# Patient Record
Sex: MALE | Race: WHITE | Employment: OTHER
[De-identification: names, ages, dates, MRNs, and addresses within clinical notes are randomized per-mention and may not be internally consistent; named-entity substitution may affect disease eponyms.]

---

## 2017-01-06 ENCOUNTER — OFFICE VISIT (OUTPATIENT)
Dept: FAMILY MEDICINE CLINIC | Facility: CLINIC | Age: 32
End: 2017-01-06

## 2017-01-06 VITALS
SYSTOLIC BLOOD PRESSURE: 112 MMHG | BODY MASS INDEX: 26.82 KG/M2 | HEIGHT: 74 IN | HEART RATE: 74 BPM | WEIGHT: 209 LBS | DIASTOLIC BLOOD PRESSURE: 78 MMHG | RESPIRATION RATE: 20 BRPM | TEMPERATURE: 97 F

## 2017-01-06 DIAGNOSIS — M79.10 MYALGIA: ICD-10-CM

## 2017-01-06 DIAGNOSIS — R68.89 FLU-LIKE SYMPTOMS: Primary | ICD-10-CM

## 2017-01-06 PROCEDURE — 99214 OFFICE O/P EST MOD 30 MIN: CPT | Performed by: NURSE PRACTITIONER

## 2017-01-07 ASSESSMENT — ENCOUNTER SYMPTOMS
BACK PAIN: 1
COUGH: 0
NAUSEA: 0
ABDOMINAL PAIN: 0
SHORTNESS OF BREATH: 0

## 2017-01-23 RX ORDER — TOPIRAMATE 25 MG/1
25 TABLET ORAL 2 TIMES DAILY
Qty: 60 TABLET | Refills: 3 | Status: SHIPPED | OUTPATIENT
Start: 2017-01-23 | End: 2017-06-08 | Stop reason: SDUPTHER

## 2017-04-26 ENCOUNTER — TELEPHONE (OUTPATIENT)
Dept: NEUROLOGY | Age: 32
End: 2017-04-26

## 2017-04-26 DIAGNOSIS — G93.9 HEADACHE DUE TO INTRACRANIAL DISEASE: ICD-10-CM

## 2017-04-26 DIAGNOSIS — R51.9 HEADACHE DUE TO INTRACRANIAL DISEASE: ICD-10-CM

## 2017-04-26 DIAGNOSIS — G91.9 HYDROCEPHALUS WITH OPERATING SHUNT (HCC): Primary | ICD-10-CM

## 2017-04-27 RX ORDER — HYDROCODONE BITARTRATE AND ACETAMINOPHEN 7.5; 325 MG/1; MG/1
1 TABLET ORAL EVERY 6 HOURS PRN
Qty: 30 TABLET | Refills: 0 | Status: SHIPPED | OUTPATIENT
Start: 2017-04-27 | End: 2017-05-04

## 2017-05-12 ENCOUNTER — HOSPITAL ENCOUNTER (OUTPATIENT)
Dept: MRI IMAGING | Age: 32
Discharge: HOME | End: 2017-05-12
Payer: MEDICARE

## 2017-05-12 DIAGNOSIS — G93.9 HEADACHE DUE TO INTRACRANIAL DISEASE: ICD-10-CM

## 2017-05-12 DIAGNOSIS — G91.9 HYDROCEPHALUS WITH OPERATING SHUNT (HCC): ICD-10-CM

## 2017-05-12 DIAGNOSIS — R51.9 HEADACHE DUE TO INTRACRANIAL DISEASE: ICD-10-CM

## 2017-05-12 PROCEDURE — 70551 MRI BRAIN STEM W/O DYE: CPT

## 2017-05-17 ENCOUNTER — TELEPHONE (OUTPATIENT)
Dept: NEUROLOGY | Age: 32
End: 2017-05-17

## 2017-05-18 RX ORDER — SUMATRIPTAN 100 MG/1
TABLET, FILM COATED ORAL
Qty: 9 TABLET | Refills: 1 | Status: SHIPPED | OUTPATIENT
Start: 2017-05-18 | End: 2017-07-31

## 2017-06-12 RX ORDER — TOPIRAMATE 25 MG/1
25 TABLET ORAL 2 TIMES DAILY
Qty: 60 TABLET | Refills: 2 | Status: SHIPPED | OUTPATIENT
Start: 2017-06-12 | End: 2017-08-28 | Stop reason: SDUPTHER

## 2017-07-31 ENCOUNTER — OFFICE VISIT (OUTPATIENT)
Dept: FAMILY MEDICINE CLINIC | Age: 32
End: 2017-07-31
Payer: MEDICARE

## 2017-07-31 VITALS
HEART RATE: 68 BPM | BODY MASS INDEX: 26.44 KG/M2 | OXYGEN SATURATION: 97 % | SYSTOLIC BLOOD PRESSURE: 113 MMHG | WEIGHT: 206 LBS | DIASTOLIC BLOOD PRESSURE: 76 MMHG | TEMPERATURE: 98.4 F | HEIGHT: 74 IN

## 2017-07-31 DIAGNOSIS — B07.0 PLANTAR WART OF LEFT FOOT: Primary | ICD-10-CM

## 2017-07-31 DIAGNOSIS — R73.9 HYPERGLYCEMIA: ICD-10-CM

## 2017-07-31 LAB — HBA1C MFR BLD: 5.1 %

## 2017-07-31 PROCEDURE — 99213 OFFICE O/P EST LOW 20 MIN: CPT | Performed by: FAMILY MEDICINE

## 2017-07-31 PROCEDURE — 83036 HEMOGLOBIN GLYCOSYLATED A1C: CPT | Performed by: FAMILY MEDICINE

## 2017-08-28 ENCOUNTER — OFFICE VISIT (OUTPATIENT)
Dept: NEUROLOGY | Age: 32
End: 2017-08-28
Payer: MEDICARE

## 2017-08-28 VITALS
WEIGHT: 208 LBS | DIASTOLIC BLOOD PRESSURE: 78 MMHG | BODY MASS INDEX: 26.69 KG/M2 | HEART RATE: 73 BPM | HEIGHT: 74 IN | SYSTOLIC BLOOD PRESSURE: 118 MMHG

## 2017-08-28 DIAGNOSIS — G91.9 HYDROCEPHALUS WITH OPERATING SHUNT (HCC): ICD-10-CM

## 2017-08-28 DIAGNOSIS — G93.9 HEADACHE DUE TO INTRACRANIAL DISEASE: Primary | ICD-10-CM

## 2017-08-28 DIAGNOSIS — R51.9 HEADACHE DUE TO INTRACRANIAL DISEASE: Primary | ICD-10-CM

## 2017-08-28 PROCEDURE — 99214 OFFICE O/P EST MOD 30 MIN: CPT | Performed by: PSYCHIATRY & NEUROLOGY

## 2017-08-28 RX ORDER — TOPIRAMATE 50 MG/1
50 TABLET, FILM COATED ORAL 2 TIMES DAILY
Qty: 180 TABLET | Refills: 3 | Status: SHIPPED | OUTPATIENT
Start: 2017-08-28 | End: 2018-07-09 | Stop reason: DRUGHIGH

## 2017-08-28 RX ORDER — TOPIRAMATE 25 MG/1
25 TABLET ORAL 2 TIMES DAILY
Qty: 180 TABLET | Refills: 3 | Status: SHIPPED | OUTPATIENT
Start: 2017-08-28 | End: 2018-07-09 | Stop reason: DRUGHIGH

## 2017-09-11 ENCOUNTER — HOSPITAL ENCOUNTER (OUTPATIENT)
Age: 32
Discharge: HOME | End: 2017-09-11
Payer: MEDICARE

## 2017-09-11 DIAGNOSIS — G93.9 HEADACHE DUE TO INTRACRANIAL DISEASE: ICD-10-CM

## 2017-09-11 DIAGNOSIS — R51.9 HEADACHE DUE TO INTRACRANIAL DISEASE: ICD-10-CM

## 2017-09-11 LAB
ALT SERPL-CCNC: 15 U/L (ref 5–41)
ANION GAP SERPL CALCULATED.3IONS-SCNC: 13 MMOL/L (ref 9–17)
BUN BLDV-MCNC: 16 MG/DL (ref 6–20)
CHLORIDE BLD-SCNC: 105 MMOL/L (ref 98–107)
CO2: 24 MMOL/L (ref 20–31)
CREAT SERPL-MCNC: 0.86 MG/DL (ref 0.7–1.2)
GFR AFRICAN AMERICAN: >60 ML/MIN
GFR NON-AFRICAN AMERICAN: >60 ML/MIN
GFR SERPL CREATININE-BSD FRML MDRD: NORMAL ML/MIN/{1.73_M2}
GFR SERPL CREATININE-BSD FRML MDRD: NORMAL ML/MIN/{1.73_M2}
HCT VFR BLD CALC: 42 % (ref 41–53)
HEMOGLOBIN: 14.1 G/DL (ref 13.5–17.5)
MCH RBC QN AUTO: 31.7 PG (ref 26–34)
MCHC RBC AUTO-ENTMCNC: 33.7 G/DL (ref 31–37)
MCV RBC AUTO: 94.1 FL (ref 80–100)
PDW BLD-RTO: 13 % (ref 11.5–14.9)
PLATELET # BLD: 207 K/UL (ref 150–450)
PMV BLD AUTO: 9.1 FL (ref 6–12)
POTASSIUM SERPL-SCNC: 3.9 MMOL/L (ref 3.7–5.3)
RBC # BLD: 4.46 M/UL (ref 4.5–5.9)
SODIUM BLD-SCNC: 142 MMOL/L (ref 135–144)
WBC # BLD: 7.1 K/UL (ref 3.5–11)

## 2017-09-11 PROCEDURE — 80051 ELECTROLYTE PANEL: CPT

## 2017-09-11 PROCEDURE — 84460 ALANINE AMINO (ALT) (SGPT): CPT

## 2017-09-11 PROCEDURE — 36415 COLL VENOUS BLD VENIPUNCTURE: CPT

## 2017-09-11 PROCEDURE — 85027 COMPLETE CBC AUTOMATED: CPT

## 2017-09-11 PROCEDURE — 84520 ASSAY OF UREA NITROGEN: CPT

## 2017-09-11 PROCEDURE — 82565 ASSAY OF CREATININE: CPT

## 2017-10-30 ENCOUNTER — HOSPITAL ENCOUNTER (OUTPATIENT)
Age: 32
Discharge: HOME | End: 2017-10-30
Payer: MEDICARE

## 2017-10-30 ENCOUNTER — OFFICE VISIT (OUTPATIENT)
Dept: FAMILY MEDICINE CLINIC | Age: 32
End: 2017-10-30
Payer: MEDICARE

## 2017-10-30 VITALS
WEIGHT: 211 LBS | SYSTOLIC BLOOD PRESSURE: 110 MMHG | HEIGHT: 74 IN | DIASTOLIC BLOOD PRESSURE: 70 MMHG | OXYGEN SATURATION: 98 % | BODY MASS INDEX: 27.08 KG/M2 | HEART RATE: 90 BPM

## 2017-10-30 DIAGNOSIS — D64.9 ANEMIA, UNSPECIFIED TYPE: ICD-10-CM

## 2017-10-30 DIAGNOSIS — R74.8 BLOOD ALKALINE PHOSPHATASE INCREASED COMPARED WITH PRIOR MEASUREMENT: ICD-10-CM

## 2017-10-30 DIAGNOSIS — G91.9 HYDROCEPHALUS WITH OPERATING SHUNT (HCC): ICD-10-CM

## 2017-10-30 DIAGNOSIS — R53.82 CHRONIC FATIGUE: ICD-10-CM

## 2017-10-30 DIAGNOSIS — R09.81 NASAL CONGESTION: ICD-10-CM

## 2017-10-30 DIAGNOSIS — Z23 NEED FOR IMMUNIZATION AGAINST INFLUENZA: ICD-10-CM

## 2017-10-30 DIAGNOSIS — M20.40 HAMMER TOE, ACQUIRED: ICD-10-CM

## 2017-10-30 DIAGNOSIS — R63.5 UNINTENDED WEIGHT GAIN: ICD-10-CM

## 2017-10-30 DIAGNOSIS — B07.0 PLANTAR WART OF LEFT FOOT: ICD-10-CM

## 2017-10-30 DIAGNOSIS — R53.82 CHRONIC FATIGUE: Primary | ICD-10-CM

## 2017-10-30 DIAGNOSIS — R04.0 EPISTAXIS, RECURRENT: ICD-10-CM

## 2017-10-30 LAB
ALBUMIN SERPL-MCNC: 4.8 G/DL (ref 3.5–5.2)
ALBUMIN/GLOBULIN RATIO: ABNORMAL (ref 1–2.5)
ALP BLD-CCNC: 104 U/L (ref 40–129)
ALT SERPL-CCNC: 20 U/L (ref 5–41)
ANION GAP SERPL CALCULATED.3IONS-SCNC: 10 MMOL/L (ref 9–17)
AST SERPL-CCNC: 19 U/L
BILIRUB SERPL-MCNC: <0.15 MG/DL (ref 0.3–1.2)
BUN BLDV-MCNC: 13 MG/DL (ref 6–20)
BUN/CREAT BLD: ABNORMAL (ref 9–20)
CALCIUM SERPL-MCNC: 9.4 MG/DL (ref 8.6–10.4)
CHLORIDE BLD-SCNC: 108 MMOL/L (ref 98–107)
CO2: 24 MMOL/L (ref 20–31)
CREAT SERPL-MCNC: 0.86 MG/DL (ref 0.7–1.2)
FERRITIN: 111 UG/L (ref 30–400)
FOLATE: 15.7 NG/ML
GFR AFRICAN AMERICAN: >60 ML/MIN
GFR NON-AFRICAN AMERICAN: >60 ML/MIN
GFR SERPL CREATININE-BSD FRML MDRD: ABNORMAL ML/MIN/{1.73_M2}
GFR SERPL CREATININE-BSD FRML MDRD: ABNORMAL ML/MIN/{1.73_M2}
GLUCOSE BLD-MCNC: 110 MG/DL (ref 70–99)
HAV IGM SER IA-ACNC: NONREACTIVE
HCT VFR BLD CALC: 45.4 % (ref 41–53)
HEMOGLOBIN: 15.3 G/DL (ref 13.5–17.5)
HEPATITIS B CORE IGM ANTIBODY: NONREACTIVE
HEPATITIS B SURFACE ANTIGEN: NONREACTIVE
HEPATITIS C ANTIBODY: NONREACTIVE
IRON SATURATION: 26 % (ref 20–55)
IRON: 73 UG/DL (ref 59–158)
MCH RBC QN AUTO: 31.9 PG (ref 26–34)
MCHC RBC AUTO-ENTMCNC: 33.8 G/DL (ref 31–37)
MCV RBC AUTO: 94.3 FL (ref 80–100)
PDW BLD-RTO: 12.8 % (ref 11.5–14.9)
PLATELET # BLD: 186 K/UL (ref 150–450)
PMV BLD AUTO: 8.9 FL (ref 6–12)
POTASSIUM SERPL-SCNC: 3.9 MMOL/L (ref 3.7–5.3)
RBC # BLD: 4.82 M/UL (ref 4.5–5.9)
SODIUM BLD-SCNC: 142 MMOL/L (ref 135–144)
TOTAL IRON BINDING CAPACITY: 278 UG/DL (ref 250–450)
TOTAL PROTEIN: 6.9 G/DL (ref 6.4–8.3)
TSH SERPL DL<=0.05 MIU/L-ACNC: 1.26 MIU/L (ref 0.3–5)
UNSATURATED IRON BINDING CAPACITY: 205 UG/DL (ref 112–347)
VITAMIN B-12: 404 PG/ML (ref 211–946)
WBC # BLD: 7 K/UL (ref 3.5–11)

## 2017-10-30 PROCEDURE — 82746 ASSAY OF FOLIC ACID SERUM: CPT

## 2017-10-30 PROCEDURE — 83540 ASSAY OF IRON: CPT

## 2017-10-30 PROCEDURE — 84080 ASSAY ALKALINE PHOSPHATASES: CPT

## 2017-10-30 PROCEDURE — 82728 ASSAY OF FERRITIN: CPT

## 2017-10-30 PROCEDURE — 82607 VITAMIN B-12: CPT

## 2017-10-30 PROCEDURE — 36415 COLL VENOUS BLD VENIPUNCTURE: CPT

## 2017-10-30 PROCEDURE — 84443 ASSAY THYROID STIM HORMONE: CPT

## 2017-10-30 PROCEDURE — G8484 FLU IMMUNIZE NO ADMIN: HCPCS | Performed by: FAMILY MEDICINE

## 2017-10-30 PROCEDURE — 90688 IIV4 VACCINE SPLT 0.5 ML IM: CPT | Performed by: FAMILY MEDICINE

## 2017-10-30 PROCEDURE — 1036F TOBACCO NON-USER: CPT | Performed by: FAMILY MEDICINE

## 2017-10-30 PROCEDURE — G8427 DOCREV CUR MEDS BY ELIG CLIN: HCPCS | Performed by: FAMILY MEDICINE

## 2017-10-30 PROCEDURE — 90471 IMMUNIZATION ADMIN: CPT | Performed by: FAMILY MEDICINE

## 2017-10-30 PROCEDURE — 99214 OFFICE O/P EST MOD 30 MIN: CPT | Performed by: FAMILY MEDICINE

## 2017-10-30 PROCEDURE — 80074 ACUTE HEPATITIS PANEL: CPT

## 2017-10-30 PROCEDURE — 84075 ASSAY ALKALINE PHOSPHATASE: CPT

## 2017-10-30 PROCEDURE — 80053 COMPREHEN METABOLIC PANEL: CPT

## 2017-10-30 PROCEDURE — 83550 IRON BINDING TEST: CPT

## 2017-10-30 PROCEDURE — 85027 COMPLETE CBC AUTOMATED: CPT

## 2017-10-30 PROCEDURE — G8417 CALC BMI ABV UP PARAM F/U: HCPCS | Performed by: FAMILY MEDICINE

## 2017-10-30 RX ORDER — FLUTICASONE PROPIONATE 50 MCG
2 SPRAY, SUSPENSION (ML) NASAL DAILY
Qty: 1 BOTTLE | Refills: 3 | Status: SHIPPED | OUTPATIENT
Start: 2017-10-30 | End: 2017-11-02 | Stop reason: SDUPTHER

## 2017-10-30 ASSESSMENT — PATIENT HEALTH QUESTIONNAIRE - PHQ9
SUM OF ALL RESPONSES TO PHQ QUESTIONS 1-9: 0
2. FEELING DOWN, DEPRESSED OR HOPELESS: 0
SUM OF ALL RESPONSES TO PHQ9 QUESTIONS 1 & 2: 0
1. LITTLE INTEREST OR PLEASURE IN DOING THINGS: 0

## 2017-10-30 ASSESSMENT — ENCOUNTER SYMPTOMS
SHORTNESS OF BREATH: 0
CHEST TIGHTNESS: 0
NAUSEA: 0
CONSTIPATION: 0
DIARRHEA: 0
ABDOMINAL PAIN: 0
WHEEZING: 0
VOMITING: 0
ABDOMINAL DISTENTION: 0
SINUS PRESSURE: 0
RHINORRHEA: 0
COUGH: 0

## 2017-10-30 NOTE — PROGRESS NOTES
Patient is here today for a follow up on his left foot wart and also just a regular follow up here in the office. Visit Information    Have you changed or started any medications since your last visit including any over-the-counter medicines, vitamins, or herbal medicines? no   Have you stopped taking any of your medications? Is so, why? -  no  Are you having any side effects from any of your medications? - no    Have you seen any other physician or provider since your last visit? yes - ent    Have you had any other diagnostic tests since your last visit?  no   Have you been seen in the emergency room and/or had an admission in a hospital since we last saw you?  no   Have you had your routine dental cleaning in the past 6 months?  no     Do you have an active MyChart account? If no, what is the barrier?   Yes    Patient Care Team:  Susy Moreno MD as PCP - General (Family Medicine)  Dulce Mera, [address] [place] [address] (Nurse Practitioner)  Rony Odell MD as Consulting Physician (Neurology)  Hao Cotter MD as Consulting Physician (Family Medicine)  Eleuterio Arcos NP as Nurse Practitioner (Certified Nurse Practitioner)  Jarrod Yañez (Certified Nurse Practitioner)    Medical History Review  Past Medical, Family, and Social History reviewed and does contribute to the patient presenting condition    Health Maintenance   Topic Date Due    HIV screen  03/18/1999    DTaP/Tdap/Td vaccine (1 - Tdap) 12/11/2015    Flu vaccine (1) 09/01/2017

## 2017-10-30 NOTE — PATIENT INSTRUCTIONS
Keep headache diary  Call neurology for appointment    Patient Education        Warts: Care Instructions  Your Care Instructions  A wart is a harmless skin growth caused by a virus. The virus makes the top layer of skin grow quickly, causing a wart. Warts usually go away on their own in months or years. There are several types of warts. Common warts appear most often on the hands, but they may be anywhere on the body. Plantar warts occur on the soles of the feet and may cause pain when you walk. Warts spread easily. You can reinfect yourself by touching the wart and then touching another part of your body. You can infect others by sharing towels, razors, or other personal items. Most warts do not need treatment and go away on their own. But if warts cause pain or spread, your doctor may recommend that you use an over-the-counter treatment. These include salicylic acid or duct tape. Or your doctor may prescribe a stronger medicine to put on warts or may inject them with medicine. The doctor also can remove warts through surgery or by freezing them. Follow-up care is a key part of your treatment and safety. Be sure to make and go to all appointments, and call your doctor if you are having problems. It's also a good idea to know your test results and keep a list of the medicines you take. How can you care for yourself at home? For common warts  · Use salicylic acid or duct tape as your doctor directs. You put the medicine or the tape on a wart for several days and then file down the dead skin on the wart. You use the salicylic acid treatment for 2 to 3 months or the tape for 1 to 2 months. · If your doctor prescribes medicine to put on warts, use it exactly as directed. Call your doctor if you think you are having a problem with your medicine. For plantar (foot) warts  · Wear comfortable shoes and socks. Avoid high heels and shoes that put a lot of pressure on your foot.   · Pad the wart with doughnut-shaped felt and they will get better on their own. Home treatment may help you feel better faster. The doctor has checked you carefully, but problems can develop later. If you notice any problems or new symptoms, get medical treatment right away. Follow-up care is a key part of your treatment and safety. Be sure to make and go to all appointments, and call your doctor if you are having problems. It's also a good idea to know your test results and keep a list of the medicines you take. How can you care for yourself at home? · Do not drive if you have taken a prescription pain medicine. · Rest in a quiet, dark room until your headache is gone. Close your eyes and try to relax or go to sleep. Don't watch TV or read. · Put a cold, moist cloth or cold pack on the painful area for 10 to 20 minutes at a time. Put a thin cloth between the cold pack and your skin. · Use a warm, moist towel or a heating pad set on low to relax tight shoulder and neck muscles. · Have someone gently massage your neck and shoulders. · Take pain medicines exactly as directed. ¨ If the doctor gave you a prescription medicine for pain, take it as prescribed. ¨ If you are not taking a prescription pain medicine, ask your doctor if you can take an over-the-counter medicine. · Be careful not to take pain medicine more often than the instructions allow, because you may get worse or more frequent headaches when the medicine wears off. · Do not ignore new symptoms that occur with a headache, such as a fever, weakness or numbness, vision changes, or confusion. These may be signs of a more serious problem. To prevent headaches  · Keep a headache diary so you can figure out what triggers your headaches. Avoiding triggers may help you prevent headaches. Record when each headache began, how long it lasted, and what the pain was like (throbbing, aching, stabbing, or dull).  Write down any other symptoms you had with the headache, such as nausea, flashing lights or dark spots, or sensitivity to bright light or loud noise. Note if the headache occurred near your period. List anything that might have triggered the headache, such as certain foods (chocolate, cheese, wine) or odors, smoke, bright light, stress, or lack of sleep. · Find healthy ways to deal with stress. Headaches are most common during or right after stressful times. Take time to relax before and after you do something that has caused a headache in the past.  · Try to keep your muscles relaxed by keeping good posture. Check your jaw, face, neck, and shoulder muscles for tension, and try relaxing them. When sitting at a desk, change positions often, and stretch for 30 seconds each hour. · Get plenty of sleep and exercise. · Eat regularly and well. Long periods without food can trigger a headache. · Treat yourself to a massage. Some people find that regular massages are very helpful in relieving tension. · Limit caffeine by not drinking too much coffee, tea, or soda. But don't quit caffeine suddenly, because that can also give you headaches. · Reduce eyestrain from computers by blinking frequently and looking away from the computer screen every so often. Make sure you have proper eyewear and that your monitor is set up properly, about an arm's length away. · Seek help if you have depression or anxiety. Your headaches may be linked to these conditions. Treatment can both prevent headaches and help with symptoms of anxiety or depression. When should you call for help? Call 911 anytime you think you may need emergency care. For example, call if:  · You have signs of a stroke. These may include:  ¨ Sudden numbness, paralysis, or weakness in your face, arm, or leg, especially on only one side of your body. ¨ Sudden vision changes. ¨ Sudden trouble speaking. ¨ Sudden confusion or trouble understanding simple statements. ¨ Sudden problems with walking or balance.   ¨ A sudden, severe headache that is

## 2017-10-30 NOTE — PROGRESS NOTES
Final    ALT 09/11/2017 15  5 - 41 U/L Final    Comment: Performed at [facility]: ANKUR MACDONALD [address]   [phone]         No results found for: HAV, HEPAIGM, HEPBIGM, HEPBCAB, HBEAG, HEPCAB      No results found for: TSHFT4, TSH    No results found for: CHOL  No results found for: TRIG  No results found for: HDL  No results found for: LDLCALC, LDLCHOLESTEROL  No results found for: LABVLDL, VLDL  No results found for: CHOLHDLRATIO    Lab Results   Component Value Date    LABA1C 5.1 07/31/2017       No results found for: NWDLORER81    No results found for: FOLATE    No results found for: IRON, TIBC, FERRITIN    No results found for: VITD25          Current Outpatient Prescriptions   Medication Sig Dispense Refill    topiramate (TOPAMAX) 50 MG tablet Take 1 tablet by mouth 2 times daily (Patient taking differently: Take 75 mg by mouth 2 times daily ) 180 tablet 3    topiramate (TOPAMAX) 25 MG tablet Take 1 tablet by mouth 2 times daily 761 tablet 3    Salicylic Acid 40 % PADS Apply on the left foot wart for 8-12 hrs prn 12 each 1    ketoconazole (NIZORAL) 2 % cream Apply topically daily Apply topically daily.  ketoconazole (NIZORAL) 2 % shampoo Apply topically daily as needed for Itching Apply topically daily as needed.  acetaminophen (APAP EXTRA STRENGTH) 500 MG tablet Take 1 tablet by mouth every 6 hours as needed for Pain or Fever 120 tablet 0     No current facility-administered medications for this visit. Past Medical History:   Diagnosis Date    Acute bronchitis due to infection 12/5/2016    Back pain     Diplopia     Eczema     Headache(784.0)     Hearing loss     Hydrocephalus with operating shunt         Social History     Social History    Marital status:      Spouse name: N/A    Number of children: N/A    Years of education: N/A     Occupational History    Not on file.      Social History Main Topics    Smoking status: Former Smoker     Packs/day: 0.50     Years: 20.00     Types: Cigarettes     Quit date: 9/3/2015    Smokeless tobacco: Never Used    Alcohol use No    Drug use: No    Sexual activity: Yes     Partners: Female     Other Topics Concern    Not on file     Social History Narrative    No narrative on file     Counseling given: Yes        Family History   Problem Relation Age of Onset    Migraines Mother     Heart Disease Father     Heart Disease Maternal Grandmother     Diabetes Maternal Grandmother     Hypertension Maternal Grandmother     Migraines Maternal Grandmother     Heart Disease Maternal Grandfather     Diabetes Maternal Grandfather     Hypertension Maternal Grandfather     Migraines Maternal Grandfather     Stroke Maternal Grandfather              -rest of complaints with corresponding details per ROS    The patient's past medical, surgical, social, and family history as well as his current medications and allergies were reviewed as documented in today's encounter. Review of Systems   Constitutional: Positive for fatigue and unexpected weight change. Negative for activity change, appetite change, chills, diaphoresis and fever. HENT: Positive for congestion, hearing loss and nosebleeds. Negative for rhinorrhea and sinus pressure. Respiratory: Negative for cough, chest tightness, shortness of breath and wheezing. Cardiovascular: Negative for chest pain, palpitations and leg swelling. Gastrointestinal: Negative for abdominal distention, abdominal pain, constipation, diarrhea, nausea and vomiting. Musculoskeletal: Positive for arthralgias (feet). Neurological: Positive for speech difficulty and headaches (on the vertex). Psychiatric/Behavioral: Negative for dysphoric mood. The patient is not nervous/anxious. Physical Exam   Constitutional: He is oriented to person, place, and time. He appears well-developed and well-nourished. No distress.    HENT:   Head: Normocephalic per day, or wear a pedometer and get at least 10,000 steps per day. - CBC; Future  - Comprehensive Metabolic Panel; Future  - TSH without Reflex; Future    4. Hammer toe, acquired b/l    - [facility]ckinley DPM, [address]*    5. Need for immunization against influenza    - INFLUENZA, QUADV, 3 YRS AND OLDER, IM, MDV, 0.5ML (FLUZONE QUADV)    6. Nasal congestion    -start  fluticasone (FLONASE) 50 MCG/ACT nasal spray; 2 sprays by Nasal route daily  Dispense: 1 Bottle; Refill: 3  We might consider referral to allergy specialist    7. Anemia, unspecified type    - Path Review, Smear; Future  - Iron and TIBC; Future  - Ferritin; Future  - Vitamin B12 & Folate; Future  If persistent anemia, will need GI workup     8. Blood alkaline phosphatase increased compared with prior measurement    - Alkaline Phosphatase, Isoenzymes; Future  - US Liver; Future  - Hepatitis Panel, Acute; Future    9. Epistaxis, recurrent  Start Flonase for now    10.  Hydrocephalus with operating shunt  Saw ENT before and was told \"everything is fine\"  Follow up with new neurology for headaches  We can consider referral to allergy testing for possible component of his chronic almost daily headaches  Continue Topamax for now  Headache diary advised  Doesn't drive, he has transportation through his insurance    Orders Placed This Encounter   Procedures    US Liver     Standing Status:   Future     Standing Expiration Date:   10/30/2018     Order Specific Question:   Reason for exam:     Answer:   elevated LFTs    INFLUENZA, QUADV, 3 YRS AND OLDER, IM, MDV, 0.5ML (FLUZONE QUADV)    CBC     Standing Status:   Future     Standing Expiration Date:   10/30/2018    Comprehensive Metabolic Panel     Standing Status:   Future     Standing Expiration Date:   10/30/2018    TSH without Reflex     Standing Status:   Future     Standing Expiration Date:   10/31/2018    Path Review, Smear     Standing Status:   Future     Standing Expiration Date:   10/30/2018    Iron and TIBC     Standing Status:   Future     Standing Expiration Date:   10/30/2018     Order Specific Question:   Is Patient Fasting? Answer:   yes     Order Specific Question:   No of Hours? Answer:   8    Ferritin     Standing Status:   Future     Standing Expiration Date:   10/30/2018    Vitamin B12 & Folate     Standing Status:   Future     Standing Expiration Date:   10/31/2018    Alkaline Phosphatase, Isoenzymes     Standing Status:   Future     Standing Expiration Date:   10/30/2018    Hepatitis Panel, Acute     Standing Status:   Future     Standing Expiration Date:   10/30/2018   Eva Rogers DPM, [facility] [place]*     Referral Priority:   Routine     Referral Type:   Consult for Advice and Opinion     Referral Reason:   Specialty Services Required     Referred to Provider:   Hector Jordan DPM     Requested Specialty:   Podiatry     Number of Visits Requested:   1         There are no discontinued medications. Kristine Moffett received counseling on the following healthy behaviors: nutrition, weight loss and exercise. Reviewed prior labs and health maintenance  Continue current medications, diet and exercise. Discussed use, benefit, and side effects of prescribed medications. Barriers to medication compliance addressed. Patient given educational materials - see patient instructions  Was a self-tracking handout given in paper form or via [employer]? No    Requested Prescriptions     Signed Prescriptions Disp Refills    fluticasone (FLONASE) 50 MCG/ACT nasal spray 1 Bottle 3     Si sprays by Nasal route daily       All patient questions answered. Patient voiced understanding. Quality Measures    Body mass index is 27.09 kg/m². Elevated. Weight control planned discussed conventional weight loss and Healthy diet and regular exercise. BP: 110/70 Blood pressure is normal. Treatment plan consists of No treatment change needed.     No results found for: LDLCALC, LDLCHOLESTEROL, LDLDIRECT (goal LDL reduction with dx if diabetes is 50% LDL reduction)        Negative depression screening. PHQ Scores 10/30/2017 12/5/2016 7/26/2013   PHQ2 Score 0 0 -   PHQ9 Score 0 0 3     Interpretation of Total Score Depression Severity: 1-4 = Minimal depression, 5-9 = Mild depression, 10-14 = Moderate depression, 15-19 = Moderately severe depression, 20-27 = Severe depression      The patient's past medical, surgical, social, and family history as well as his   current medications and allergies were reviewed as documented in today's encounter. Medications, labs, diagnostic studies, consultations and follow-up as documented in this encounter. Return in about 3 months (around 1/30/2018) for LABS F/U, FATIGUE. Patient was seen with total face to face time of  25 minutes. More than 50% of this visit was counseling and education. Future Appointments  Date Time Provider Carrington Ojeda   8/30/2018 10:00 AM Vadim Lim CNP [id number]     This note was completed by using the assistance of a speech-recognition program. However, inadvertent computerized transcription errors may be present. Although every effort was made to ensure accuracy, no guarantees can be provided that every mistake has been identified and corrected by editing.   Electronically signed by Monica Rivera MD on 10/30/2017  9:24 PM

## 2017-10-31 LAB — PATHOLOGIST REVIEW: NORMAL

## 2017-11-01 LAB
ALK PHOS BONE SPECIFIC: 25 U/L (ref 0–55)
ALK PHOS OTHER CALC: 0 U/L
ALK PHOSPHATASE: 106 U/L (ref 40–120)
ALKALINE PHOSPHATASE LIVER FRACTION: 81 U/L (ref 0–94)

## 2017-11-01 NOTE — PROGRESS NOTES
PLEASE NOTIFY PATIENT.   Labs within normal limits   Normal alkaline phosphatase level   Resolved anemia  Start FLonase, could add Claritin and see if it helps with headaches

## 2017-11-02 DIAGNOSIS — J30.89 CHRONIC ALLERGIC RHINITIS DUE TO OTHER ALLERGIC TRIGGER, UNSPECIFIED SEASONALITY: Primary | ICD-10-CM

## 2017-11-02 DIAGNOSIS — R09.81 NASAL CONGESTION: ICD-10-CM

## 2017-11-02 RX ORDER — FLUTICASONE PROPIONATE 50 MCG
2 SPRAY, SUSPENSION (ML) NASAL DAILY
Qty: 1 BOTTLE | Refills: 3 | Status: SHIPPED | OUTPATIENT
Start: 2017-11-02 | End: 2018-08-30 | Stop reason: ALTCHOICE

## 2017-11-02 RX ORDER — LORATADINE 10 MG/1
10 TABLET ORAL DAILY
Qty: 30 TABLET | Refills: 3 | Status: SHIPPED | OUTPATIENT
Start: 2017-11-02 | End: 2018-08-30 | Stop reason: ALTCHOICE

## 2017-11-02 NOTE — PROGRESS NOTES
Pt was made aware of results. Pt has no other questions at this time. Pt states he would liek to try claritin with flonase , send to [facility] [address]. Thank you.

## 2017-11-08 ENCOUNTER — PATIENT MESSAGE (OUTPATIENT)
Dept: FAMILY MEDICINE CLINIC | Age: 32
End: 2017-11-08

## 2017-11-08 NOTE — TELEPHONE ENCOUNTER
From: Adarsh Coreas  To: Rocío Mcfarland MD  Sent: 11/8/2017 9:25 AM EST  Subject: Test Results Question    I have a question about COMPREHENSIVE METABOLIC PANEL resulted on 10/30/17, 5:58 PM.    I was just wondering if someone could tell me what the high glucose and chloride levels mean?

## 2017-12-20 ENCOUNTER — OFFICE VISIT (OUTPATIENT)
Dept: PODIATRY | Age: 32
End: 2017-12-20
Payer: MEDICARE

## 2017-12-20 VITALS
SYSTOLIC BLOOD PRESSURE: 113 MMHG | HEART RATE: 71 BPM | WEIGHT: 207 LBS | HEIGHT: 74 IN | BODY MASS INDEX: 26.56 KG/M2 | DIASTOLIC BLOOD PRESSURE: 77 MMHG

## 2017-12-20 DIAGNOSIS — L98.9 BENIGN SKIN LESION: Primary | ICD-10-CM

## 2017-12-20 DIAGNOSIS — M20.41 HAMMER TOES OF BOTH FEET: ICD-10-CM

## 2017-12-20 DIAGNOSIS — M79.672 PAIN IN LEFT FOOT: ICD-10-CM

## 2017-12-20 DIAGNOSIS — M20.42 HAMMER TOES OF BOTH FEET: ICD-10-CM

## 2017-12-20 PROCEDURE — G8484 FLU IMMUNIZE NO ADMIN: HCPCS | Performed by: PODIATRIST

## 2017-12-20 PROCEDURE — 1036F TOBACCO NON-USER: CPT | Performed by: PODIATRIST

## 2017-12-20 PROCEDURE — G8427 DOCREV CUR MEDS BY ELIG CLIN: HCPCS | Performed by: PODIATRIST

## 2017-12-20 PROCEDURE — 99203 OFFICE O/P NEW LOW 30 MIN: CPT | Performed by: PODIATRIST

## 2017-12-20 PROCEDURE — G8417 CALC BMI ABV UP PARAM F/U: HCPCS | Performed by: PODIATRIST

## 2017-12-20 ASSESSMENT — ENCOUNTER SYMPTOMS
DIARRHEA: 0
NAUSEA: 0
BACK PAIN: 0
COLOR CHANGE: 0
SHORTNESS OF BREATH: 0

## 2017-12-20 NOTE — PROGRESS NOTES
Yun Estrada is a 34 y.o. male who presents to the office today with chief complaint of wart to the left foot. Chief Complaint   Patient presents with    Foot Pain     wart on left foot/ b/lhedwardo    Symptoms began about 2 month(s) ago. Patient denies injury to the left foot. Patient states that there is pain with pressure. Pain is rated 6 out of 10 at it's worst and is described as intermittent. Treatments prior to today's visit include: Patient has tried prescription wart remover pads, but this did not work. No Known Allergies    Past Medical History:   Diagnosis Date    Acute bronchitis due to infection 12/5/2016    Back pain     Diplopia     Eczema     Headache(784.0)     Hearing loss     Hydrocephalus with operating shunt        Prior to Admission medications    Medication Sig Start Date End Date Taking? Authorizing Provider   fluticasone (FLONASE) 50 MCG/ACT nasal spray 2 sprays by Nasal route daily 11/2/17 11/2/18 Yes Donna Miner MD   loratadine (CLARITIN) 10 MG tablet Take 1 tablet by mouth daily 11/2/17  Yes Donna Miner MD   topiramate (TOPAMAX) 25 MG tablet Take 1 tablet by mouth 2 times daily 8/28/17 8/28/18 Yes Josiane Kyle MD   topiramate (TOPAMAX) 50 MG tablet Take 1 tablet by mouth 2 times daily  Patient taking differently: Take 75 mg by mouth 2 times daily  8/28/17 8/28/18 Yes Josiane Kyle MD   Salicylic Acid 40 % PADS Apply on the left foot wart for 8-12 hrs prn 7/31/17  Yes Donna Miner MD   ketoconazole (NIZORAL) 2 % cream Apply topically daily Apply topically daily. Yes Historical Provider, MD   ketoconazole (NIZORAL) 2 % shampoo Apply topically daily as needed for Itching Apply topically daily as needed.    Yes Historical Provider, MD   acetaminophen (APAP EXTRA STRENGTH) 500 MG tablet Take 1 tablet by mouth every 6 hours as needed for Pain or Fever 12/5/16  Yes Donna Miner MD       Past Surgical History:   Procedure Laterality Date    CHOLECYSTECTOMY, LAPAROSCOPIC  2001    MASTOIDECTOMY Right 2001    VENTRICULOPERITONEAL SHUNT      VENTRICULOPERITONEAL SHUNT  06/23/2014    REVISION       Family History   Problem Relation Age of Onset    Migraines Mother     Heart Disease Father     Heart Disease Maternal Grandmother     Diabetes Maternal Grandmother     Hypertension Maternal Grandmother     Migraines Maternal Grandmother     Heart Disease Maternal Grandfather     Diabetes Maternal Grandfather     Hypertension Maternal Grandfather     Migraines Maternal Grandfather     Stroke Maternal Grandfather        Social History   Substance Use Topics    Smoking status: Former Smoker     Packs/day: 0.50     Years: 20.00     Types: Cigarettes     Quit date: 9/3/2015    Smokeless tobacco: Never Used    Alcohol use No       Review of Systems   Constitutional: Negative for activity change, appetite change, chills, diaphoresis, fatigue and fever. Respiratory: Negative for shortness of breath. Cardiovascular: Negative for leg swelling. Gastrointestinal: Negative for diarrhea and nausea. Endocrine: Negative for cold intolerance, heat intolerance and polyuria. Musculoskeletal: Negative for arthralgias, back pain, gait problem, joint swelling and myalgias. Skin: Negative for color change, pallor, rash and wound. Allergic/Immunologic: Negative for environmental allergies and food allergies. Neurological: Negative for dizziness, weakness, light-headedness and numbness. Hematological: Does not bruise/bleed easily. Psychiatric/Behavioral: Negative for behavioral problems, confusion and self-injury. The patient is not nervous/anxious. Vitals:   Vitals:    12/20/17 1321   BP: 113/77   Pulse: 71       General: AAO x 3 in NAD. Integument: There are no rashes, ulcers, or breaks in the skin noted to the bilateral lower extremities.      There is no induration, subcutaneous nodules, or tightening of the skin noted to the

## 2018-01-18 ENCOUNTER — OFFICE VISIT (OUTPATIENT)
Dept: PODIATRY | Age: 33
End: 2018-01-18
Payer: MEDICARE

## 2018-01-18 VITALS
HEART RATE: 73 BPM | DIASTOLIC BLOOD PRESSURE: 80 MMHG | HEIGHT: 74 IN | WEIGHT: 200 LBS | SYSTOLIC BLOOD PRESSURE: 122 MMHG | BODY MASS INDEX: 25.67 KG/M2

## 2018-01-18 DIAGNOSIS — L98.9 BENIGN SKIN LESION: Primary | ICD-10-CM

## 2018-01-18 DIAGNOSIS — M79.672 PAIN IN LEFT FOOT: ICD-10-CM

## 2018-01-18 PROCEDURE — G8417 CALC BMI ABV UP PARAM F/U: HCPCS | Performed by: PODIATRIST

## 2018-01-18 PROCEDURE — G8428 CUR MEDS NOT DOCUMENT: HCPCS | Performed by: PODIATRIST

## 2018-01-18 PROCEDURE — G8484 FLU IMMUNIZE NO ADMIN: HCPCS | Performed by: PODIATRIST

## 2018-01-18 PROCEDURE — 1036F TOBACCO NON-USER: CPT | Performed by: PODIATRIST

## 2018-01-18 PROCEDURE — 99213 OFFICE O/P EST LOW 20 MIN: CPT | Performed by: PODIATRIST

## 2018-01-19 ASSESSMENT — ENCOUNTER SYMPTOMS
BACK PAIN: 0
COLOR CHANGE: 0
DIARRHEA: 0
SHORTNESS OF BREATH: 0
NAUSEA: 0

## 2018-01-19 NOTE — PROGRESS NOTES
trichloracetic acid and covered with Band-Aids. 3. Return in about 4 weeks (around 2/15/2018) for evaluation of porokeratosis left foot.    1/18/2018      Zhang Giordano DPM

## 2018-02-15 ENCOUNTER — OFFICE VISIT (OUTPATIENT)
Dept: PODIATRY | Age: 33
End: 2018-02-15
Payer: MEDICARE

## 2018-02-15 VITALS
HEART RATE: 78 BPM | SYSTOLIC BLOOD PRESSURE: 103 MMHG | HEIGHT: 74 IN | BODY MASS INDEX: 25.67 KG/M2 | WEIGHT: 200 LBS | DIASTOLIC BLOOD PRESSURE: 60 MMHG

## 2018-02-15 DIAGNOSIS — M79.672 PAIN IN LEFT FOOT: ICD-10-CM

## 2018-02-15 DIAGNOSIS — L98.9 BENIGN SKIN LESION: Primary | ICD-10-CM

## 2018-02-15 PROCEDURE — 11055 PARING/CUTG B9 HYPRKER LES 1: CPT | Performed by: PODIATRIST

## 2018-02-15 RX ORDER — TOBRAMYCIN AND DEXAMETHASONE 3; 1 MG/ML; MG/ML
SUSPENSION/ DROPS OPHTHALMIC
Refills: 0 | COMMUNITY
Start: 2018-02-13 | End: 2018-03-06 | Stop reason: ALTCHOICE

## 2018-02-15 RX ORDER — AMOXICILLIN AND CLAVULANATE POTASSIUM 875; 125 MG/1; MG/1
TABLET, FILM COATED ORAL
Refills: 0 | COMMUNITY
Start: 2018-02-13 | End: 2018-03-06 | Stop reason: ALTCHOICE

## 2018-02-16 ASSESSMENT — ENCOUNTER SYMPTOMS
NAUSEA: 0
DIARRHEA: 0
SHORTNESS OF BREATH: 0
COLOR CHANGE: 0
BACK PAIN: 0

## 2018-02-16 NOTE — PROGRESS NOTES
Subjective: Rhea Chavis 34 y.o. male that presents for follow up evaluation of painful callous to the bottom of the left foot. Chief Complaint   Patient presents with    Callouses     Porokeratosis left foot, doing good    Patient's treatment thus far has included serial debridement and treatment with TCA. Pain is rated 3 out of 10 and is described as intermittent. Patient has been following my prescribed course of therapy as instructed. Review of Systems   Constitutional: Negative for activity change, appetite change, chills, diaphoresis, fatigue and fever. Respiratory: Negative for shortness of breath. Cardiovascular: Negative for leg swelling. Gastrointestinal: Negative for diarrhea and nausea. Endocrine: Negative for cold intolerance, heat intolerance and polyuria. Musculoskeletal: Positive for arthralgias. Negative for back pain, gait problem, joint swelling and myalgias. Skin: Negative for color change, pallor, rash and wound. Allergic/Immunologic: Negative for environmental allergies and food allergies. Neurological: Negative for dizziness, weakness, light-headedness and numbness. Hematological: Does not bruise/bleed easily. Psychiatric/Behavioral: Negative for behavioral problems, confusion and self-injury. The patient is not nervous/anxious. Objective: Clinical evaluation of the patient reveals a continued preulcerative lesion to the left foot submetatarsal head four. There is pain with direct palpation of this lesion. There is no surrounding erythema or calor noted to the left foot. Debridement of the lesion with a fifteen blade reveals a central core. This was also debrided with a fifteen blade. There was no drainage or malodor noted to the area. There was no pinpoint bleeding noted to the area. Assessment:   1. Benign skin lesion  HI TRIM HYPERKERATOTIC SKIN LESION, ONE   2. Pain in left foot  HI TRIM HYPERKERATOTIC SKIN LESION, ONE         Plan: 1.  Clinical

## 2018-03-06 ENCOUNTER — OFFICE VISIT (OUTPATIENT)
Dept: PODIATRY | Age: 33
End: 2018-03-06
Payer: MEDICARE

## 2018-03-06 VITALS
WEIGHT: 200 LBS | BODY MASS INDEX: 25.67 KG/M2 | DIASTOLIC BLOOD PRESSURE: 69 MMHG | SYSTOLIC BLOOD PRESSURE: 108 MMHG | HEART RATE: 67 BPM | HEIGHT: 74 IN

## 2018-03-06 DIAGNOSIS — L98.9 BENIGN SKIN LESION: Primary | ICD-10-CM

## 2018-03-06 DIAGNOSIS — M79.672 PAIN IN LEFT FOOT: ICD-10-CM

## 2018-03-06 PROCEDURE — G8417 CALC BMI ABV UP PARAM F/U: HCPCS | Performed by: PODIATRIST

## 2018-03-06 PROCEDURE — G8427 DOCREV CUR MEDS BY ELIG CLIN: HCPCS | Performed by: PODIATRIST

## 2018-03-06 PROCEDURE — 1036F TOBACCO NON-USER: CPT | Performed by: PODIATRIST

## 2018-03-06 PROCEDURE — G8484 FLU IMMUNIZE NO ADMIN: HCPCS | Performed by: PODIATRIST

## 2018-03-06 PROCEDURE — 99213 OFFICE O/P EST LOW 20 MIN: CPT | Performed by: PODIATRIST

## 2018-03-07 ASSESSMENT — ENCOUNTER SYMPTOMS
DIARRHEA: 0
NAUSEA: 0
BACK PAIN: 0
SHORTNESS OF BREATH: 0
COLOR CHANGE: 0

## 2018-07-09 RX ORDER — TOPIRAMATE 100 MG/1
100 TABLET, FILM COATED ORAL 2 TIMES DAILY
Qty: 60 TABLET | Refills: 1 | Status: SHIPPED | OUTPATIENT
Start: 2018-07-09 | End: 2018-08-30 | Stop reason: SDUPTHER

## 2018-07-09 NOTE — TELEPHONE ENCOUNTER
Pt called in stating he has been having increased headaches. He was wondering if we can increase his Topamax, he is currently taking 75 mg BID, or order a new scan? He has an appt scheduled with you on 8/30. Please advise, thanks.

## 2018-08-30 ENCOUNTER — OFFICE VISIT (OUTPATIENT)
Dept: NEUROLOGY | Age: 33
End: 2018-08-30
Payer: MEDICARE

## 2018-08-30 VITALS
WEIGHT: 208.6 LBS | BODY MASS INDEX: 26.77 KG/M2 | HEART RATE: 67 BPM | SYSTOLIC BLOOD PRESSURE: 116 MMHG | DIASTOLIC BLOOD PRESSURE: 74 MMHG | HEIGHT: 74 IN

## 2018-08-30 DIAGNOSIS — G91.8 CHRONIC BRAIN-HYDROCEPHALUS SYNDROME (HCC): ICD-10-CM

## 2018-08-30 DIAGNOSIS — R51.9 HEADACHE DUE TO INTRACRANIAL DISEASE: Primary | ICD-10-CM

## 2018-08-30 DIAGNOSIS — G93.9 HEADACHE DUE TO INTRACRANIAL DISEASE: Primary | ICD-10-CM

## 2018-08-30 PROCEDURE — G8427 DOCREV CUR MEDS BY ELIG CLIN: HCPCS | Performed by: NURSE PRACTITIONER

## 2018-08-30 PROCEDURE — G8417 CALC BMI ABV UP PARAM F/U: HCPCS | Performed by: NURSE PRACTITIONER

## 2018-08-30 PROCEDURE — 1036F TOBACCO NON-USER: CPT | Performed by: NURSE PRACTITIONER

## 2018-08-30 PROCEDURE — 99214 OFFICE O/P EST MOD 30 MIN: CPT | Performed by: NURSE PRACTITIONER

## 2018-08-30 RX ORDER — TOPIRAMATE 100 MG/1
100 TABLET, FILM COATED ORAL 2 TIMES DAILY
Qty: 60 TABLET | Refills: 11 | Status: SHIPPED | OUTPATIENT
Start: 2018-08-30 | End: 2019-07-30 | Stop reason: SDUPTHER

## 2018-08-30 ASSESSMENT — ENCOUNTER SYMPTOMS: DIPLOPIA: HORIZONTAL

## 2018-08-30 NOTE — PROGRESS NOTES
[facility]            [address]          [facility], [address] [facility]          Dept: [phone]          Dept Fax: [phone]        MD Chelsea Lewis MD Ahmed B. Lolita Clap, MD Clarence Lamas, MD Lehman Sanfilippo, MD Orpah Comp, CNP            8/30/2018    HPI:      Your patient, Inocente Rubio returns for continuing neurologic care. Patient is a 44-year-old man who was seen last in August 2017 by Dr. Cydney Hernandez. 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 In 2001 he had his first ventriculoperitoneal shunt performed by Dr. Clovis Faria, with multiple subsequent revisions including 2003, 2007, 2013, and 2014. Patient has chronic headaches which have been progressive since January 2014. His last revision in 2014 was a proximal shunt revision. MRI of the brain in 2014 showed dilation of the ventricular system despite the presence of shunts along the transependymal migration of fluid. A subsequent ET scan of the brain showed an interval the compression of the ventricular system. A repeat MRI in May 2017 showed stable ventricular volumes and configuration with unchanged position of the bilateral parietal approach ventriculoperitoneal shunt catheters. It also showed a stable right posterior fossa resection cavity and a stable benign--appearing multicystic lesion along the posterior septum pellucidum and posterior third ventricle. Patient is here today for his annual reevaluation. He has been treated with Topamax for relief of his headaches and in July 2018, patient called stating that his headaches have become worse. His Topamax dosage was increased from 75 mg twice daily to 100 mg twice daily which has eased his headaches.   Patient has not worked at all during his life, but recently lost his Social Security absent   MUSCULOSKELETAL Neck pain: absent, Back pain: present, Stiffness: absent, Muscle pain: absent, Joint pain: absent Restless legs: absent   DERMATOLOGIC Hair loss: present, Skin changes: absent   NEUROLOGIC Memory loss: present, Confusion: absent, Seizures: absent Trouble walking or imbalance: absent, Dizziness: absent, Weakness: absent, Numbness: absent Tremor: absent, Spasm: absent, Speech difficulty: absent, Headache: present, Light sensitivity: absent   PSYCHIATRIC Anxiety: absent, Hallucination: absent, Mood disorder: absent   HEMATOLOGIC Abnormal bleeding: absent, Anemia: absent, Clotting disorder: absent, Lymph gland changes: absent           No Known Allergies        Current Outpatient Prescriptions   Medication Sig Dispense Refill    topiramate (TOPAMAX) 100 MG tablet Take 1 tablet by mouth 2 times daily 60 tablet 1    ketoconazole (NIZORAL) 2 % cream Apply topically daily Apply topically daily.  acetaminophen (APAP EXTRA STRENGTH) 500 MG tablet Take 1 tablet by mouth every 6 hours as needed for Pain or Fever 120 tablet 0     No current facility-administered medications for this visit. PHYSICAL EXAMINATION       There were no vitals taken for this visit.                                             .                                                                                                      General Appearance:  Alert, cooperative, no signs of distress, appears stated age   Head:  Normocephalic, no signs of trauma   Eyes:  Conjunctiva/corneas clear;  eyelids intact, bilateral horizontal nystagmus   Ears:  Normal external ear and canals   Nose: Nares normal, mucosa normal, no drainage    Throat: Lips and tongue normal; teeth normal;  gums normal   Neck: Supple, intact flexion, extension and rotation;   trachea midline;  no adenopathy;   thyroid: not enlarged;   no carotid pulse abnormality   Back:   Symmetric, no curvature, ROM adequate   Lungs: Respirations unlabored   Heart:  Regular rate and rhythm           Extremities: Extremities normal, no cyanosis, no edema   Pulses: Symmetric over head and neck   Skin: Skin color, texture normal, no rashes, no lesions                                             NEUROLOGIC EXAMINATION    Neurologic Exam     Mental Status   Oriented to person, place, and time. Attention: normal.   Speech: speech is normal   Level of consciousness: alert  Normal comprehension. Cranial Nerves     CN II   Visual fields full to confrontation. CN III, IV, VI   Pupils are equal, round, and reactive to light. Right pupil: Reactivity: brisk. Left pupil: Reactivity: brisk. Nystagmus: bilateral   Nystagmus type: horizontal  Diplopia: horizontal  Ophthalmoparesis: none    CN V   Facial sensation intact. CN VII   Facial expression full, symmetric. CN VIII   CN VIII normal.     CN IX, X   CN IX normal.     CN XI   CN XI normal.     CN XII   CN XII normal.     Motor Exam   Muscle bulk: normal  Overall muscle tone: normal  Right arm tone: normal  Left arm tone: normal  Right arm pronator drift: absent  Left arm pronator drift: absent  Right leg tone: normal  Left leg tone: normal    Strength   Strength 5/5 throughout. Sensory Exam   Light touch normal.   Vibration normal.   Pinprick normal.     Gait, Coordination, and Reflexes     Gait  Gait: normal    Coordination   Finger to nose coordination: normal    Tremor   Resting tremor: absent    Reflexes   Right brachioradialis: 2+  Left brachioradialis: 2+  Right biceps: 2+  Left biceps: 2+  Right triceps: 2+  Left triceps: 2+  Right patellar: 2+  Left patellar: 2+  Right achilles: 2+  Left achilles: 2+  Right plantar: normal  Left plantar: normal            ASSESSMENT/PLAN:       In summary, your patient, João Michaels exhibits the following, with associated plan:    1. Chronic headaches secondary to chronic migraine/hydrocephalus syndrome  1.  Continue Topamax 100 mg twice daily  2. Patient to start a new job on Monday, September 3. I advised that there are no significant weight restrictions, but because of his headaches and having no work experience, he may initially experience fatigue. 3. Patient will continue to follow up on a yearly basis. He was advised that if he develops any additional headaches and we will scan him at that time. It is been one year since his last MRI of the brain. He has experienced no new neurologic symptoms since his last visit.   2. Intermittent diplopia, chronic            Signed: Judy Bound, CNP      *Please note that portions of this note were completed with a voice recognition program.  Although every effort was made to insure the accuracy of this automated transcription, some errors in transcription may have occurred, occasionally words and are mis-transcribed

## 2018-10-19 ENCOUNTER — TELEPHONE (OUTPATIENT)
Dept: NEUROLOGY | Age: 33
End: 2018-10-19

## 2018-10-24 NOTE — TELEPHONE ENCOUNTER
A call was placed to the patient. It went to voice mail. A message was left letting him know that Doris Parnell said he could try to get a drivers license.

## 2019-07-16 ENCOUNTER — OFFICE VISIT (OUTPATIENT)
Dept: PRIMARY CARE CLINIC | Age: 34
End: 2019-07-16
Payer: MEDICARE

## 2019-07-16 VITALS
WEIGHT: 199.4 LBS | HEIGHT: 74 IN | SYSTOLIC BLOOD PRESSURE: 128 MMHG | OXYGEN SATURATION: 98 % | HEART RATE: 65 BPM | DIASTOLIC BLOOD PRESSURE: 70 MMHG | BODY MASS INDEX: 25.59 KG/M2

## 2019-07-16 DIAGNOSIS — G91.8 CHRONIC BRAIN-HYDROCEPHALUS SYNDROME (HCC): ICD-10-CM

## 2019-07-16 DIAGNOSIS — G91.9 HYDROCEPHALUS WITH OPERATING SHUNT (HCC): Primary | ICD-10-CM

## 2019-07-16 PROBLEM — R09.81 NASAL CONGESTION: Status: RESOLVED | Noted: 2017-10-30 | Resolved: 2019-07-16

## 2019-07-16 PROBLEM — R04.0 EPISTAXIS, RECURRENT: Status: RESOLVED | Noted: 2017-10-30 | Resolved: 2019-07-16

## 2019-07-16 PROBLEM — R73.9 HYPERGLYCEMIA: Status: RESOLVED | Noted: 2017-07-31 | Resolved: 2019-07-16

## 2019-07-16 PROBLEM — R63.5 UNINTENDED WEIGHT GAIN: Status: RESOLVED | Noted: 2017-10-30 | Resolved: 2019-07-16

## 2019-07-16 PROCEDURE — 99213 OFFICE O/P EST LOW 20 MIN: CPT | Performed by: FAMILY MEDICINE

## 2019-07-16 ASSESSMENT — ENCOUNTER SYMPTOMS
NAUSEA: 0
EYE REDNESS: 0
RHINORRHEA: 0
VOMITING: 0
SHORTNESS OF BREATH: 0
ABDOMINAL PAIN: 0
SORE THROAT: 0
DIARRHEA: 0
EYE DISCHARGE: 0
COUGH: 0
WHEEZING: 0

## 2019-07-16 ASSESSMENT — PATIENT HEALTH QUESTIONNAIRE - PHQ9
SUM OF ALL RESPONSES TO PHQ QUESTIONS 1-9: 0
1. LITTLE INTEREST OR PLEASURE IN DOING THINGS: 0
SUM OF ALL RESPONSES TO PHQ9 QUESTIONS 1 & 2: 0
2. FEELING DOWN, DEPRESSED OR HOPELESS: 0
SUM OF ALL RESPONSES TO PHQ QUESTIONS 1-9: 0

## 2019-07-18 ENCOUNTER — TELEPHONE (OUTPATIENT)
Dept: NEUROLOGY | Age: 34
End: 2019-07-18

## 2019-07-18 DIAGNOSIS — R51.9 HEADACHE DUE TO INTRACRANIAL DISEASE: ICD-10-CM

## 2019-07-18 DIAGNOSIS — G91.8 CHRONIC BRAIN-HYDROCEPHALUS SYNDROME (HCC): Primary | ICD-10-CM

## 2019-07-18 DIAGNOSIS — G93.9 HEADACHE DUE TO INTRACRANIAL DISEASE: ICD-10-CM

## 2019-07-18 NOTE — TELEPHONE ENCOUNTER
Telephone call from the patient's wife, the secondary insurance is [employer] and so they need the MRI order faxed to [id number]. Faxed the order to centralized scheduling at [phone].   [place]

## 2019-07-18 NOTE — TELEPHONE ENCOUNTER
Pt's wife called in stating pt has been having a lot of headaches. He has an appt coming up on 7/30. She was wondering if you would want to get a scan done before his appt so that you can have images to review at his appt due to his increased headaches. He has hydrocephalus. Please advise, thanks.

## 2019-07-30 ENCOUNTER — OFFICE VISIT (OUTPATIENT)
Dept: NEUROLOGY | Age: 34
End: 2019-07-30
Payer: COMMERCIAL

## 2019-07-30 VITALS
SYSTOLIC BLOOD PRESSURE: 116 MMHG | DIASTOLIC BLOOD PRESSURE: 74 MMHG | HEIGHT: 74 IN | WEIGHT: 202.2 LBS | BODY MASS INDEX: 25.95 KG/M2 | HEART RATE: 75 BPM

## 2019-07-30 DIAGNOSIS — G91.8 CHRONIC BRAIN-HYDROCEPHALUS SYNDROME (HCC): Primary | ICD-10-CM

## 2019-07-30 DIAGNOSIS — R51.9 HEADACHE DUE TO INTRACRANIAL DISEASE: ICD-10-CM

## 2019-07-30 DIAGNOSIS — G44.201 ACUTE INTRACTABLE TENSION-TYPE HEADACHE: ICD-10-CM

## 2019-07-30 DIAGNOSIS — Z98.2 S/P VENTRICULOPERITONEAL SHUNT: ICD-10-CM

## 2019-07-30 DIAGNOSIS — G93.9 HEADACHE DUE TO INTRACRANIAL DISEASE: ICD-10-CM

## 2019-07-30 PROCEDURE — 1036F TOBACCO NON-USER: CPT | Performed by: NURSE PRACTITIONER

## 2019-07-30 PROCEDURE — G8427 DOCREV CUR MEDS BY ELIG CLIN: HCPCS | Performed by: NURSE PRACTITIONER

## 2019-07-30 PROCEDURE — 99214 OFFICE O/P EST MOD 30 MIN: CPT | Performed by: NURSE PRACTITIONER

## 2019-07-30 PROCEDURE — G8417 CALC BMI ABV UP PARAM F/U: HCPCS | Performed by: NURSE PRACTITIONER

## 2019-07-30 RX ORDER — AMITRIPTYLINE HYDROCHLORIDE 25 MG/1
50 TABLET, FILM COATED ORAL NIGHTLY
Qty: 60 TABLET | Refills: 5 | Status: SHIPPED | OUTPATIENT
Start: 2019-07-30 | End: 2020-02-24

## 2019-07-30 RX ORDER — BUTALBITAL, ACETAMINOPHEN AND CAFFEINE 50; 325; 40 MG/1; MG/1; MG/1
1 TABLET ORAL 2 TIMES DAILY PRN
Qty: 60 TABLET | Refills: 5 | Status: SHIPPED | OUTPATIENT
Start: 2019-07-30 | End: 2020-02-24 | Stop reason: ALTCHOICE

## 2019-07-30 RX ORDER — TOPIRAMATE 100 MG/1
100 TABLET, FILM COATED ORAL 2 TIMES DAILY
Qty: 60 TABLET | Refills: 11 | Status: SHIPPED | OUTPATIENT
Start: 2019-07-30 | End: 2020-02-24

## 2019-07-30 NOTE — PROGRESS NOTES
[facility]            [address]          [place], [address] [facility]          Dept: [phone]          Dept Fax: [phone]        MD Candace Duval MD Ahmed B. Loni Pink, MD Donnetta Ammon, MD Catheryn Seat, MD Kaya Kirkland, CNP            7/30/2019      HISTORY OF PRESENT ILLNESS:       I had the pleasure of seeing Shaista Diaz, who returns for continuing neurologic care. Patient is a 65-year-old man who was seen last on August 30, 2018 for management of chronic hydrocephalus. The patient developed a mastoid infection at the age of 11 at the age of 15, developed hydrocephalus. In 2001 he had his first ventriculoperitoneal shunt performed by Dr. Thom Canchola, with multiple subsequent shunt revisions in 2003, 2007, 2013, and 2014. Patient has had chronic headaches which have been progressive since January 2014. An MRI of the brain in 2014 showed dilation of the ventricular system despite the presence of shunts along the transependymal migration of fluid. A repeat MRI in May 2017 showed stable ventricular volumes and configuration with unchanged position of the bilateral parietal approach ventriculoperitoneal shunt catheters. It also showed a stable right posterior fossa resection cavity and a stable benign-appearing multicystic lesion along the posterior septum pellucidum and posterior third ventricle. Patient had been treated with Topamax and at his last visit this was increased to 100 mg twice daily. The patient also suffers from chronic intermittent diplopia. And had called a few weeks ago complaining of increased symptoms including nausea and an increase in his headaches. An MRI of the brain was ordered which was not covered by his insurance and a CT scan of the brain on July 24, which showed stable ventricular system since his prior imaging.   Patient is here Sexual activity: Yes     Partners: Female   Lifestyle    Physical activity:     Days per week: Not on file     Minutes per session: Not on file    Stress: Not on file   Relationships    Social connections:     Talks on phone: Not on file     Gets together: Not on file     Attends [employer] service: Not on file     Active member of club or organization: Not on file     Attends meetings of clubs or organizations: Not on file     Relationship status: Not on file    Intimate partner violence:     Fear of current or ex partner: Not on file     Emotionally abused: Not on file     Physically abused: Not on file     Forced sexual activity: Not on file   Other Topics Concern    Not on file   Social History Narrative    Not on file       CURRENT MEDICATIONS:     Current Outpatient Medications   Medication Sig Dispense Refill    amitriptyline (ELAVIL) 25 MG tablet Take 2 tablets by mouth nightly 60 tablet 5    topiramate (TOPAMAX) 100 MG tablet Take 1 tablet by mouth 2 times daily 60 tablet 11    butalbital-acetaminophen-caffeine (FIORICET, ESGIC) -40 MG per tablet Take 1 tablet by mouth 2 times daily as needed for Headaches 60 tablet 5     No current facility-administered medications for this visit.          ALLERGIES:   No Known Allergies                              REVIEW OF SYSTEMS    CONSTITUTIONAL Weight: absent, Appetite: absent, Fatigue: absent      HEENT Ears: normal, Visual disturbance: absent   RESPIRATORY Shortness of breath: absent, Cough: absent   CARDIOVASCULAR Chest pain: absent, Leg swelling :absent      GI Constipation: absent, Diarrhea: absent, Swallowing change: absent       Urinary frequency: absent, Urinary urgency: absent,   MUSCULOSKELETAL Neck pain: absent, Back pain: absent, Stiffness: absent, Muscle pain: absent, Joint pain: absent Restless legs: absent   DERMATOLOGIC Hair loss: absent, Skin changes: absent   NEUROLOGIC Memory loss: present, Confusion: absent, Seizures: absent

## 2019-08-08 DIAGNOSIS — G91.8 CHRONIC BRAIN-HYDROCEPHALUS SYNDROME (HCC): ICD-10-CM

## 2019-08-08 DIAGNOSIS — G93.9 HEADACHE DUE TO INTRACRANIAL DISEASE: ICD-10-CM

## 2019-08-08 DIAGNOSIS — R51.9 HEADACHE DUE TO INTRACRANIAL DISEASE: ICD-10-CM

## 2019-09-16 ENCOUNTER — TELEPHONE (OUTPATIENT)
Dept: NEUROLOGY | Age: 34
End: 2019-09-16

## 2019-09-17 RX ORDER — TOPIRAMATE 25 MG/1
25 TABLET ORAL 2 TIMES DAILY
Qty: 60 TABLET | Refills: 3 | Status: SHIPPED | OUTPATIENT
Start: 2019-09-17 | End: 2020-02-24

## 2020-02-24 ENCOUNTER — OFFICE VISIT (OUTPATIENT)
Dept: NEUROLOGY | Age: 35
End: 2020-02-24
Payer: COMMERCIAL

## 2020-02-24 VITALS
BODY MASS INDEX: 24.77 KG/M2 | HEART RATE: 73 BPM | SYSTOLIC BLOOD PRESSURE: 97 MMHG | HEIGHT: 74 IN | WEIGHT: 193 LBS | DIASTOLIC BLOOD PRESSURE: 51 MMHG

## 2020-02-24 PROBLEM — G44.221 CHRONIC TENSION-TYPE HEADACHE, INTRACTABLE: Status: ACTIVE | Noted: 2019-07-30

## 2020-02-24 PROCEDURE — G8484 FLU IMMUNIZE NO ADMIN: HCPCS | Performed by: NURSE PRACTITIONER

## 2020-02-24 PROCEDURE — G8420 CALC BMI NORM PARAMETERS: HCPCS | Performed by: NURSE PRACTITIONER

## 2020-02-24 PROCEDURE — 1036F TOBACCO NON-USER: CPT | Performed by: NURSE PRACTITIONER

## 2020-02-24 PROCEDURE — G8427 DOCREV CUR MEDS BY ELIG CLIN: HCPCS | Performed by: NURSE PRACTITIONER

## 2020-02-24 PROCEDURE — 99214 OFFICE O/P EST MOD 30 MIN: CPT | Performed by: NURSE PRACTITIONER

## 2020-02-24 RX ORDER — TOPIRAMATE 50 MG/1
50 TABLET, FILM COATED ORAL 2 TIMES DAILY
Qty: 60 TABLET | Refills: 11 | Status: SHIPPED | OUTPATIENT
Start: 2020-02-24 | End: 2021-02-19

## 2020-02-24 RX ORDER — TOPIRAMATE 100 MG/1
100 TABLET, FILM COATED ORAL 2 TIMES DAILY
Qty: 60 TABLET | Refills: 11 | Status: SHIPPED | OUTPATIENT
Start: 2020-02-24 | End: 2021-02-22

## 2020-02-24 NOTE — PROGRESS NOTES
[facility]            [address]          [place], [address]          Dept: [phone]          Dept Fax: [phone]        MD Yamilet David MD Ahmed B. Shearon Seltzer, MD Shereen Schooling, MD Mildred Kos, MD Zettie Sauger, CNP            2/24/2020      HISTORY OF PRESENT ILLNESS:       I had the pleasure of seeing Nevin Kamara, who returns for continuing neurologic care. Patient is a 16-year-old man who was seen last on July 30, 2019 for management of chronic hydrocephalus. The patient developed a mastoid infection at the age of 11 at the age of 15, developed hydrocephalus. In 2001 he had his first ventriculoperitoneal shunt performed by Dr. Sarah Ortiz, with multiple subsequent shunt revisions in 2003, 2007, 2013, and 2014. Patient has had chronic headaches which have been progressive since January 2014. An MRI of the brain in 2014 showed dilation of the ventricular system despite the presence of shunts along the transependymal migration of fluid. A repeat MRI in May 2017 showed stable ventricular volumes and configuration with unchanged position of the bilateral parietal approach ventriculoperitoneal shunt catheters. It also showed a stable right posterior fossa resection cavity and a stable benign-appearing multicystic lesion along the posterior septum pellucidum and posterior third ventricle. Patient had been treated with Topamax and at his August 2018  visit this was increased to 100 mg twice daily. The patient also suffers from chronic intermittent diplopia. And had called in July 2019 complaining of increased symptoms including nausea and an increase in his headaches. An MRI of the brain was ordered which was not covered by his insurance and a CT scan of the brain on July 24, which showed stable ventricular system since his prior imaging.  At his last

## 2020-04-20 ENCOUNTER — TELEMEDICINE (OUTPATIENT)
Dept: PRIMARY CARE CLINIC | Age: 35
End: 2020-04-20
Payer: COMMERCIAL

## 2020-04-20 VITALS — WEIGHT: 199 LBS | HEART RATE: 92 BPM | TEMPERATURE: 96.8 F | HEIGHT: 74 IN | BODY MASS INDEX: 25.54 KG/M2

## 2020-04-20 PROBLEM — K12.2 ABSCESS OF ORAL TISSUE: Status: ACTIVE | Noted: 2020-04-20

## 2020-04-20 PROCEDURE — G8417 CALC BMI ABV UP PARAM F/U: HCPCS | Performed by: PHYSICIAN ASSISTANT

## 2020-04-20 PROCEDURE — 1036F TOBACCO NON-USER: CPT | Performed by: PHYSICIAN ASSISTANT

## 2020-04-20 PROCEDURE — G8427 DOCREV CUR MEDS BY ELIG CLIN: HCPCS | Performed by: PHYSICIAN ASSISTANT

## 2020-04-20 PROCEDURE — 99213 OFFICE O/P EST LOW 20 MIN: CPT | Performed by: PHYSICIAN ASSISTANT

## 2020-04-20 RX ORDER — AMOXICILLIN AND CLAVULANATE POTASSIUM 875; 125 MG/1; MG/1
1 TABLET, FILM COATED ORAL 2 TIMES DAILY
Qty: 20 TABLET | Refills: 0 | Status: SHIPPED | OUTPATIENT
Start: 2020-04-20 | End: 2020-04-30

## 2020-04-20 ASSESSMENT — PATIENT HEALTH QUESTIONNAIRE - PHQ9
SUM OF ALL RESPONSES TO PHQ QUESTIONS 1-9: 0
SUM OF ALL RESPONSES TO PHQ9 QUESTIONS 1 & 2: 0
2. FEELING DOWN, DEPRESSED OR HOPELESS: 0
SUM OF ALL RESPONSES TO PHQ QUESTIONS 1-9: 0
1. LITTLE INTEREST OR PLEASURE IN DOING THINGS: 0

## 2020-04-20 ASSESSMENT — ENCOUNTER SYMPTOMS
SHORTNESS OF BREATH: 0
CHOKING: 0
SORE THROAT: 0
SINUS PAIN: 0
TROUBLE SWALLOWING: 1
SINUS PRESSURE: 0
VOMITING: 0
RHINORRHEA: 0
VOICE CHANGE: 1
COUGH: 0
NAUSEA: 0
EYES NEGATIVE: 1

## 2020-04-20 NOTE — PROGRESS NOTES
The patient (and/or legal guardian) has also been advised to contact this office for worsening conditions or problems, and seek emergency medical treatment and/or call 911 if deemed necessary. Services were provided through a video synchronous discussion virtually to substitute for in-person clinic visit. Patient and provider were located at their individual homes. --Tr Bradford PA-C on 4/20/2020 at 12:00 PM    An electronic signature was used to authenticate this note. No follow-ups on file. No orders of the defined types were placed in this encounter.     Orders Placed This Encounter   Medications    amoxicillin-clavulanate (AUGMENTIN) 875-125 MG per tablet     Sig: Take 1 tablet by mouth 2 times daily for 10 days     Dispense:  20 tablet     Refill:  0           Electronically signed by Fredi Barnes 4/20/2020 at 11:58 AM

## 2020-05-21 ENCOUNTER — TELEPHONE (OUTPATIENT)
Dept: NEUROLOGY | Age: 35
End: 2020-05-21

## 2020-05-21 ENCOUNTER — HOSPITAL ENCOUNTER (OUTPATIENT)
Dept: CT IMAGING | Age: 35
Discharge: HOME | End: 2020-05-23
Payer: COMMERCIAL

## 2020-05-21 PROCEDURE — 70450 CT HEAD/BRAIN W/O DYE: CPT

## 2020-05-21 RX ORDER — PREDNISONE 20 MG/1
TABLET ORAL
Qty: 18 TABLET | Refills: 0 | Status: SHIPPED | OUTPATIENT
Start: 2020-05-21 | End: 2020-08-24

## 2020-05-21 NOTE — TELEPHONE ENCOUNTER
Call placed to the patient and this information was given. Mojgan Villalobos did say that he has had CT's in the past that were normal, but then the MRI was abnormal.  He is willing to try the pred taper but he is still concerned that there is an issue with the shunt. Please advise.

## 2020-05-21 NOTE — TELEPHONE ENCOUNTER
----- Message from JAMAR Dominguez CNP sent at 5/21/2020  2:07 PM EDT -----  Can you please call perry and let him know his CT is ok.   Tell him to let us know if the prednisone does not help his headache to try something else

## 2020-05-21 NOTE — TELEPHONE ENCOUNTER
Call placed to the patient and this information was given. Call placed to [phone] [facility] scheduling dept and I spoke with [facility] and she will contact the patient to schedule.

## 2020-05-21 NOTE — TELEPHONE ENCOUNTER
This was discussed with Son Cha. She would like to get a stat CT brain without contrast.  She is also offering a prednisone taper if the patient is agreeable. Call placed to Mr. Blanche Ruiz and this information was given. Patient is agreeable with both things. He would like to go to [place] if possible. Call placed to [facility] scheduling dept and I spoke with Oscar Genao. She will contact the patient to schedule this. Pred taper will also be generated and sent to Son Cha for her approval.  Once signed Rx will go electronically to the pharmacy.

## 2020-05-21 NOTE — TELEPHONE ENCOUNTER
Patient called the office this morning with complaints of a headache for the past two weeks. This is located in the back of the head as well as the top of the head. Patient states that it is a constant throbbing pain that will intensify when he coughs. Patient denies any other symptom. I did confirm that he continues to use Topamax 150 mg BID. Patient is fearful that his shunt is blocked and is requesting an MRI. Please advise.

## 2020-05-21 NOTE — TELEPHONE ENCOUNTER
Well, he really does know his past which is rather complicated. Lets get MRI brain with and without stat.

## 2020-05-26 ENCOUNTER — TELEPHONE (OUTPATIENT)
Dept: NEUROLOGY | Age: 35
End: 2020-05-26

## 2020-05-26 NOTE — LETTER
[facility]  [address] [facility] [address]  Phone: [phone]  Fax: [phone]    JAMAR Anne CNP        May 27, 2020     Patient: Alley Pandya   YOB: 1985   Date of Visit: 5/26/2020       To Whom It May Concern: It is my medical opinion that Thamas Lights may return to work 5/26/2020 without any restrictions. If you have any questions or concerns, please don't hesitate to call.     Sincerely,          JAMAR Anne - CNP

## 2020-08-24 ENCOUNTER — OFFICE VISIT (OUTPATIENT)
Dept: NEUROLOGY | Age: 35
End: 2020-08-24
Payer: COMMERCIAL

## 2020-08-24 VITALS
DIASTOLIC BLOOD PRESSURE: 73 MMHG | TEMPERATURE: 97.5 F | HEIGHT: 74 IN | WEIGHT: 201 LBS | SYSTOLIC BLOOD PRESSURE: 120 MMHG | HEART RATE: 86 BPM | BODY MASS INDEX: 25.8 KG/M2

## 2020-08-24 PROBLEM — G43.019 INTRACTABLE MIGRAINE WITHOUT AURA AND WITHOUT STATUS MIGRAINOSUS: Status: ACTIVE | Noted: 2020-08-24

## 2020-08-24 PROCEDURE — 99214 OFFICE O/P EST MOD 30 MIN: CPT | Performed by: NURSE PRACTITIONER

## 2020-08-24 PROCEDURE — G8427 DOCREV CUR MEDS BY ELIG CLIN: HCPCS | Performed by: NURSE PRACTITIONER

## 2020-08-24 PROCEDURE — 1036F TOBACCO NON-USER: CPT | Performed by: NURSE PRACTITIONER

## 2020-08-24 PROCEDURE — G8417 CALC BMI ABV UP PARAM F/U: HCPCS | Performed by: NURSE PRACTITIONER

## 2020-08-24 RX ORDER — ERENUMAB-AOOE 70 MG/ML
70 INJECTION SUBCUTANEOUS
Qty: 1 PEN | Refills: 5 | Status: SHIPPED | OUTPATIENT
Start: 2020-08-24 | End: 2021-01-28

## 2020-08-24 NOTE — PROGRESS NOTES
[facility]            [address]          [facility], [address] [facility]          Dept: [phone]          Dept Fax: [phone]        MD Tamara Barraza MD Ahmed B. Geraldyne Pellet, MD Jerry Shorts, MD Soyla Caddy, MD Natalia Burch, CNP            8/24/2020      HISTORY OF PRESENT ILLNESS:       I had the pleasure of seeing Walker Ochoa, who returns for continuing neurologic care. Patient is a 21-year-old man who was seen last on February 24, 2020 for management of headaches and chronic hydrocephalus. 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 In 2001 he had his first ventriculoperitoneal shunt performed by Dr. Donna Dockery. He had multiple subsequent shunt revisions in 2003, 2007, 2013, and 2014. The patient has had chronic headaches which have been progressive since January 2014. The patient has had multiple MRIs over the years, showing stability of his ventricles and shunt placement. The patient had been getting tension type headaches likely associated with sleep deprivation. The patient works greater than 12 hours/day 6 days/week. He also has 5 children and getting very little sleep. He been started on amitriptyline which she found to be too sedating. He was then started on Topamax and titrated to 150 mg twice daily. Upon review of the patient today, he is getting headaches 5 days/week and 3 days/week he will get a migrainous type headache. His migrainous type headaches are an 8/10 in intensity. They are typically bitemporal.  The migraine type headaches are associated with photophobia and phonophobia, but not nausea. They can last for 3 to 4 hours if not 8 to 10 hours. He typically will use over-the-counter medications to relieve his headaches.   He has tried Fioricet in the past which did not relieve the headaches, he is also tried Imitrex in the past.                  Prior testing reviewed:  -MRI brain 5/22/20 no significant abnormalities demonstrated. Shunt tubing appears intact on shunt series. Cystic abnormality in the midline posterior to the third ventricle/midbrain with 2 posterior parietal shunt catheters with tips in the adjacent cyst.  Old right cerebellar infarct. No acute hemorrhage, midline shift, or hydrocephalus.   -MRI brain 8/8/19   Alonna List is been interval placement of a second left-sided shunt catheter with significant interval decompression of the ventricular system, especially on the left.  The transependymal migration of CSF has lessened.  The frontal horns are nearly slit-like.  There is no evidence of new bleed,   pathologic contrast enhancement or pathologic extra-axial fluid collection to explain the patient's headaches.]               PAST MEDICAL HISTORY:         Diagnosis Date    Acute bronchitis due to infection 12/5/2016    Back pain     Diplopia     Eczema     Headache(784.0)     Hearing loss     Hydrocephalus with operating shunt ([address]         PAST SURGICAL HISTORY:         Procedure Laterality Date    CHOLECYSTECTOMY, LAPAROSCOPIC  2001    MASTOIDECTOMY Right 2001    VENTRICULOPERITONEAL SHUNT      VENTRICULOPERITONEAL SHUNT  06/23/2014    REVISION        SOCIAL HISTORY:     Social History     Socioeconomic History    Marital status:      Spouse name: Not on file    Number of children: Not on file    Years of education: Not on file    Highest education level: Not on file   Occupational History    Not on file   Social Needs    Financial resource strain: Not on file    Food insecurity     Worry: Not on file     Inability: Not on file   [employer] needs     Medical: Not on file     Non-medical: Not on file   Tobacco Use    Smoking status: Former Smoker     Packs/day: 0.50     Years: 20.00     Pack years: 10.00     Types: Cigarettes     Last attempt to quit: 9/3/2015     Years since quittin.9    Smokeless tobacco: Never Used   Substance and Sexual Activity    Alcohol use: No     Alcohol/week: 0.0 standard drinks    Drug use: No    Sexual activity: Yes     Partners: Female   Lifestyle    Physical activity     Days per week: Not on file     Minutes per session: Not on file    Stress: Not on file   Relationships    Social connections     Talks on phone: Not on file     Gets together: Not on file     Attends Holiness service: Not on file     Active member of club or organization: Not on file     Attends meetings of clubs or organizations: Not on file     Relationship status: Not on file    Intimate partner violence     Fear of current or ex partner: Not on file     Emotionally abused: Not on file     Physically abused: Not on file     Forced sexual activity: Not on file   Other Topics Concern    Not on file   Social History Narrative    Not on file       CURRENT MEDICATIONS:     Current Outpatient Medications   Medication Sig Dispense Refill    topiramate (TOPAMAX) 100 MG tablet Take 1 tablet by mouth 2 times daily 60 tablet 11    topiramate (TOPAMAX) 50 MG tablet Take 1 tablet by mouth 2 times daily In addition to the 100 mg tablet for a total of 125 mg twice daily (Patient taking differently: Take 50 mg by mouth 2 times daily In addition to the 100 mg tablet for a total of 300 mg daily) 60 tablet 11    predniSONE (DELTASONE) 20 MG tablet Take 3 po qd x 3 days, 2 po qd x 3 days then 1 po qd x 3 days (Patient not taking: Reported on 2020) 18 tablet 0     No current facility-administered medications for this visit. ALLERGIES:   No Known Allergies                              REVIEW OF SYSTEMS                   All items selected indicate a positive finding. Those items not selected are negative.   Constitutional [] Weight loss/gain   [] Fatigue  [] Fever/Chills   HEENT [] Hearing Loss  [x] Visual Disturbance  [] Tinnitus  [] Eye pain Respiratory [] Shortness of Breath  [] Cough  [] Snoring   Cardiovascular [] Chest Pain  [] Palpitations  [] Lightheaded   GI [] Constipation  [] Diarrhea  [] Swallowing change  [] Nausea/vomiting    [] Urinary Frequency  [] Urinary Urgency   Musculoskeletal [] Neck pain  [] Back pain  [] Muscle pain  [] Restless legs   Dermatologic [] Skin changes   Neurologic [] Memory loss/confusion  [] Seizures  [] Trouble walking or imbalance  [] Dizziness  [] Sleep disturbance  [] Weakness  [] Numbness  [] Tremors  [] Speech Difficulty  [x] Headaches  [x] Light Sensitivity  [x] Sound Sensitivity   Endocrinology []Excessive thirst  []Excessive hunger   Psychiatric [] Anxiety/Depression  [] Hallucination   Allergy/immunology []Hives/environmental allergies   Hematologic/lymph [] Abnormal bleeding  [] Abnormal bruising         PHYSICAL EXAMINATION:       Vitals:    08/24/20 0856   BP: 120/73   Pulse: 86   Temp: 97.5 °F (36.4 °C)                                              .                                                                                                     General Appearance:  Alert, cooperative, no signs of distress, appears stated age   Head:  Normocephalic, no signs of trauma   Eyes:  Conjunctiva/corneas clear;  eyelids intact   Ears:  Normal external ear and canals   Nose: Nares normal, mucosa normal, no drainage    Throat: Lips and tongue normal; teeth normal;  gums normal   Neck: Supple, intact flexion, extension and rotation;   trachea midline;  no adenopathy;   thyroid: not enlarged;   no carotid pulse abnormality   Back:   Symmetric, no curvature, ROM adequate   Lungs:   Respirations unlabored   Heart:  Regular rate and rhythm           Extremities: Extremities normal, no cyanosis, no edema   Pulses: Symmetric over head and neck   Skin: Skin color, texture normal, no rashes, no lesions                                     NEUROLOGIC EXAMINATION    Neurologic Exam  Mental status    Alert and oriented x 3; intact memory with no confusion, speech or language problems; no hallucinations or delusions  Fund of information appropriate for level of education    Cranial nerves    II - visual fields intact to confrontation bilaterally  III, IV, VI - extra-ocular muscles full: no pupillary defect; no VERONICA, no nystagmus, no ptosis   V - normal facial sensation                                                               VII - normal facial symmetry                                                             VIII - intact hearing                                                                             IX, X - symmetrical palate                                                                  XI - symmetrical shoulder shrug                                                       XII - tongue midline without atrophy or fasciculation      Motor function  Normal muscle bulk and tone; strength 5/5 on all 4 extremities, no pronator drift      Sensory function Intact to light touch, pinprick, vibration, proprioception on all 4 extremities      Cerebellar Intact fine motor movement. No involuntary movements or tremors. No ataxia or dysmetria on finger to nose or heel to shin testing      Reflex function DTR 2+ on bilateral UE and LE, symmetric. Negative Babinski      Gait                   normal base and arm swing                  ASSESSMENT AND PLAN:           In summary, your patient, Miladis Roldan exhibits the following, with associated plan:    1. Chronic tension type headaches that can be 6 days/week  2. Intractable chronic migraine headaches getting 6 headache days per week with at least 3-4 being migraines. Previous medications include amitriptyline and Topamax  1. Continue Topamax 150 mg twice daily  2. Add Aimovig 70 mg every 30 days  3. The patient will contact the office in 2 months if the [address] [place] [address] Road is not effective in relieving his headaches  4. The patient will otherwise return in follow-up in 6 months  3.  Intermittent diplopia secondary to hydrocephalus syndrome which is chronic            Signed: Shana Peters, SOURAV      *Please note that portions of this note were completed with a voice recognition program.  Although every effort was made to insure the accuracy of this automated transcription, some errors in transcription may have occurred, occasionally words and are mis-transcribed

## 2020-09-03 ENCOUNTER — TELEPHONE (OUTPATIENT)
Dept: NEUROLOGY | Age: 35
End: 2020-09-03

## 2020-09-04 ENCOUNTER — TELEPHONE (OUTPATIENT)
Dept: NEUROLOGY | Age: 35
End: 2020-09-04

## 2020-09-04 NOTE — TELEPHONE ENCOUNTER
Char Harrell called in stating that he hit his head two days ago. He said that he has had a headache since then. He would like to know if Diana Gay can call him in something for the pain. I informed him that I would let her know and that in the mean time he should call his PCP or go to the emergency room.

## 2020-09-09 ENCOUNTER — APPOINTMENT (OUTPATIENT)
Dept: CT IMAGING | Age: 35
End: 2020-09-09
Payer: COMMERCIAL

## 2020-09-09 ENCOUNTER — APPOINTMENT (OUTPATIENT)
Dept: GENERAL RADIOLOGY | Age: 35
End: 2020-09-09
Payer: COMMERCIAL

## 2020-09-09 ENCOUNTER — HOSPITAL ENCOUNTER (EMERGENCY)
Age: 35
Discharge: HOME | End: 2020-09-09
Attending: EMERGENCY MEDICINE
Payer: COMMERCIAL

## 2020-09-09 VITALS
OXYGEN SATURATION: 98 % | TEMPERATURE: 98.3 F | HEART RATE: 57 BPM | SYSTOLIC BLOOD PRESSURE: 109 MMHG | RESPIRATION RATE: 16 BRPM | WEIGHT: 200 LBS | HEIGHT: 74 IN | BODY MASS INDEX: 25.67 KG/M2 | DIASTOLIC BLOOD PRESSURE: 82 MMHG

## 2020-09-09 LAB
ABSOLUTE EOS #: 0.5 K/UL (ref 0–0.4)
ABSOLUTE IMMATURE GRANULOCYTE: ABNORMAL K/UL (ref 0–0.3)
ABSOLUTE LYMPH #: 2.4 K/UL (ref 1–4.8)
ABSOLUTE MONO #: 0.6 K/UL (ref 0.1–1.3)
ANION GAP SERPL CALCULATED.3IONS-SCNC: 11 MMOL/L (ref 9–17)
BASOPHILS # BLD: 1 % (ref 0–2)
BASOPHILS ABSOLUTE: 0.1 K/UL (ref 0–0.2)
BUN BLDV-MCNC: 14 MG/DL (ref 6–20)
BUN/CREAT BLD: ABNORMAL (ref 9–20)
CALCIUM SERPL-MCNC: 8.5 MG/DL (ref 8.6–10.4)
CHLORIDE BLD-SCNC: 109 MMOL/L (ref 98–107)
CO2: 19 MMOL/L (ref 20–31)
CREAT SERPL-MCNC: 0.89 MG/DL (ref 0.7–1.2)
DIFFERENTIAL TYPE: ABNORMAL
EOSINOPHILS RELATIVE PERCENT: 6 % (ref 0–4)
GFR AFRICAN AMERICAN: >60 ML/MIN
GFR NON-AFRICAN AMERICAN: >60 ML/MIN
GFR SERPL CREATININE-BSD FRML MDRD: ABNORMAL ML/MIN/{1.73_M2}
GFR SERPL CREATININE-BSD FRML MDRD: ABNORMAL ML/MIN/{1.73_M2}
GLUCOSE BLD-MCNC: 103 MG/DL (ref 70–99)
HCT VFR BLD CALC: 43.9 % (ref 41–53)
HEMOGLOBIN: 14.9 G/DL (ref 13.5–17.5)
IMMATURE GRANULOCYTES: ABNORMAL %
LYMPHOCYTES # BLD: 27 % (ref 24–44)
MCH RBC QN AUTO: 32.7 PG (ref 26–34)
MCHC RBC AUTO-ENTMCNC: 33.9 G/DL (ref 31–37)
MCV RBC AUTO: 96.4 FL (ref 80–100)
MONOCYTES # BLD: 7 % (ref 1–7)
NRBC AUTOMATED: ABNORMAL PER 100 WBC
PDW BLD-RTO: 13.2 % (ref 11.5–14.9)
PLATELET # BLD: 210 K/UL (ref 150–450)
PLATELET ESTIMATE: ABNORMAL
PMV BLD AUTO: 8.4 FL (ref 6–12)
POTASSIUM SERPL-SCNC: 3.5 MMOL/L (ref 3.7–5.3)
RBC # BLD: 4.55 M/UL (ref 4.5–5.9)
RBC # BLD: ABNORMAL 10*6/UL
SEG NEUTROPHILS: 59 % (ref 36–66)
SEGMENTED NEUTROPHILS ABSOLUTE COUNT: 5.3 K/UL (ref 1.3–9.1)
SODIUM BLD-SCNC: 139 MMOL/L (ref 135–144)
WBC # BLD: 8.9 K/UL (ref 3.5–11)
WBC # BLD: ABNORMAL 10*3/UL

## 2020-09-09 PROCEDURE — 99284 EMERGENCY DEPT VISIT MOD MDM: CPT

## 2020-09-09 PROCEDURE — 2580000003 HC RX 258: Performed by: GENERAL PRACTICE

## 2020-09-09 PROCEDURE — 96375 TX/PRO/DX INJ NEW DRUG ADDON: CPT

## 2020-09-09 PROCEDURE — 6360000002 HC RX W HCPCS: Performed by: GENERAL PRACTICE

## 2020-09-09 PROCEDURE — 85025 COMPLETE CBC W/AUTO DIFF WBC: CPT

## 2020-09-09 PROCEDURE — 71045 X-RAY EXAM CHEST 1 VIEW: CPT

## 2020-09-09 PROCEDURE — 70450 CT HEAD/BRAIN W/O DYE: CPT

## 2020-09-09 PROCEDURE — 36415 COLL VENOUS BLD VENIPUNCTURE: CPT

## 2020-09-09 PROCEDURE — 80048 BASIC METABOLIC PNL TOTAL CA: CPT

## 2020-09-09 PROCEDURE — 96374 THER/PROPH/DIAG INJ IV PUSH: CPT

## 2020-09-09 RX ORDER — KETOROLAC TROMETHAMINE 30 MG/ML
30 INJECTION, SOLUTION INTRAMUSCULAR; INTRAVENOUS ONCE
Status: COMPLETED | OUTPATIENT
Start: 2020-09-09 | End: 2020-09-09

## 2020-09-09 RX ORDER — 0.9 % SODIUM CHLORIDE 0.9 %
1000 INTRAVENOUS SOLUTION INTRAVENOUS ONCE
Status: COMPLETED | OUTPATIENT
Start: 2020-09-09 | End: 2020-09-09

## 2020-09-09 RX ORDER — DIPHENHYDRAMINE HYDROCHLORIDE 50 MG/ML
25 INJECTION INTRAMUSCULAR; INTRAVENOUS ONCE
Status: COMPLETED | OUTPATIENT
Start: 2020-09-09 | End: 2020-09-09

## 2020-09-09 RX ORDER — PROCHLORPERAZINE EDISYLATE 5 MG/ML
10 INJECTION INTRAMUSCULAR; INTRAVENOUS EVERY 6 HOURS PRN
Status: DISCONTINUED | OUTPATIENT
Start: 2020-09-09 | End: 2020-09-09 | Stop reason: HOSPADM

## 2020-09-09 RX ADMIN — KETOROLAC TROMETHAMINE 30 MG: 30 INJECTION, SOLUTION INTRAMUSCULAR at 19:07

## 2020-09-09 RX ADMIN — PROCHLORPERAZINE EDISYLATE 10 MG: 5 INJECTION INTRAMUSCULAR; INTRAVENOUS at 17:45

## 2020-09-09 RX ADMIN — SODIUM CHLORIDE 1000 ML: 9 INJECTION, SOLUTION INTRAVENOUS at 19:06

## 2020-09-09 RX ADMIN — SODIUM CHLORIDE 1000 ML: 9 INJECTION, SOLUTION INTRAVENOUS at 17:45

## 2020-09-09 RX ADMIN — DIPHENHYDRAMINE HYDROCHLORIDE 25 MG: 50 INJECTION INTRAMUSCULAR; INTRAVENOUS at 17:46

## 2020-09-09 ASSESSMENT — PAIN SCALES - GENERAL
PAINLEVEL_OUTOF10: 4
PAINLEVEL_OUTOF10: 6

## 2020-09-09 ASSESSMENT — ENCOUNTER SYMPTOMS
RESPIRATORY NEGATIVE: 1
EYES NEGATIVE: 1
GASTROINTESTINAL NEGATIVE: 1

## 2020-09-09 NOTE — ED PROVIDER NOTES
[address] ED  Emergency Department Encounter  EmergencyMedicine Resident     Pt Name:Marcin Cordoba  MRN: [id number]  [place] 1985  Date of evaluation: 9/9/20  PCP:  Carmen Ewing MD    CHIEF COMPLAINT       Chief Complaint   Patient presents with    Migraine       HISTORY OF PRESENT ILLNESS  (Location/Symptom, Timing/Onset, Context/Setting, Quality, Duration, Modifying Factors, Severity.)      León Guillen is a 38 y.o. male who presents with 1 week history of migraine headache. Patient states he bumped his head on something (he cannot recall what) in approximately 1 week ago and has had a headache since. He called his neurologist but has not heard back from them. He has a history of an intraventricular shunt placed in 2001 with multiple revisions, the last in 2014. He has a history of chronic migraine headaches, and per the neuro notes this is likely due to the chronic sleep deprivation. Patient states he only sleeps 4 to 6 hours per night and works long shifts. Patient takes Topamax and aimovig for these headaches and has been compliant with his medications. PAST MEDICAL / SURGICAL / SOCIAL / FAMILY HISTORY      has a past medical history of Acute bronchitis due to infection, Back pain, Diplopia, Eczema, Headache(784.0), Hearing loss, and Hydrocephalus with operating shunt ([facility] [address] has a past surgical history that includes Ventriculoperitoneal shunt; Cholecystectomy, laparoscopic (2001); mastoidectomy (Right, 2001); and Ventriculoperitoneal shunt (06/23/2014).       Social History     Socioeconomic History    Marital status:      Spouse name: Not on file    Number of children: Not on file    Years of education: Not on file    Highest education level: Not on file   Occupational History    Not on file   Social Needs    Financial resource strain: Not on file    Food insecurity     Worry: Not on file     Inability: Not on file    Transportation needs     Medical: Not on file     Non-medical: Not on file   Tobacco Use    Smoking status: Former Smoker     Packs/day: 0.50     Years: 20.00     Pack years: 10.00     Types: Cigarettes     Last attempt to quit: 9/3/2015     Years since quittin.0    Smokeless tobacco: Never Used   Substance and Sexual Activity    Alcohol use: No     Alcohol/week: 0.0 standard drinks    Drug use: No    Sexual activity: Yes     Partners: Female   Lifestyle    Physical activity     Days per week: Not on file     Minutes per session: Not on file    Stress: Not on file   Relationships    Social connections     Talks on phone: Not on file     Gets together: Not on file     Attends Gnosticism service: Not on file     Active member of club or organization: Not on file     Attends meetings of clubs or organizations: Not on file     Relationship status: Not on file    Intimate partner violence     Fear of current or ex partner: Not on file     Emotionally abused: Not on file     Physically abused: Not on file     Forced sexual activity: Not on file   Other Topics Concern    Not on file   Social History Narrative    Not on file       Family History   Problem Relation Age of Onset    Migraines Mother     Heart Disease Father     Heart Disease Maternal Grandmother     Diabetes Maternal Grandmother     Hypertension Maternal Grandmother     Migraines Maternal Grandmother     Heart Disease Maternal Grandfather     Diabetes Maternal Grandfather     Hypertension Maternal Grandfather     Migraines Maternal Grandfather     Stroke Maternal Grandfather        Allergies:  Patient has no known allergies. Home Medications:  Prior to Admission medications    Medication Sig Start Date End Date Taking?  Authorizing Provider   Erenumab-aooe (AIMOVIG) 70 MG/ML SOAJ Inject 70 mg into the skin every 30 days 20   JAMAR Brady CNP   topiramate (TOPAMAX) 100 MG tablet Take 1 tablet by mouth 2 times daily 20   JAMAR Brady CNP (BENADRYL) injection 25 mg    prochlorperazine (COMPAZINE) injection 10 mg    0.9 % sodium chloride bolus    0.9 % sodium chloride bolus    ketorolac (TORADOL) injection 30 mg           DIAGNOSTIC RESULTS / EMERGENCY DEPARTMENT COURSE / MDM     LABS:  Results for orders placed or performed during the hospital encounter of 09/09/20   CBC Auto Differential   Result Value Ref Range    WBC 8.9 3.5 - 11.0 k/uL    RBC 4.55 4.5 - 5.9 m/uL    Hemoglobin 14.9 13.5 - 17.5 g/dL    Hematocrit 43.9 41 - 53 %    MCV 96.4 80 - 100 fL    MCH 32.7 26 - 34 pg    MCHC 33.9 31 - 37 g/dL    RDW 13.2 11.5 - 14.9 %    Platelets 348 382 - 833 k/uL    MPV 8.4 6.0 - 12.0 fL    NRBC Automated NOT REPORTED per 100 WBC    Differential Type NOT REPORTED     Seg Neutrophils 59 36 - 66 %    Lymphocytes 27 24 - 44 %    Monocytes 7 1 - 7 %    Eosinophils % 6 (H) 0 - 4 %    Basophils 1 0 - 2 %    Immature Granulocytes NOT REPORTED 0 %    Segs Absolute 5.30 1.3 - 9.1 k/uL    Absolute Lymph # 2.40 1.0 - 4.8 k/uL    Absolute Mono # 0.60 0.1 - 1.3 k/uL    Absolute Eos # 0.50 (H) 0.0 - 0.4 k/uL    Basophils Absolute 0.10 0.0 - 0.2 k/uL    Absolute Immature Granulocyte NOT REPORTED 0.00 - 0.30 k/uL    WBC Morphology NOT REPORTED     RBC Morphology NOT REPORTED     Platelet Estimate NOT REPORTED    Basic Metabolic Panel   Result Value Ref Range    Glucose 103 (H) 70 - 99 mg/dL    BUN 14 6 - 20 mg/dL    CREATININE 0.89 0.70 - 1.20 mg/dL    Bun/Cre Ratio NOT REPORTED 9 - 20    Calcium 8.5 (L) 8.6 - 10.4 mg/dL    Sodium 139 135 - 144 mmol/L    Potassium 3.5 (L) 3.7 - 5.3 mmol/L    Chloride 109 (H) 98 - 107 mmol/L    CO2 19 (L) 20 - 31 mmol/L    Anion Gap 11 9 - 17 mmol/L    GFR Non-African American >60 >60 mL/min    GFR African American >60 >60 mL/min    GFR Comment          GFR Staging NOT REPORTED        RADIOLOGY:  None    EKG  None    All EKG's are interpreted by the Emergency Department Physician who either signs or Co-signs this chart in the absence

## 2020-09-09 NOTE — ED PROVIDER NOTES
EMERGENCY DEPARTMENT ENCOUNTER   ATTENDING ATTESTATION     Pt Name: Antonella Coffey  MRN: [id number]  [place] 1985  Date of evaluation: 9/9/20       Antonella Coffey is a 38 y.o. male who presents with Migraine      MDM: 48-year-old male presents with complaint of headache. Patient has a history of  shunt and hydrocephalus and has had worsening headache over the last week. On exam patient no acute distress no focal deficits, will obtain labs shunt series and CT head to evaluate for shunt malfunction. Labs reviewed and unremarkable, CT head was negative for acute process as well as shunt series was negative for acute process. Patient was reevaluated and states mild improvement of headache following medication. Patient was recommended for admission, patient declined at this time, and will follow-up with both his PCP and neurosurgeon. Patient/Guardian was informed of their diagnosis and told to follow up with PCP & neurosurgeon in 1-3 days. Patient demonstrates understanding and agreement with the plan. They were given the opportunity to ask questions and those questions were answered to the best of our ability with the available information. Patient/Guardian told to return to the ED for any new, worsening, changing or persistent symptoms. This dictation was prepared using [employer] voice recognition software. As a result, errors may have occurred. When identified, these errors have been corrected. While every attempt is made to correct errors in dictation, errors may still exist.       Vitals:   Vitals:    09/09/20 1717 09/09/20 1910   BP: 124/79 109/82   Pulse: 74 57   Resp: 16 16   Temp: 98.3 °F (36.8 °C)    SpO2: 98% 98%   Weight: 200 lb (90.7 kg)    Height: 6' 2\" (1.88 m)          I personally evaluated and examined the patient in conjunction with the resident and agree with the assessment, treatment plan, and disposition of the patient as recorded by the resident.     I performed a history

## 2020-12-04 NOTE — TELEPHONE ENCOUNTER
The insurance has denied the Perri Can saying that he has to try Botox for 3 months first. Please advise.

## 2020-12-07 NOTE — TELEPHONE ENCOUNTER
A call was placed to the patient. I get a message that the mail box has not been set up yet, asking that I call back later.

## 2020-12-07 NOTE — TELEPHONE ENCOUNTER
Rona Quesada returned my call. He is not really interested in getting Botox. He is using the [address] [place] [address] co-pay card through the company right now.

## 2021-01-28 ENCOUNTER — OFFICE VISIT (OUTPATIENT)
Dept: NEUROLOGY | Age: 36
End: 2021-01-28
Payer: COMMERCIAL

## 2021-01-28 VITALS
HEIGHT: 74 IN | HEART RATE: 72 BPM | SYSTOLIC BLOOD PRESSURE: 121 MMHG | BODY MASS INDEX: 26.56 KG/M2 | TEMPERATURE: 97.9 F | WEIGHT: 207 LBS | DIASTOLIC BLOOD PRESSURE: 77 MMHG

## 2021-01-28 DIAGNOSIS — G44.221 CHRONIC TENSION-TYPE HEADACHE, INTRACTABLE: ICD-10-CM

## 2021-01-28 DIAGNOSIS — G43.019 INTRACTABLE MIGRAINE WITHOUT AURA AND WITHOUT STATUS MIGRAINOSUS: Primary | ICD-10-CM

## 2021-01-28 PROCEDURE — 99214 OFFICE O/P EST MOD 30 MIN: CPT | Performed by: NURSE PRACTITIONER

## 2021-01-28 PROCEDURE — G8427 DOCREV CUR MEDS BY ELIG CLIN: HCPCS | Performed by: NURSE PRACTITIONER

## 2021-01-28 PROCEDURE — G8417 CALC BMI ABV UP PARAM F/U: HCPCS | Performed by: NURSE PRACTITIONER

## 2021-01-28 PROCEDURE — 1036F TOBACCO NON-USER: CPT | Performed by: NURSE PRACTITIONER

## 2021-01-28 PROCEDURE — G8484 FLU IMMUNIZE NO ADMIN: HCPCS | Performed by: NURSE PRACTITIONER

## 2021-01-28 RX ORDER — ERENUMAB-AOOE 140 MG/ML
140 INJECTION, SOLUTION SUBCUTANEOUS
Qty: 1 ML | Refills: 5 | Status: SHIPPED | OUTPATIENT
Start: 2021-01-28 | End: 2021-03-23

## 2021-01-28 RX ORDER — AMITRIPTYLINE HYDROCHLORIDE 10 MG/1
10 TABLET, FILM COATED ORAL NIGHTLY
Qty: 30 TABLET | Refills: 5 | Status: SHIPPED | OUTPATIENT
Start: 2021-01-28 | End: 2021-03-23 | Stop reason: ALTCHOICE

## 2021-01-28 NOTE — PROGRESS NOTES
[facility]            [address]          [facility], [address] [facility]          Dept: [phone]          Dept Fax: [phone]        MD Agnes Garnica MD Ahmed B. Terrall Gouty, MD Kathlyn Finner, MD Hezzie Sin, MD Elfego Meza, CNP            1/28/2021      HISTORY OF PRESENT ILLNESS:       I had the pleasure of seeing Halina Waggoner, who returns for continuing neurologic care. The patient was seen last on August 24, 2020 for treatment of chronic tension type headaches, intractable chronic migraine headaches, and intermittent diplopia secondary to hydrocephalus syndrome. 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 He had his first  shunt implanted in 2001 and had subsequent revisions in 2003, 2007, 2013, and 2014. For management of chronic intractable migraines he is currently prescribed topamax 150 mg twice daily as well as Aimovig 70 mg every 30 days as well as fioricet for abortive therapy. He is here today reporting that he did not notice any results from the [address]. He is currently getting a daily headache that is on the top of his head. His headache is described as pressure and tension that is present upon awaking. These headaches are different from his migraines. He reports getting 8 migraines a month that are lessened in intensity but not completely relieved with use of fioricet. Testing reviewed:    -MRI brain 5/22/20 no significant abnormalities demonstrated. Shunt tubing appears intact on shunt series. Cystic abnormality in the midline posterior to the third ventricle/midbrain with 2 posterior parietal shunt catheters with tips in the adjacent cyst.  Old right cerebellar infarct. No acute hemorrhage, midline shift, or hydrocephalus.   -MRI brain 8/8/19   Chloé Jerome is been interval placement of a second left-sided shunt catheter with significant interval decompression of the ventricular system, especially on the left.  The transependymal migration of CSF has lessened.  The frontal horns are nearly slit-like.  There is no evidence of new bleed,   pathologic contrast enhancement or pathologic extra-axial fluid collection to explain the patient's headaches.]               PAST MEDICAL HISTORY:         Diagnosis Date    Acute bronchitis due to infection 2016    Back pain     Diplopia     Eczema     Headache(784.0)     Hearing loss     Hydrocephalus with operating shunt ([facility] [address]         PAST SURGICAL HISTORY:         Procedure Laterality Date    CHOLECYSTECTOMY, LAPAROSCOPIC  2001    MASTOIDECTOMY Right 2001    VENTRICULOPERITONEAL SHUNT      VENTRICULOPERITONEAL SHUNT  2014    REVISION        SOCIAL HISTORY:     Social History     Socioeconomic History    Marital status:      Spouse name: Not on file    Number of children: Not on file    Years of education: Not on file    Highest education level: Not on file   Occupational History    Not on file   Social Needs    Financial resource strain: Not on file    Food insecurity     Worry: Not on file     Inability: Not on file    Transportation needs     Medical: Not on file     Non-medical: Not on file   Tobacco Use    Smoking status: Former Smoker     Packs/day: 0.50     Years: 20.00     Pack years: 10.00     Types: Cigarettes     Quit date: 9/3/2015     Years since quittin.4    Smokeless tobacco: Never Used   Substance and Sexual Activity    Alcohol use: No     Alcohol/week: 0.0 standard drinks    Drug use: No    Sexual activity: Yes     Partners: Female   Lifestyle    Physical activity     Days per week: Not on file     Minutes per session: Not on file    Stress: Not on file   Relationships    Social connections     Talks on phone: Not on file     Gets together: Not on file     Attends Yarsanism service: Not on file     Active member of club or organization: Not on file     Attends meetings of clubs or organizations: Not on file     Relationship status: Not on file    Intimate partner violence     Fear of current or ex partner: Not on file     Emotionally abused: Not on file     Physically abused: Not on file     Forced sexual activity: Not on file   Other Topics Concern    Not on file   Social History Narrative    Not on file       CURRENT MEDICATIONS:     Current Outpatient Medications   Medication Sig Dispense Refill    Erenumab-aooe (AIMOVIG) 140 MG/ML SOAJ Inject 140 mg into the skin every 30 days 1 mL 5    amitriptyline (ELAVIL) 10 MG tablet Take 1 tablet by mouth nightly 30 tablet 5    topiramate (TOPAMAX) 100 MG tablet Take 1 tablet by mouth 2 times daily 60 tablet 11    topiramate (TOPAMAX) 50 MG tablet Take 1 tablet by mouth 2 times daily In addition to the 100 mg tablet for a total of 125 mg twice daily (Patient taking differently: Take 50 mg by mouth 2 times daily In addition to the 100 mg tablet for a total of 300 mg daily) 60 tablet 11     No current facility-administered medications for this visit. ALLERGIES:   No Known Allergies                              REVIEW OF SYSTEMS        All items selected indicate a positive finding. Those items not selected are negative.   Constitutional [] Weight loss/gain   [] Fatigue  [] Fever/Chills   HEENT [] Hearing Loss  [] Visual Disturbance  [] Tinnitus  [] Eye pain   Respiratory [] Shortness of Breath  [] Cough  [] Snoring   Cardiovascular [] Chest Pain  [] Palpitations  [] Lightheaded   GI [] Constipation  [] Diarrhea  [] Swallowing change  [] Nausea/vomiting    [] Urinary Frequency  [] Urinary Urgency   Musculoskeletal [] Neck pain  [] Back pain  [] Muscle pain  [] Restless legs   Dermatologic [] Skin changes   Neurologic [] Memory loss/confusion  [] Seizures  [] Trouble walking or imbalance  [] Dizziness  [] Sleep disturbance  [] Weakness  [] Numbness  [] Tremors  [] Speech Difficulty  [] Headaches  [] Light Sensitivity  [] Sound Sensitivity   Endocrinology []Excessive thirst  []Excessive hunger   Psychiatric [] Anxiety/Depression  [] Hallucination   Allergy/immunology []Hives/environmental allergies   Hematologic/lymph [] Abnormal bleeding  [] Abnormal bruising         PHYSICAL EXAMINATION:       Vitals:    01/28/21 1533   BP: 121/77   Pulse: 72   Temp: 97.9 °F (36.6 °C)                                              .                                                                                                     General Appearance:  Alert, cooperative, no signs of distress, appears stated age   Head:  Normocephalic, no signs of trauma   Eyes:  Conjunctiva/corneas clear;  eyelids intact   Ears:  Normal external ear and canals   Nose: Nares normal, mucosa normal, no drainage    Throat: Lips and tongue normal; teeth normal;  gums normal   Neck: Supple, intact flexion, extension and rotation;   trachea midline;  no adenopathy;   thyroid: not enlarged;   no carotid pulse abnormality   Back:   Symmetric, no curvature, ROM adequate   Lungs:   Respirations unlabored   Heart:  Regular rate and rhythm           Extremities: Extremities normal, no cyanosis, no edema   Pulses: Symmetric over head and neck   Skin: Skin color, texture normal, no rashes, no lesions                                     NEUROLOGIC EXAMINATION    Neurologic Exam  Mental status    Alert and oriented x 3; intact memory with no confusion, speech or language problems; no hallucinations or delusions  Fund of information appropriate for level of education    Cranial nerves    II - visual fields intact to confrontation bilaterally  III, IV, VI - extra-ocular muscles full: no pupillary defect; no VERONICA, no nystagmus, no ptosis   V - normal facial sensation                                                               VII - normal facial symmetry VIII - intact hearing                                                                             IX, X - symmetrical palate                                                                  XI - symmetrical shoulder shrug                                                       XII - tongue midline without atrophy or fasciculation      Motor function  Normal muscle bulk and tone; strength 5/5 on all 4 extremities, no pronator drift      Sensory function Intact to light touch, pinprick, vibration, proprioception on all 4 extremities      Cerebellar Intact fine motor movement. No involuntary movements or tremors. No ataxia or dysmetria on finger to nose or heel to shin testing      Reflex function DTR 2+ on bilateral UE and LE, symmetric. Negative Babinski      Gait                   normal base and arm swing                  Medical Decision Making: In summary, your patient, Ted Self exhibits the following, with associated plan:    1. Chronic tension type headaches that is currently happening daily. 1. Add amitriptyline 10 mg at bedtime daily, patient is to begin taking on weekends as it has caused excessive sleepiness in the past  2. Intractable chronic migraine headaches getting 6 headache days per week with at least 3-4 being migraines. Previous medications include amitriptyline and Topamax. The patient was offered botox injections however he declined at this time. 1. Continue Topamax 150 mg twice daily  2. Increase Aimovig to 140 mg every 30 days  3. Add amitriptyline as above  4. He will contact the office soon if the new medications do not provide relief  5. Continue fioricet for abortive therapy  6. The patient will contact the office in 2 months if the Bunny Valencia is not effective in relieving his headaches  7. The patient will otherwise return for follow up in 6-8 weeks  8. Patient was given information on botox injections and instructed to read prior to next visit  3.  Intermittent diplopia secondary to hydrocephalus syndrome which is chronic                     Signed: Deidra Barnes CNP      *Please note that portions of this note were completed with a voice recognition program.  Although every effort was made to insure the accuracy of this automated transcription, some errors in transcription may have occurred, occasionally words and are mis-transcribed    Provider Attestation:    *The documentation recorded by the scribe accurately reflects the service I personally performed and the decisions made by myself. Portions of this exam were transcribed by a scribe. I personally performed the history, physical exam, and the medical decision-making and confirm the accuracy of the information in the transcribed note. *      Scribe Attestation:     By signing my name below, I, Roxie Monreal, attest that this documentation has been prepared under the direction and in the presence of Deidra Barnes CNP.

## 2021-02-05 ENCOUNTER — TELEPHONE (OUTPATIENT)
Dept: NEUROLOGY | Age: 36
End: 2021-02-05

## 2021-02-05 NOTE — TELEPHONE ENCOUNTER
Patient called in he has stopped Amitriptyline ,because when he takes it he can not get up in time for work. States he is so drowsy . I did the prior auth for the Aimovig (pending approval)  and he states he will continue on the topamax.

## 2021-02-08 ENCOUNTER — TELEPHONE (OUTPATIENT)
Dept: NEUROLOGY | Age: 36
End: 2021-02-08

## 2021-02-08 NOTE — TELEPHONE ENCOUNTER
Received a call from [facility] at [facility] to review clinical information for the botox PA. She said that the pharmacist will review and send back response.

## 2021-02-18 ENCOUNTER — TELEPHONE (OUTPATIENT)
Dept: NEUROLOGY | Age: 36
End: 2021-02-18

## 2021-02-18 ENCOUNTER — PROCEDURE VISIT (OUTPATIENT)
Dept: NEUROLOGY | Age: 36
End: 2021-02-18
Payer: COMMERCIAL

## 2021-02-18 DIAGNOSIS — G43.019 INTRACTABLE MIGRAINE WITHOUT AURA AND WITHOUT STATUS MIGRAINOSUS: Primary | ICD-10-CM

## 2021-02-18 PROCEDURE — 64615 CHEMODENERV MUSC MIGRAINE: CPT | Performed by: NURSE PRACTITIONER

## 2021-02-18 NOTE — PROGRESS NOTES
[facility]  Offices: [address], [place], [address] [facility]  [address] [facility], [address], [place], [address] [place] [address]  [address] [place] [address] [address]  Phone: [phone]  Fax: [phone]     MD Wendy Mendoza MD Ahmed B. Rosalyn Knack, MD Olympia Rolls, MD Enis Scriver, MD Alisa Holstein, CNP        Procedure: Chemodenervation CPT Code 03014  Indication for treatment: Chronic migraine (ICD 10 I63.592)  Consent form was signed. Potential risks and benefits for the procedure were explained to the patient. Procedure: 30-gauge half inch needle was used and 200 U vial Botox was prepared with 4ml 0.9% NS.155 units of Botox were used and 45 units of Botox were discarded. Botox was injected at 31 different sites as follows:   Order  Muscle  Units injected    A  Corrugator1  10 units at 2 div sites    B  Procerus  5 Units in 1 site    C  Frontalis¹  20 Units div. 4 sites    D  Temporalis¹  40 Units div 8 site    E  Occipitalis¹  30 Units div. 6 sites    F  Cervical paraspinal muscles¹  20 Units div 4 sites    G  Trapezius¹  30 Units div 6 sites    Total Dose  155 Units div 31 sites    2 Dose distributed bilaterally     Blood loss: Less than 1 cc     Complications: none     Disposition: Post-botox instructions were reviewed and provided to the patient. Patient was advised to seek medical care for any generalized muscle weakness, double vision, ptosis, trouble swallowing, difficulty talking or difficulty breathing. The patient will return in 3 months for subsequent botox treatments.

## 2021-02-19 RX ORDER — TOPIRAMATE 50 MG/1
TABLET, FILM COATED ORAL
Qty: 60 TABLET | Refills: 0 | Status: SHIPPED | OUTPATIENT
Start: 2021-02-19 | End: 2021-03-26

## 2021-02-19 NOTE — TELEPHONE ENCOUNTER
Pharmacy requesting refill of Topamax 50 mg.      Medication active on med list: yes      Date of last Rx: 2/24/20 for #60 - 11 refills  verified on 2/19/2021    verified by Kelly Matthews LPN      Date of last appointment 1/28/2021    Next Visit Date:  2/18/2021

## 2021-02-22 RX ORDER — TOPIRAMATE 100 MG/1
TABLET, FILM COATED ORAL
Qty: 60 TABLET | Refills: 0 | Status: SHIPPED | OUTPATIENT
Start: 2021-02-22 | End: 2021-04-06

## 2021-02-22 NOTE — TELEPHONE ENCOUNTER
Pharmacy requesting refill of Topamax 100 mg.       Medication active on med list yes      Date of last Rx: 2/24/2020  with 11 refills verified on 2/22/21   verified by DAVID REILLY      Date of last appointment 2/18/2020    Next Visit Date:  3/23/2021

## 2021-03-23 ENCOUNTER — OFFICE VISIT (OUTPATIENT)
Dept: NEUROLOGY | Age: 36
End: 2021-03-23
Payer: COMMERCIAL

## 2021-03-23 VITALS
TEMPERATURE: 97 F | DIASTOLIC BLOOD PRESSURE: 78 MMHG | BODY MASS INDEX: 29.13 KG/M2 | WEIGHT: 227 LBS | HEART RATE: 87 BPM | SYSTOLIC BLOOD PRESSURE: 126 MMHG | HEIGHT: 74 IN

## 2021-03-23 DIAGNOSIS — G91.8 CHRONIC BRAIN-HYDROCEPHALUS SYNDROME (HCC): Primary | ICD-10-CM

## 2021-03-23 DIAGNOSIS — G43.711 INTRACTABLE CHRONIC MIGRAINE WITHOUT AURA AND WITH STATUS MIGRAINOSUS: ICD-10-CM

## 2021-03-23 PROBLEM — G43.019 INTRACTABLE MIGRAINE WITHOUT AURA AND WITHOUT STATUS MIGRAINOSUS: Status: RESOLVED | Noted: 2020-08-24 | Resolved: 2021-03-23

## 2021-03-23 PROCEDURE — G8427 DOCREV CUR MEDS BY ELIG CLIN: HCPCS | Performed by: NURSE PRACTITIONER

## 2021-03-23 PROCEDURE — G8417 CALC BMI ABV UP PARAM F/U: HCPCS | Performed by: NURSE PRACTITIONER

## 2021-03-23 PROCEDURE — 99214 OFFICE O/P EST MOD 30 MIN: CPT | Performed by: NURSE PRACTITIONER

## 2021-03-23 PROCEDURE — G8484 FLU IMMUNIZE NO ADMIN: HCPCS | Performed by: NURSE PRACTITIONER

## 2021-03-23 PROCEDURE — 1036F TOBACCO NON-USER: CPT | Performed by: NURSE PRACTITIONER

## 2021-03-23 RX ORDER — RIZATRIPTAN BENZOATE 10 MG/1
10 TABLET ORAL
Qty: 12 TABLET | Refills: 5 | Status: SHIPPED | OUTPATIENT
Start: 2021-03-23 | End: 2022-03-01

## 2021-03-23 RX ORDER — NORTRIPTYLINE HYDROCHLORIDE 10 MG/1
10 CAPSULE ORAL NIGHTLY
Qty: 30 CAPSULE | Refills: 3 | Status: SHIPPED | OUTPATIENT
Start: 2021-03-23 | End: 2021-08-25 | Stop reason: SDUPTHER

## 2021-03-23 NOTE — PROGRESS NOTES
(ineffective). Because of excessive drowsiness, the patient called the office on February 5 stating that he stopped taking amitriptyline. At the last visit, the patient was getting approximately 3-4 migraines per week. Today, the patient reports that he tolerated the Botox without side effects, but saw no improvement in his headaches. He states that the intensity of his daily headaches are 4-7/10. He will typically take Advil liquid gels to alleviate his headaches. He does have headache free days infrequently. He describes his headaches as pressure headaches that are accompanied by photophobia and phonophobia. Testing reviewed:    -MRI brain 5/22/20 no significant abnormalities demonstrated.  Shunt tubing appears intact on shunt series.  Cystic abnormality in the midline posterior to the third ventricle/midbrain with 2 posterior parietal shunt catheters with tips in the adjacent cyst.  Old right cerebellar infarct.  No acute hemorrhage, midline shift, or hydrocephalus.   -MRI brain 8/8/19   Rosanna Reddish is been interval placement of a second left-sided shunt catheter with significant interval decompression of the ventricular system, especially on the left.  The transependymal migration of CSF has lessened.  The frontal horns are nearly slit-like.  There is no evidence of new bleed,   pathologic contrast enhancement or pathologic extra-axial fluid collection to explain the patient's headaches.]           PAST MEDICAL HISTORY:         Diagnosis Date    Acute bronchitis due to infection 12/5/2016    Back pain     Diplopia     Eczema     Headache(784.0)     Hearing loss     Hydrocephalus with operating shunt [facility])         PAST SURGICAL HISTORY:         Procedure Laterality Date    CHOLECYSTECTOMY, LAPAROSCOPIC  2001    MASTOIDECTOMY Right 2001    VENTRICULOPERITONEAL SHUNT      VENTRICULOPERITONEAL SHUNT  06/23/2014    REVISION        SOCIAL HISTORY:     Social History     Socioeconomic History    Marital with 100 mg tablet for a total of 150 mg BID 60 tablet 0    Erenumab-aooe (AIMOVIG) 140 MG/ML SOAJ Inject 140 mg into the skin every 30 days (Patient not taking: Reported on 3/23/2021) 1 mL 5    amitriptyline (ELAVIL) 10 MG tablet Take 1 tablet by mouth nightly (Patient not taking: Reported on 3/23/2021) 30 tablet 5     No current facility-administered medications for this visit. ALLERGIES:   No Known Allergies                              REVIEW OF SYSTEMS        All items selected indicate a positive finding. Those items not selected are negative. Constitutional [] Weight loss/gain   [] Fatigue  [] Fever/Chills   HEENT [] Hearing Loss  [] Visual Disturbance  [] Tinnitus  [] Eye pain   Respiratory [] Shortness of Breath  [] Cough  [] Snoring   Cardiovascular [] Chest Pain  [] Palpitations  [] Lightheaded   GI [] Constipation  [] Diarrhea  [] Swallowing change  [] Nausea/vomiting    [] Urinary Frequency  [] Urinary Urgency   Musculoskeletal [] Neck pain  [] Back pain  [] Muscle pain  [] Restless legs   Dermatologic [] Skin changes   Neurologic [] Memory loss/confusion  [] Seizures  [] Trouble walking or imbalance  [] Dizziness  [] Sleep disturbance  [] Weakness  [] Numbness  [] Tremors  [] Speech Difficulty  [x] Headaches  [x] Light Sensitivity  [x] Sound Sensitivity   Endocrinology []Excessive thirst  []Excessive hunger   Psychiatric [] Anxiety/Depression  [] Hallucination   Allergy/immunology []Hives/environmental allergies   Hematologic/lymph [] Abnormal bleeding  [] Abnormal bruising         PHYSICAL EXAMINATION:       Vitals:    03/23/21 0720   BP: 126/78   Pulse: 87   Temp: 97 °F (36.1 °C)                                              .                                                                                                     General Appearance:  Alert, cooperative, no signs of distress, appears stated age   Head:  Normocephalic, no signs of trauma   Eyes:  Conjunctiva/corneas clear; eyelids intact   Ears:  Normal external ear and canals   Nose: Nares normal, mucosa normal, no drainage    Throat: Lips and tongue normal; teeth normal;  gums normal   Neck: Supple, intact flexion, extension and rotation;   trachea midline;  no adenopathy;   thyroid: not enlarged;   no carotid pulse abnormality   Back:   Symmetric, no curvature, ROM adequate   Lungs:   Respirations unlabored   Heart:  Regular rate and rhythm           Extremities: Extremities normal, no cyanosis, no edema   Pulses: Symmetric over head and neck   Skin: Skin color, texture normal, no rashes, no lesions                                     NEUROLOGIC EXAMINATION    Neurologic Exam  Mental status    Alert and oriented x 3; intact memory with no confusion, speech or language problems; no hallucinations or delusions  Fund of information appropriate for level of education    Cranial nerves    II - visual fields intact to confrontation bilaterally  III, IV, VI - extra-ocular muscles full: no pupillary defect; no VERONICA, no nystagmus, no ptosis   V - normal facial sensation                                                               VII - normal facial symmetry                                                             VIII - intact hearing                                                                             IX, X - symmetrical palate                                                                  XI - symmetrical shoulder shrug                                                       XII - tongue midline without atrophy or fasciculation      Motor function  Normal muscle bulk and tone; strength 5/5 on all 4 extremities, no pronator drift      Sensory function Intact to light touch, pinprick, vibration, proprioception on all 4 extremities      Cerebellar Intact fine motor movement. No involuntary movements or tremors.  No ataxia or dysmetria on finger to nose or heel to shin testing      Reflex function DTR 2+ on bilateral UE and LE, symmetric. Negative Babinski      Gait                   normal base and arm swing                  Medical Decision Making: In summary, your patient, Claudetta Sanger exhibits the following, with associated plan:    1. Chronic tension type headaches that is currently happening daily. 1. Stop amitriptyline due to side effect of fatigue  2. Intractable chronic migraine headaches getting 6 headache days per week with at least 3-4 being migraines.  Previous medications include amitriptyline, Aimovig , Botox, Topamax, Imitrex, Fioricet, and Botox injections. 1. Continue Topamax 150 mg twice daily  2. Add Maxalt 10 mg for abortive therapy  3. Add nortriptyline 10 mg daily at bedtime   4. He will contact the office soon if the new medications do not provide relief  5. The patient will receive second set of Botox injections in May   3. Intermittent diplopia secondary to hydrocephalus syndrome which is chronic            Signed: Hakan Ashley CNP      *Please note that portions of this note were completed with a voice recognition program.  Although every effort was made to insure the accuracy of this automated transcription, some errors in transcription may have occurred, occasionally words and are mis-transcribed    Provider Attestation: The documentation recorded by the scribe accurately reflects the service I personally performed and the decisions made by myself. Portions of this note were transcribed by a scribe. I personally performed the history, physical exam, and medical decision-making and confirm the accuracy of the information in the transcribed note. Scribe Attestation:   By signing my name below, Shanae Handy, attest that this documentation has been prepared under the direction and in the presence of Hakan Ashley CNP.

## 2021-03-26 NOTE — TELEPHONE ENCOUNTER
Pharmacy requesting refill of Topiramate 50 mg.      Medication active on med list yes      Date of last Rx: 2/19/21 with 0 refills verified on 3/26/21   verified by DAVID REILLY      Date of last appointment 3/23/21    Next Visit Date:  5/24/2021

## 2021-03-29 RX ORDER — TOPIRAMATE 50 MG/1
TABLET, FILM COATED ORAL
Qty: 60 TABLET | Refills: 1 | Status: SHIPPED | OUTPATIENT
Start: 2021-03-29 | End: 2021-06-04 | Stop reason: SDUPTHER

## 2021-04-06 RX ORDER — TOPIRAMATE 100 MG/1
TABLET, FILM COATED ORAL
Qty: 60 TABLET | Refills: 2 | Status: SHIPPED | OUTPATIENT
Start: 2021-04-06 | End: 2021-07-12

## 2021-04-06 NOTE — TELEPHONE ENCOUNTER
Pharmacy requesting refill of Topiramate.       Medication active on med list yes      Date of last fill: 2/22/21  with 0 refills verified on 4/6/21  verified by PURA MAGAÑA      Date of last appointment 3/23/21    Next Visit Date:  5/24/2021

## 2021-05-24 ENCOUNTER — PROCEDURE VISIT (OUTPATIENT)
Dept: NEUROLOGY | Age: 36
End: 2021-05-24
Payer: COMMERCIAL

## 2021-05-24 DIAGNOSIS — G43.711 INTRACTABLE CHRONIC MIGRAINE WITHOUT AURA AND WITH STATUS MIGRAINOSUS: Primary | ICD-10-CM

## 2021-05-24 PROCEDURE — 64615 CHEMODENERV MUSC MIGRAINE: CPT | Performed by: NURSE PRACTITIONER

## 2021-05-24 NOTE — PROGRESS NOTES
[facility]  Offices: [address], [facility], [address] [facility]  [address], [address], [place], [address] [place] [address] [address]  Phone: [phone]  Fax: [phone]     E. Tandy Garrison, MD Carylon Haff, MD Ahmed B. Dois Manges, MD Coletta Bulls, MD Amedeo Pita, MD Phineas Bernheim, CNP        Procedure: Chemodenervation CPT Code 61674  Indication for treatment: Chronic migraine (ICD 10 C47.045)  Consent form was signed. Potential risks and benefits for the procedure were explained to the patient. Procedure: 30-gauge half inch needle was used and 200 U vial Botox was prepared with 4ml 0.9% NS.155 units of Botox were used and 45 units of Botox were discarded. Botox was injected at 31 different sites as follows:   Order  Muscle  Units injected    A  Corrugator1  10 units at 2 div sites    B  Procerus  5 Units in 1 site    C  Frontalis¹  20 Units div. 4 sites    D  Temporalis¹  40 Units div 8 site    E  Occipitalis¹  30 Units div. 6 sites    F  Cervical paraspinal muscles¹  20 Units div 4 sites    G  Trapezius¹  30 Units div 6 sites    Total Dose  155 Units div 31 sites    2 Dose distributed bilaterally     Blood loss: Less than 1 cc     Complications: none     Disposition: Post-botox instructions were reviewed and provided to the patient. Patient was advised to seek medical care for any generalized muscle weakness, double vision, ptosis, trouble swallowing, difficulty talking or difficulty breathing. The patient will return in 3 months for subsequent botox treatments.

## 2021-06-04 NOTE — TELEPHONE ENCOUNTER
Pharmacy requesting refill of topiramate.       Medication active on med list yes      Date of last fill: 3/29/21  with 1 refills verified on 6/4/21  verified by PURA MAGAÑA      Date of last appointment 3/23/21    Next Visit Date:  8/25/2021

## 2021-06-05 RX ORDER — TOPIRAMATE 50 MG/1
TABLET, FILM COATED ORAL
Qty: 60 TABLET | Refills: 2 | Status: SHIPPED | OUTPATIENT
Start: 2021-06-05 | End: 2021-08-25 | Stop reason: SDUPTHER

## 2021-07-12 RX ORDER — TOPIRAMATE 100 MG/1
TABLET, FILM COATED ORAL
Qty: 60 TABLET | Refills: 1 | Status: SHIPPED | OUTPATIENT
Start: 2021-07-12 | End: 2021-08-25 | Stop reason: SDUPTHER

## 2021-07-12 NOTE — TELEPHONE ENCOUNTER
Pharmacy requesting refill of Topamax 100 mg.       Medication active on med list yes      Date of last Rx: 4/6/21  with 2 refills verified on 7/12/21   verified by DAVID REILLY      Date of last appointment 5/24/21    Next Visit Date:  8/25/2021

## 2021-08-25 ENCOUNTER — TELEPHONE (OUTPATIENT)
Dept: NEUROLOGY | Age: 36
End: 2021-08-25

## 2021-08-25 ENCOUNTER — OFFICE VISIT (OUTPATIENT)
Dept: NEUROLOGY | Age: 36
End: 2021-08-25
Payer: MEDICAID

## 2021-08-25 DIAGNOSIS — G43.711 INTRACTABLE CHRONIC MIGRAINE WITHOUT AURA AND WITH STATUS MIGRAINOSUS: Primary | ICD-10-CM

## 2021-08-25 PROCEDURE — 64615 CHEMODENERV MUSC MIGRAINE: CPT | Performed by: NURSE PRACTITIONER

## 2021-08-25 RX ORDER — TOPIRAMATE 50 MG/1
TABLET, FILM COATED ORAL
Qty: 60 TABLET | Refills: 5 | Status: SHIPPED | OUTPATIENT
Start: 2021-08-25 | End: 2022-03-01 | Stop reason: SDUPTHER

## 2021-08-25 RX ORDER — TOPIRAMATE 100 MG/1
TABLET, FILM COATED ORAL
Qty: 60 TABLET | Refills: 5 | Status: SHIPPED | OUTPATIENT
Start: 2021-08-25 | End: 2022-03-01 | Stop reason: SDUPTHER

## 2021-08-25 RX ORDER — NORTRIPTYLINE HYDROCHLORIDE 10 MG/1
10 CAPSULE ORAL NIGHTLY
Qty: 30 CAPSULE | Refills: 5 | Status: SHIPPED | OUTPATIENT
Start: 2021-08-25 | End: 2022-03-01 | Stop reason: ALTCHOICE

## 2021-08-25 NOTE — PROGRESS NOTES
[facility]  Offices: [address], [place], [address] [facility]  [address] [facility], [address], [place], [address] [place] [address]  [address] [place] [address] [address]  Phone: [phone]  Fax: [phone]     MD Macy Richard, MD Lenin Kearns, MD Baljinder Reeder, MD Vidhya Smith, CNP        Procedure: Chemodenervation CPT Code 81078  Indication for treatment: Chronic migraine (ICD 10 D70.807)  Consent form was signed. Potential risks and benefits for the procedure were explained to the patient. Procedure: 30-gauge half inch needle was used and 200 U vial Botox was prepared with 4ml 0.9% NS.155 units of Botox were used and 45 units of Botox were discarded. Botox was injected at 31 different sites as follows:   Order  Muscle  Units injected    A  Corrugator1  10 units at 2 div sites    B  Procerus  5 Units in 1 site    C  Frontalis¹  20 Units div. 4 sites    D  Temporalis¹  40 Units div 8 site    E  Occipitalis¹  30 Units div. 6 sites    F  Cervical paraspinal muscles¹  20 Units div 4 sites    G  Trapezius¹  30 Units div 6 sites    Total Dose  155 Units div 31 sites    2 Dose distributed bilaterally     Blood loss: Less than 1 cc     Complications: none     Disposition: Post-botox instructions were reviewed and provided to the patient. Patient was advised to seek medical care for any generalized muscle weakness, double vision, ptosis, trouble swallowing, difficulty talking or difficulty breathing. The patient will return in 3 months for subsequent botox treatments.

## 2021-08-25 NOTE — PROGRESS NOTES
Not on file     Social Determinants of Health     Financial Resource Strain:     Difficulty of Paying Living Expenses:    Food Insecurity:     Worried About Running Out of Food in the Last Year:     [address] in the Last Year:    Transportation Needs:     Lack of Transportation (Medical):  Lack of Transportation (Non-Medical):    Physical Activity:     Days of Exercise per Week:     Minutes of Exercise per Session:    Stress:     Feeling of Stress :    Social Connections:     Frequency of Communication with Friends and Family:     Frequency of Social Gatherings with Friends and Family:     Attends Spiritism Services:     Active Member of Clubs or Organizations:     Attends Club or Organization Meetings:     Marital Status:    Intimate Partner Violence:     Fear of Current or Ex-Partner:     Emotionally Abused:     Physically Abused:     Sexually Abused:        CURRENT MEDICATIONS:     Current Outpatient Medications   Medication Sig Dispense Refill    topiramate (TOPAMAX) 100 MG tablet TAKE 1 TABLET BY MOUTH TWO TIMES A DAY  60 tablet 1    topiramate (TOPAMAX) 50 MG tablet TAKE 1 TABLET BY MOUTH TWO TIMES A DAY, along with 100 mg tablets for a total of 150 mg 2 times daily 60 tablet 2    nortriptyline (PAMELOR) 10 MG capsule Take 1 capsule by mouth nightly 30 capsule 3    rizatriptan (MAXALT) 10 MG tablet Take 1 tablet by mouth once as needed for Migraine May repeat in 2 hours if needed 12 tablet 5     No current facility-administered medications for this visit. ALLERGIES:   No Known Allergies                              REVIEW OF SYSTEMS        All items selected indicate a positive finding. Those items not selected are negative.   Constitutional [] Weight loss/gain   [] Fatigue  [] Fever/Chills   HEENT [] Hearing Loss  [] Visual Disturbance  [] Tinnitus  [] Eye pain   Respiratory [] Shortness of Breath  [] Cough  [] Snoring   Cardiovascular [] Chest Pain  [] Palpitations  [] Lightheaded   GI [] Constipation  [] Diarrhea  [] Swallowing change  [] Nausea/vomiting    [] Urinary Frequency  [] Urinary Urgency   Musculoskeletal [] Neck pain  [] Back pain  [] Muscle pain  [] Restless legs   Dermatologic [] Skin changes   Neurologic [] Memory loss/confusion  [] Seizures  [] Trouble walking or imbalance  [] Dizziness  [] Sleep disturbance  [] Weakness  [] Numbness  [] Tremors  [] Speech Difficulty  [] Headaches  [] Light Sensitivity  [] Sound Sensitivity   Endocrinology []Excessive thirst  []Excessive hunger   Psychiatric [] Anxiety/Depression  [] Hallucination   Allergy/immunology []Hives/environmental allergies   Hematologic/lymph [] Abnormal bleeding  [] Abnormal bruising         PHYSICAL EXAMINATION:       There were no vitals filed for this visit.                                            .                                                                                                    General Appearance:  Alert, cooperative, no signs of distress, appears stated age   Head:  Normocephalic, no signs of trauma   Eyes:  Conjunctiva/corneas clear;  eyelids intact   Ears:  Normal external ear and canals   Nose: Nares normal, mucosa normal, no drainage    Throat: Lips and tongue normal; teeth normal;  gums normal   Neck: Supple, intact flexion, extension and rotation;   trachea midline;  no adenopathy;   thyroid: not enlarged;   no carotid pulse abnormality   Back:   Symmetric, no curvature, ROM adequate   Lungs:   Respirations unlabored   Heart:  Regular rate and rhythm           Extremities: Extremities normal, no cyanosis, no edema   Pulses: Symmetric over head and neck   Skin: Skin color, texture normal, no rashes, no lesions                                     NEUROLOGIC EXAMINATION    Neurologic Exam  Mental status    Alert and oriented x 3; intact memory with no confusion, speech or language problems; no hallucinations or delusions  Fund of information appropriate for level of education    Cranial nerves    II - visual fields intact to confrontation bilaterally  III, IV, VI  extra-ocular muscles full: no pupillary defect; no VERONICA, no nystagmus, no ptosis   V - normal facial sensation                                                               VII - normal facial symmetry                                                             VIII - intact hearing                                                                             IX, X - symmetrical palate                                                                  XI - symmetrical shoulder shrug                                                       XII - tongue midline without atrophy or fasciculation      Motor function  Normal muscle bulk and tone; strength 5/5 on all 4 extremities, no pronator drift      Sensory function Intact to light touch, pinprick, vibration, proprioception on all 4 extremities      Cerebellar Intact fine motor movement. No involuntary movements or tremors. No ataxia or dysmetria on finger to nose or heel to shin testing      Reflex function DTR 2+ on bilateral UE and LE, symmetric. Negative Babinski      Gait                   normal base and arm swing                  Medical Decision Making: In summary, your patient, Rosalva Bonner exhibits the following, with associated plan:    1. ***            Signed: Fabian Villa CNP      *Please note that portions of this note were completed with a voice recognition program.  Although every effort was made to insure the accuracy of this automated transcription, some errors in transcription may have occurred, occasionally words and are mis-transcribed    Provider Attestation: The documentation recorded by the scribe accurately reflects the service I personally performed and the decisions made by myself. Portions of this note were transcribed by a scribe.  I personally performed the history, physical exam, and medical decision-making and confirm the accuracy of

## 2021-11-29 ENCOUNTER — OFFICE VISIT (OUTPATIENT)
Dept: NEUROLOGY | Age: 36
End: 2021-11-29
Payer: MEDICAID

## 2021-11-29 VITALS
SYSTOLIC BLOOD PRESSURE: 113 MMHG | WEIGHT: 236 LBS | HEIGHT: 74 IN | BODY MASS INDEX: 30.29 KG/M2 | DIASTOLIC BLOOD PRESSURE: 75 MMHG | HEART RATE: 79 BPM

## 2021-11-29 DIAGNOSIS — G43.711 INTRACTABLE CHRONIC MIGRAINE WITHOUT AURA AND WITH STATUS MIGRAINOSUS: Primary | ICD-10-CM

## 2021-11-29 DIAGNOSIS — G91.8 CHRONIC BRAIN-HYDROCEPHALUS SYNDROME (HCC): ICD-10-CM

## 2021-11-29 PROCEDURE — 99214 OFFICE O/P EST MOD 30 MIN: CPT | Performed by: NURSE PRACTITIONER

## 2021-11-29 PROCEDURE — 64615 CHEMODENERV MUSC MIGRAINE: CPT | Performed by: NURSE PRACTITIONER

## 2021-11-29 NOTE — PROGRESS NOTES
[facility]  Offices: [address], [place], 309 [facility]  [address] [facility], [address], [place], [address] [place], [place] [address], [address]  Phone: [phone]  Fax: [phone]     ELLIOTT Man President, MD Edvin Thacker MD Morris Jericho, MD Viviann Caper, MD Wilhelminia Haw, CNP        Procedure: Chemodenervation CPT Code 39767  Indication for treatment: Chronic migraine (ICD 10 I11.214)  Consent form was signed. Potential risks and benefits for the procedure were explained to the patient. Procedure: 30-gauge half inch needle was used and 200 U vial Botox was prepared with 4ml 0.9% NS.155 units of Botox were used and 45 units of Botox were discarded. Botox was injected at 31 different sites as follows:   Order  Muscle  Units injected    A  Corrugator1  10 units at 2 div sites    B  Procerus  5 Units in 1 site    C  Frontalis¹  20 Units div. 4 sites    D  Temporalis¹  40 Units div 8 site    E  Occipitalis¹  30 Units div. 6 sites    F  Cervical paraspinal muscles¹  20 Units div 4 sites    G  Trapezius¹  30 Units div 6 sites    Total Dose  155 Units div 31 sites    2 Dose distributed bilaterally     Blood loss: Less than 1 cc     Complications: none     Disposition: Post-botox instructions were reviewed and provided to the patient. Patient was advised to seek medical care for any generalized muscle weakness, double vision, ptosis, trouble swallowing, difficulty talking or difficulty breathing. The patient will return in 3 months for subsequent botox treatments.

## 2021-11-29 NOTE — PROGRESS NOTES
[facility]            [address]          [facility], [address] [facility]          Dept: [phone]          Dept Fax: [phone]    MD Kadi Zamora MD Ahmed B. Donnice Gate, MD Richardine Keels, MD Martina Redbird, CNP            11/29/2021      HISTORY OF PRESENT ILLNESS:       I had the pleasure of seeing Vicki Lane, who returns for continuing neurologic care. 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 For prophylaxis of his tension headaches he was prescribed nortriptyline 10 mg at bedtime daily however he does not remember ever taking it. For prophylaxis of his migraine headaches the patient is prescribed topamax 150 mg twice daily and receives botox injections every 90 days. For abortive therapy he is prescribed maxalt 10 mg. The patient received botox injections in May 2021 and August 2021. He is here today reporting that he has noticed a decrease in his migraine frequency and intensity after getting botox injections. He is currently getting 3-4 headaches/week that are a 3/10 in intensity. He typically aborts his headaches with the use of tylenol and will use maxalt for a more severe headache with success. Headache location: holoacranial   Headache quality: pressure, pounding  Associated factors: photophobia, phonophobia  Intensity: 3/10  Headache chronicity: several years  Headache frequency: 3-4/week  Aggravating factors : bright lights, loud sounds   Relieving factors: tylenol, maxalt  Prophylactic medications: topamax, botox  Abortive medications: maxalt, tylenol  Previously used medications: amitriptyline, aimovig, imitrex, fioricet    The patient also has intermittent diplopia secondary to chronic hydrocephalus syndrome.          Testing reviewed:      -MRI brain 5/22/20 no significant abnormalities demonstrated.  Shunt tubing appears intact on shunt series.  Cystic abnormality in the midline posterior to the third ventricle/midbrain with 2 posterior parietal shunt catheters with tips in the adjacent cyst.  Old right cerebellar infarct.  No acute hemorrhage, midline shift, or hydrocephalus.   -MRI brain 19   Rose Heimlich is been interval placement of a second left-sided shunt catheter with significant interval decompression of the ventricular system, especially on the left.  The transependymal migration of CSF has lessened.  The frontal horns are nearly slit-like.  There is no evidence of new bleed,   pathologic contrast enhancement or pathologic extra-axial fluid collection to explain the patient's headaches.]           PAST MEDICAL HISTORY:         Diagnosis Date    Acute bronchitis due to infection 2016    Back pain     Diplopia     Eczema     Headache(784.0)     Hearing loss     Hydrocephalus with operating shunt ([address]         PAST SURGICAL HISTORY:         Procedure Laterality Date    CHOLECYSTECTOMY, LAPAROSCOPIC  2001    MASTOIDECTOMY Right 2001    VENTRICULOPERITONEAL SHUNT      VENTRICULOPERITONEAL SHUNT  2014    REVISION        SOCIAL HISTORY:     Social History     Socioeconomic History    Marital status:      Spouse name: Not on file    Number of children: Not on file    Years of education: Not on file    Highest education level: Not on file   Occupational History    Not on file   Tobacco Use    Smoking status: Former Smoker     Packs/day: 0.50     Years: 20.00     Pack years: 10.00     Types: Cigarettes     Quit date: 9/3/2015     Years since quittin.2    Smokeless tobacco: Never Used   Vaping Use    Vaping Use: Never used   Substance and Sexual Activity    Alcohol use: No     Alcohol/week: 0.0 standard drinks    Drug use: No    Sexual activity: Yes     Partners: Female   Other Topics Concern    Not on file   Social History Narrative    Not on file     Social Determinants of Health     Financial Resource Strain:     Difficulty of Paying Living Expenses: Not on file   Food Insecurity:     Worried About Running Out of Food in the Last Year: Not on file    [place] of Food in the Last Year: Not on file   Transportation Needs:     Lack of Transportation (Medical): Not on file    Lack of Transportation (Non-Medical):  Not on file   Physical Activity:     Days of Exercise per Week: Not on file    Minutes of Exercise per Session: Not on file   Stress:     Feeling of Stress : Not on file   Social Connections:     Frequency of Communication with Friends and Family: Not on file    Frequency of Social Gatherings with Friends and Family: Not on file    Attends Congregation Services: Not on file    Active Member of [address] [place] [address] [employer] or Organizations: Not on file    Attends Club or Organization Meetings: Not on file    Marital Status: Not on file   Intimate Partner Violence:     Fear of Current or Ex-Partner: Not on file    Emotionally Abused: Not on file    Physically Abused: Not on file    Sexually Abused: Not on file   Housing Stability:     Unable to Pay for Housing in the Last Year: Not on file    Number of [place] in the Last Year: Not on file    Unstable Housing in the Last Year: Not on file       CURRENT MEDICATIONS:     Current Outpatient Medications   Medication Sig Dispense Refill    topiramate (TOPAMAX) 50 MG tablet TAKE 1 TABLET BY MOUTH TWO TIMES A DAY, along with 100 mg tablets for a total of 150 mg 2 times daily 60 tablet 5    topiramate (TOPAMAX) 100 MG tablet TAKE 1 TABLET BY MOUTH TWO TIMES A DAY 60 tablet 5    nortriptyline (PAMELOR) 10 MG capsule Take 1 capsule by mouth nightly (Patient not taking: Reported on 11/29/2021) 30 capsule 5    rizatriptan (MAXALT) 10 MG tablet Take 1 tablet by mouth once as needed for Migraine May repeat in 2 hours if needed (Patient not taking: Reported on 11/29/2021) 12 tablet 5     No current facility-administered medications for this visit. ALLERGIES:   No Known Allergies                              REVIEW OF SYSTEMS        All items selected indicate a positive finding. Those items not selected are negative.   Constitutional [] Weight loss/gain   [] Fatigue  [] Fever/Chills   HEENT [] Hearing Loss  [] Visual Disturbance  [] Tinnitus  [] Eye pain   Respiratory [] Shortness of Breath  [] Cough  [] Snoring   Cardiovascular [] Chest Pain  [] Palpitations  [] Lightheaded   GI [] Constipation  [] Diarrhea  [] Swallowing change  [] Nausea/vomiting    [] Urinary Frequency  [] Urinary Urgency   Musculoskeletal [] Neck pain  [] Back pain  [] Muscle pain  [] Restless legs   Dermatologic [] Skin changes   Neurologic [] Memory loss/confusion  [] Seizures  [] Trouble walking or imbalance  [] Dizziness  [] Sleep disturbance  [] Weakness  [] Numbness  [] Tremors  [] Speech Difficulty  [x] Headaches  [x] Light Sensitivity  [x] Sound Sensitivity   Endocrinology []Excessive thirst  []Excessive hunger   Psychiatric [] Anxiety/Depression  [] Hallucination   Allergy/immunology []Hives/environmental allergies   Hematologic/lymph [] Abnormal bleeding  [] Abnormal bruising         PHYSICAL EXAMINATION:       Vitals:    11/29/21 1050   BP: 113/75   Pulse: 79                                              .                                                                                                    General Appearance:  Alert, cooperative, no signs of distress, appears stated age   Head:  Normocephalic, no signs of trauma   Eyes:  Conjunctiva/corneas clear;  eyelids intact   Ears:  Normal external ear and canals   Nose: Nares normal, mucosa normal, no drainage    Throat: Lips and tongue normal; teeth normal;  gums normal   Neck: Supple, intact flexion, extension and rotation;   trachea midline;  no adenopathy;   thyroid: not enlarged;   no carotid pulse abnormality   Back:   Symmetric, no curvature, ROM adequate   Lungs:   Respirations unlabored   Heart:  Regular rate and rhythm           Extremities: Extremities normal, no cyanosis, no edema   Pulses: Symmetric over head and neck   Skin: Skin color, texture normal, no rashes, no lesions                                     NEUROLOGIC EXAMINATION    Neurologic Exam  Mental status    Alert and oriented x 3; intact memory with no confusion, speech or language problems; no hallucinations or delusions  Fund of information appropriate for level of education    Cranial nerves    II - visual fields intact to confrontation bilaterally  III, IV, VI - extra-ocular muscles full: no pupillary defect; no VERONICA, no nystagmus, no ptosis   V - normal facial sensation                                                               VII - normal facial symmetry                                                             VIII - intact hearing                                                                             IX, X - symmetrical palate                                                                  XI - symmetrical shoulder shrug                                                       XII - tongue midline without atrophy or fasciculation      Motor function  Normal muscle bulk and tone; strength 5/5 on all 4 extremities, no pronator drift      Sensory function Intact to light touch, pinprick, vibration, proprioception on all 4 extremities      Cerebellar Intact fine motor movement. No involuntary movements or tremors. No ataxia or dysmetria on finger to nose or heel to shin testing      Reflex function DTR 2+ on bilateral UE and LE, symmetric. Down going toes bilaterally      Gait                   normal base and arm swing                  Medical Decision Making: In summary, your patient, Jasmin Poon exhibits the following, with associated plan:    1. Chronic tension type headaches   1.  The patient was prescribed nortriptyline however he does not remember ever taking this

## 2022-03-01 ENCOUNTER — OFFICE VISIT (OUTPATIENT)
Dept: NEUROLOGY | Age: 37
End: 2022-03-01
Payer: MEDICAID

## 2022-03-01 VITALS
TEMPERATURE: 97.7 F | HEIGHT: 74 IN | SYSTOLIC BLOOD PRESSURE: 113 MMHG | BODY MASS INDEX: 30.67 KG/M2 | WEIGHT: 239 LBS | HEART RATE: 73 BPM | DIASTOLIC BLOOD PRESSURE: 72 MMHG

## 2022-03-01 DIAGNOSIS — G91.9 HYDROCEPHALUS WITH OPERATING SHUNT (HCC): ICD-10-CM

## 2022-03-01 DIAGNOSIS — G91.8 CHRONIC BRAIN-HYDROCEPHALUS SYNDROME (HCC): ICD-10-CM

## 2022-03-01 DIAGNOSIS — G43.711 INTRACTABLE CHRONIC MIGRAINE WITHOUT AURA AND WITH STATUS MIGRAINOSUS: Primary | ICD-10-CM

## 2022-03-01 DIAGNOSIS — G44.221 CHRONIC TENSION-TYPE HEADACHE, INTRACTABLE: ICD-10-CM

## 2022-03-01 PROCEDURE — G8484 FLU IMMUNIZE NO ADMIN: HCPCS | Performed by: NURSE PRACTITIONER

## 2022-03-01 PROCEDURE — 99214 OFFICE O/P EST MOD 30 MIN: CPT | Performed by: NURSE PRACTITIONER

## 2022-03-01 PROCEDURE — G8427 DOCREV CUR MEDS BY ELIG CLIN: HCPCS | Performed by: NURSE PRACTITIONER

## 2022-03-01 PROCEDURE — G8417 CALC BMI ABV UP PARAM F/U: HCPCS | Performed by: NURSE PRACTITIONER

## 2022-03-01 PROCEDURE — 1036F TOBACCO NON-USER: CPT | Performed by: NURSE PRACTITIONER

## 2022-03-01 RX ORDER — TOPIRAMATE 100 MG/1
TABLET, FILM COATED ORAL
Qty: 60 TABLET | Refills: 5 | Status: SHIPPED | OUTPATIENT
Start: 2022-03-01 | End: 2022-10-31 | Stop reason: SDUPTHER

## 2022-03-01 RX ORDER — TOPIRAMATE 50 MG/1
TABLET, FILM COATED ORAL
Qty: 60 TABLET | Refills: 5 | Status: SHIPPED | OUTPATIENT
Start: 2022-03-01 | End: 2022-10-31 | Stop reason: SDUPTHER

## 2022-03-01 NOTE — PROGRESS NOTES
[facility]            [address]          [place], [address] [facility]          Dept: [phone]          Dept Fax: [phone]    MD Shaji Carrasco MD Ahmed B. Gus Sou, MD Cranston Caroline, MD Noretta Corwin, CNP            3/1/2022      HISTORY OF PRESENT ILLNESS:       I had the pleasure of seeing Yue Dowell, who returns for continuing neurologic care. The patient was seen last on November 29, 2021 for treatment of chronic tension type headaches, intractable chronic migraine headaches, and intermittent diplopia. The patient has chronic tension type headaches. The patient was previously prescribed nortriptyline. The patient has intractable chronic migraine headaches. The patient's last Botox injection was November 2021. The patient is prescribed Topamax 150 mg twice daily and botox injections for prophylaxis. For abortive therapy, the patient is prescribed Maxalt 10 mg. The patient is here today reporting his headaches are not getting worse or better. The patient is still getting 3 migraines a week. The patient states it is easily aborted with tylenol. He states it takes about an hour to abort in which his headaches are tolerable. The patient states he never took Maxalt. The patient states he does not wish to continue Botox. The patient states Topamax is effective in preventing an increase of migraine headaches. The patient states he notices an increase in migraine headaches when he does not take Topamax.       Headache location: holoacranial   Headache quality: pressure, pounding  Associated factors: photophobia, phonophobia  Intensity: 3/10  Headache chronicity: several years  Headache frequency: 3/week  Aggravating factors : bright lights, loud sounds   Relieving factors: tylenol, maxalt  Prophylactic medications: topamax, botox  Abortive medications: maxalt, tylenol  Previously used medications: amitriptyline, aimovig, imitrex, fioricet    The patient has intermittent diplopia secondary to hydrocephalus syndrome which is chronic. The patient developed a mastoid infection at the age of 11. In 2001, he had his first ventriculoperitoneal shunt. In the years following, he had four subsequent shunt revisions. The patient has had multiple MRIs over the years, showing stability of his ventricles and shunt placement. Testing reviewed:  MRI brain 5/22/20 no significant abnormalities demonstrated.  Shunt tubing appears intact on shunt series.  Cystic abnormality in the midline posterior to the third ventricle/midbrain with 2 posterior parietal shunt catheters with tips in the adjacent cyst.  Old right cerebellar infarct.  No acute hemorrhage, midline shift, or hydrocephalus.   -MRI brain 8/8/19   Tempie Countess is been interval placement of a second left-sided shunt catheter with significant interval decompression of the ventricular system, especially on the left.  The transependymal migration of CSF has lessened.  The frontal horns are nearly slit-like.  There is no evidence of new bleed,   pathologic contrast enhancement or pathologic extra-axial fluid collection to explain the patient's headaches.]                 PAST MEDICAL HISTORY:         Diagnosis Date    Acute bronchitis due to infection 12/5/2016    Back pain     Diplopia     Eczema     Headache(784.0)     Hearing loss     Hydrocephalus with operating shunt ([address]         PAST SURGICAL HISTORY:         Procedure Laterality Date    CHOLECYSTECTOMY, LAPAROSCOPIC  2001    MASTOIDECTOMY Right 2001    VENTRICULOPERITONEAL SHUNT      VENTRICULOPERITONEAL SHUNT  06/23/2014    REVISION        SOCIAL HISTORY:     Social History     Socioeconomic History    Marital status:      Spouse name: Not on file    Number of children: Not on file    Years of education: Not on file    Highest education level: Not on file   Occupational History    Not on file   Tobacco Use    Smoking status: Former Smoker     Packs/day: 0.50     Years: 20.00     Pack years: 10.00     Types: Cigarettes     Quit date: 9/3/2015     Years since quittin.4    Smokeless tobacco: Never Used   Vaping Use    Vaping Use: Never used   Substance and Sexual Activity    Alcohol use: No     Alcohol/week: 0.0 standard drinks    Drug use: No    Sexual activity: Yes     Partners: Female   Other Topics Concern    Not on file   Social History Narrative    Not on file     Social Determinants of Health     Financial Resource Strain:     Difficulty of Paying Living Expenses: Not on file   Food Insecurity:     Worried About Running Out of Food in the Last Year: Not on file    [place] of Food in the Last Year: Not on file   Transportation Needs:     Lack of Transportation (Medical): Not on file    Lack of Transportation (Non-Medical):  Not on file   Physical Activity:     Days of Exercise per Week: Not on file    Minutes of Exercise per Session: Not on file   Stress:     Feeling of Stress : Not on file   Social Connections:     Frequency of Communication with Friends and Family: Not on file    Frequency of Social Gatherings with Friends and Family: Not on file    Attends Bahai Services: Not on file    Active Member of [address] [place] [address] [employer] or Organizations: Not on file    Attends Club or Organization Meetings: Not on file    Marital Status: Not on file   Intimate Partner Violence:     Fear of Current or Ex-Partner: Not on file    Emotionally Abused: Not on file    Physically Abused: Not on file    Sexually Abused: Not on file   Housing Stability:     Unable to Pay for Housing in the Last Year: Not on file    Number of [place] in the Last Year: Not on file    Unstable Housing in the Last Year: Not on file       CURRENT MEDICATIONS:     Current Outpatient Medications   Medication Sig Dispense Refill    topiramate (TOPAMAX) 100 MG tablet TAKE 1 TABLET BY MOUTH TWO TIMES A DAY 60 tablet 5    topiramate (TOPAMAX) 50 MG tablet TAKE 1 TABLET BY MOUTH TWO TIMES A DAY, along with 100 mg tablets for a total of 150 mg 2 times daily 60 tablet 5     No current facility-administered medications for this visit. ALLERGIES:   No Known Allergies                              REVIEW OF SYSTEMS        All items selected indicate a positive finding. Those items not selected are negative. Constitutional [] Weight loss/gain   [] Fatigue  [] Fever/Chills   HEENT [] Hearing Loss  [] Visual Disturbance  [] Tinnitus  [] Eye pain   Respiratory [] Shortness of Breath  [] Cough  [] Snoring   Cardiovascular [] Chest Pain  [] Palpitations  [] Lightheaded   GI [] Constipation  [] Diarrhea  [] Swallowing change  [] Nausea/vomiting    [] Urinary Frequency  [] Urinary Urgency   Musculoskeletal [] Neck pain  [] Back pain  [] Muscle pain  [] Restless legs   Dermatologic [] Skin changes   Neurologic [] Memory loss/confusion  [] Seizures  [] Trouble walking or imbalance  [] Dizziness  [] Sleep disturbance  [] Weakness  [] Numbness  [] Tremors  [] Speech Difficulty  [x] Headaches  [x] Light Sensitivity  [x] Sound Sensitivity   Endocrinology []Excessive thirst  []Excessive hunger   Psychiatric [] Anxiety/Depression  [] Hallucination   Allergy/immunology []Hives/environmental allergies   Hematologic/lymph [] Abnormal bleeding  [] Abnormal bruising         PHYSICAL EXAMINATION:       Vitals:    03/01/22 0951   BP: 113/72   Pulse: 73   Temp: 97.7 °F (36.5 °C)                                              .                                                                                                     General Appearance:  Alert, cooperative, no signs of distress, appears stated age   Head:  Normocephalic, no signs of trauma   Eyes:  Conjunctiva/corneas clear;  eyelids intact   Ears:  Normal external ear and canals   Nose: Nares normal, mucosa normal, no drainage    Throat: Lips and tongue normal; teeth normal;  gums normal   Neck: Supple, intact flexion, extension and rotation;   trachea midline;  no adenopathy;   thyroid: not enlarged;   no carotid pulse abnormality   Back:   Symmetric, no curvature, ROM adequate   Lungs:   Respirations unlabored   Heart:  Regular rate and rhythm           Extremities: Extremities normal, no cyanosis, no edema   Pulses: Symmetric over head and neck   Skin: Skin color, texture normal, no rashes, no lesions                                     NEUROLOGIC EXAMINATION    Neurologic Exam  Mental status    Alert and oriented x 3; intact memory with no confusion, speech or language problems; no hallucinations or delusions  Fund of information appropriate for level of education    Cranial nerves    II - visual fields intact to confrontation bilaterally  III, IV, VI - extra-ocular muscles full: no pupillary defect; no VERONICA, no nystagmus, no ptosis   V - normal facial sensation                                                               VII - normal facial symmetry                                                             VIII - intact hearing                                                                             IX, X - symmetrical palate                                                                  XI - symmetrical shoulder shrug                                                       XII - tongue midline without atrophy or fasciculation      Motor function  Normal muscle bulk and tone; strength 5/5 on all 4 extremities, no pronator drift      Sensory function Intact to light touch, pinprick, vibration, proprioception on all 4 extremities      Cerebellar Intact fine motor movement. No involuntary movements or tremors. No ataxia or dysmetria on finger to nose or heel to shin testing      Reflex function DTR 2+ on bilateral UE and LE, symmetric. Down going toes bilaterally      Gait                   normal base and arm swing                  Medical Decision Making:        In summary, your patient, Roberta Myers exhibits the following, with associated plan:    1. Chronic tension type headaches. Previous medications include: nortriptyline. 2. Intractable chronic migraine headaches. He is currently getting 3 headaches/week that are of a decreased intensity at 3/10.  The patient reports his headaches are easily aborted with tylenol. Previous medications include amitriptyline, Aimovig , Botox, Topamax, Imitrex, Fioricet, and Botox injections. 1. Continue Topamax 150 mg twice daily, discussed with patient we will increase Topamax 200 mg twice daily if notice an increase in headaches without botox injections. 2. Stop botox injections for prophylaxis  3. Stop Maxalt 10 mg for abortive therapy  4. Return for follow up in 6 months. Advised patient to call the office if migraines increase. 3. Intermittent diplopia secondary to hydrocephalus syndrome which is chronic. The patient developed a mastoid infection at the age of 11. In 2001, he had his first ventriculoperitoneal shunt. In the years following, he had four subsequent shunt revisions.        Signed: Jordyn Isaacs CNP      *Please note that portions of this note were completed with a voice recognition program.  Although every effort was made to insure the accuracy of this automated transcription, some errors in transcription may have occurred, occasionally words and are mis-transcribed    Provider Attestation: The documentation recorded by the scribe accurately reflects the service I personally performed and the decisions made by myself. Portions of this note were transcribed by a scribe. I personally performed the history, physical exam, and medical decision-making and confirm the accuracy of the information in the transcribed note. Scribe Attestation:   By signing my name below, Elías Cerrato, attest that this documentation has been prepared under the direction and in the presence of Jordyn Isaacs CNP.

## 2022-05-27 PROBLEM — H61.20 IMPACTED CERUMEN: Status: ACTIVE | Noted: 2022-05-27

## 2022-05-27 PROBLEM — H72.90 PERFORATION OF TYMPANIC MEMBRANE: Status: ACTIVE | Noted: 2022-05-27

## 2022-05-27 PROBLEM — H91.90 HEARING LOSS: Status: ACTIVE | Noted: 2022-05-27

## 2022-06-01 ENCOUNTER — HOSPITAL ENCOUNTER (OUTPATIENT)
Age: 37
Discharge: HOME | End: 2022-06-01
Payer: MEDICAID

## 2022-06-01 DIAGNOSIS — Z98.2 S/P VENTRICULOPERITONEAL SHUNT: ICD-10-CM

## 2022-06-01 DIAGNOSIS — E55.9 VITAMIN D DEFICIENCY: ICD-10-CM

## 2022-06-01 DIAGNOSIS — R60.0 BILATERAL LOWER EXTREMITY EDEMA: ICD-10-CM

## 2022-06-01 DIAGNOSIS — Z76.89 ESTABLISHING CARE WITH NEW DOCTOR, ENCOUNTER FOR: ICD-10-CM

## 2022-06-01 DIAGNOSIS — Z13.29 THYROID DISORDER SCREEN: ICD-10-CM

## 2022-06-01 DIAGNOSIS — Z13.220 ENCOUNTER FOR SCREENING FOR LIPID DISORDER: ICD-10-CM

## 2022-06-01 DIAGNOSIS — R53.82 CHRONIC FATIGUE: ICD-10-CM

## 2022-06-01 DIAGNOSIS — Z01.89 ENCOUNTER FOR ROUTINE LABORATORY TESTING: ICD-10-CM

## 2022-06-01 LAB
ALBUMIN SERPL-MCNC: 4.3 G/DL (ref 3.5–5.2)
ALP BLD-CCNC: 122 U/L (ref 40–129)
ALT SERPL-CCNC: 19 U/L (ref 5–41)
ANION GAP SERPL CALCULATED.3IONS-SCNC: 12 MMOL/L (ref 9–17)
AST SERPL-CCNC: 27 U/L
BILIRUB SERPL-MCNC: 0.47 MG/DL (ref 0.3–1.2)
BUN BLDV-MCNC: 10 MG/DL (ref 6–20)
CALCIUM SERPL-MCNC: 9.2 MG/DL (ref 8.6–10.4)
CHLORIDE BLD-SCNC: 109 MMOL/L (ref 98–107)
CHOLESTEROL/HDL RATIO: 4.3
CHOLESTEROL: 197 MG/DL
CO2: 23 MMOL/L (ref 20–31)
CREAT SERPL-MCNC: 0.94 MG/DL (ref 0.7–1.2)
GFR AFRICAN AMERICAN: >60 ML/MIN
GFR NON-AFRICAN AMERICAN: >60 ML/MIN
GFR SERPL CREATININE-BSD FRML MDRD: ABNORMAL ML/MIN/{1.73_M2}
GLUCOSE BLD-MCNC: 99 MG/DL (ref 70–99)
HDLC SERPL-MCNC: 46 MG/DL
LDL CHOLESTEROL: 128 MG/DL (ref 0–130)
POTASSIUM SERPL-SCNC: 4.4 MMOL/L (ref 3.7–5.3)
SODIUM BLD-SCNC: 144 MMOL/L (ref 135–144)
TOTAL PROTEIN: 6.7 G/DL (ref 6.4–8.3)
TRIGL SERPL-MCNC: 114 MG/DL
TSH SERPL DL<=0.05 MIU/L-ACNC: 1.39 UIU/ML (ref 0.3–5)
VITAMIN D 25-HYDROXY: 22.5 NG/ML

## 2022-06-01 PROCEDURE — 82306 VITAMIN D 25 HYDROXY: CPT

## 2022-06-01 PROCEDURE — 80053 COMPREHEN METABOLIC PANEL: CPT

## 2022-06-01 PROCEDURE — 84443 ASSAY THYROID STIM HORMONE: CPT

## 2022-06-01 PROCEDURE — 80061 LIPID PANEL: CPT

## 2022-06-01 PROCEDURE — 36415 COLL VENOUS BLD VENIPUNCTURE: CPT

## 2022-07-08 ENCOUNTER — TELEPHONE (OUTPATIENT)
Dept: NEUROLOGY | Age: 37
End: 2022-07-08

## 2022-07-08 NOTE — TELEPHONE ENCOUNTER
Patient phoned in has had a migraine for 3 days and is asking for medication to help his migraine. Please advise.

## 2022-07-11 RX ORDER — PREDNISONE 20 MG/1
TABLET ORAL
Qty: 18 TABLET | Refills: 0 | Status: SHIPPED | OUTPATIENT
Start: 2022-07-11 | End: 2022-08-05

## 2022-07-22 ENCOUNTER — TELEPHONE (OUTPATIENT)
Dept: NEUROLOGY | Age: 37
End: 2022-07-22

## 2022-07-22 DIAGNOSIS — G43.711 INTRACTABLE CHRONIC MIGRAINE WITHOUT AURA AND WITH STATUS MIGRAINOSUS: Primary | ICD-10-CM

## 2022-07-22 DIAGNOSIS — G44.221 CHRONIC TENSION-TYPE HEADACHE, INTRACTABLE: ICD-10-CM

## 2022-07-22 RX ORDER — BUTALBITAL, ACETAMINOPHEN AND CAFFEINE 50; 325; 40 MG/1; MG/1; MG/1
1 TABLET ORAL EVERY 6 HOURS PRN
Qty: 30 TABLET | Refills: 0 | Status: SHIPPED | OUTPATIENT
Start: 2022-07-22

## 2022-07-22 NOTE — TELEPHONE ENCOUNTER
Sammy Friedman called the office and stated that he has taken the prednisone and it has not helped at all. He still has a terrible migraine. He would like to know if there is anything else that can be done to help him? Please advise.

## 2022-07-22 NOTE — TELEPHONE ENCOUNTER
Brenda FENG MA spoke with Gerry Knox today at 2:15 pm.   Gerry Exon called the office and stated that he has taken the prednisone and it has not helped at all. He still has a terrible migraine. He would like to know if there is anything else that can be done to help him? I called Gerry Knox at [phone]  to find out if the Prednisone taper had helped at all but I had to leave a messge. He finished the taper a few days ago. He has taken Tylenol because that is more helpful than ibuprofen. Kathleen Holder CNP is out of the office and there are no providers in the office. I advised him I can send a message to the doctor on call but I am not sure if I will have any answer back. He said he doesn't like hospitals so he doesn't want to go to ER. He said if we can't get anything for today he can wait until Monday. He uses [facility] on [address] in [place].

## 2022-07-28 ENCOUNTER — HOSPITAL ENCOUNTER (EMERGENCY)
Age: 37
Discharge: HOME | End: 2022-07-28
Attending: EMERGENCY MEDICINE
Payer: MEDICAID

## 2022-07-28 VITALS
TEMPERATURE: 97.9 F | OXYGEN SATURATION: 99 % | HEART RATE: 68 BPM | DIASTOLIC BLOOD PRESSURE: 68 MMHG | BODY MASS INDEX: 24.38 KG/M2 | HEIGHT: 74 IN | WEIGHT: 190 LBS | SYSTOLIC BLOOD PRESSURE: 103 MMHG | RESPIRATION RATE: 16 BRPM

## 2022-07-28 DIAGNOSIS — G43.009 MIGRAINE WITHOUT AURA AND WITHOUT STATUS MIGRAINOSUS, NOT INTRACTABLE: Primary | ICD-10-CM

## 2022-07-28 PROCEDURE — 99284 EMERGENCY DEPT VISIT MOD MDM: CPT

## 2022-07-28 PROCEDURE — 6360000002 HC RX W HCPCS: Performed by: EMERGENCY MEDICINE

## 2022-07-28 PROCEDURE — 96372 THER/PROPH/DIAG INJ SC/IM: CPT

## 2022-07-28 RX ORDER — KETOROLAC TROMETHAMINE 30 MG/ML
30 INJECTION, SOLUTION INTRAMUSCULAR; INTRAVENOUS ONCE
Status: COMPLETED | OUTPATIENT
Start: 2022-07-28 | End: 2022-07-28

## 2022-07-28 RX ORDER — METOCLOPRAMIDE HYDROCHLORIDE 5 MG/ML
10 INJECTION INTRAMUSCULAR; INTRAVENOUS ONCE
Status: COMPLETED | OUTPATIENT
Start: 2022-07-28 | End: 2022-07-28

## 2022-07-28 RX ADMIN — METOCLOPRAMIDE 10 MG: 5 INJECTION, SOLUTION INTRAMUSCULAR; INTRAVENOUS at 02:02

## 2022-07-28 RX ADMIN — KETOROLAC TROMETHAMINE 30 MG: 30 INJECTION, SOLUTION INTRAMUSCULAR; INTRAVENOUS at 02:02

## 2022-07-28 NOTE — ED TRIAGE NOTES
Mode of arrival (squad #, walk in, police, etc) : walk in        Chief complaint(s): migraine        Arrival Note (brief scenario, treatment PTA, etc). : pt complaining of migraine x1 week. Pt has a hx of migraines and takes prescription medication however pt states none of them have worked. C= \"Have you ever felt that you should Cut down on your drinking? \"  No  A= \"Have people Annoyed you by criticizing your drinking? \"  No  G= \"Have you ever felt bad or Guilty about your drinking? \"  No  E= \"Have you ever had a drink as an Eye-opener first thing in the morning to steady your nerves or to help a hangover? \"  No      Deferred []      Reason for deferring: N/A    *If yes to two or more: probable alcohol abuse. *

## 2022-07-28 NOTE — TELEPHONE ENCOUNTER
I tried calling the only listed number multiple time and received the message of: \"The service you're attempting to use has been restricted or is unavailable please contact customer service for assistance\".

## 2022-07-28 NOTE — ED NOTES
Dr. Casandra Webb at bedside for results udpate. POC includes d/c home. Patient encouraged to f/u with Neuro. Encouraged to continue previously prescribed meds. Ambulatory out of dept with steady gait.       Aditya Lucia RN  07/28/22 5830

## 2022-07-29 ENCOUNTER — APPOINTMENT (OUTPATIENT)
Dept: CT IMAGING | Age: 37
End: 2022-07-29
Payer: MEDICAID

## 2022-07-29 ENCOUNTER — HOSPITAL ENCOUNTER (EMERGENCY)
Age: 37
Discharge: HOME HEALTH | End: 2022-07-29
Attending: EMERGENCY MEDICINE
Payer: MEDICAID

## 2022-07-29 ENCOUNTER — APPOINTMENT (OUTPATIENT)
Dept: GENERAL RADIOLOGY | Age: 37
End: 2022-07-29
Payer: MEDICAID

## 2022-07-29 VITALS
TEMPERATURE: 98.4 F | HEART RATE: 48 BPM | RESPIRATION RATE: 16 BRPM | WEIGHT: 190 LBS | BODY MASS INDEX: 24.38 KG/M2 | SYSTOLIC BLOOD PRESSURE: 130 MMHG | HEIGHT: 74 IN | DIASTOLIC BLOOD PRESSURE: 70 MMHG | OXYGEN SATURATION: 100 %

## 2022-07-29 DIAGNOSIS — N20.0 KIDNEY STONE: Primary | ICD-10-CM

## 2022-07-29 LAB
ABSOLUTE EOS #: 0 K/UL (ref 0–0.4)
ABSOLUTE LYMPH #: 0.66 K/UL (ref 1–4.8)
ABSOLUTE MONO #: 0.44 K/UL (ref 0.1–1.3)
ALBUMIN SERPL-MCNC: 4.4 G/DL (ref 3.5–5.2)
ALP BLD-CCNC: 114 U/L (ref 40–129)
ALT SERPL-CCNC: 14 U/L (ref 5–41)
ANION GAP SERPL CALCULATED.3IONS-SCNC: 13 MMOL/L (ref 9–17)
AST SERPL-CCNC: 16 U/L
ATYPICAL LYMPHOCYTE ABSOLUTE COUNT: 0.22 K/UL
ATYPICAL LYMPHOCYTES: 1 %
BACTERIA: NORMAL
BASOPHILS # BLD: 0 % (ref 0–2)
BASOPHILS ABSOLUTE: 0 K/UL (ref 0–0.2)
BILIRUB SERPL-MCNC: 0.43 MG/DL (ref 0.3–1.2)
BILIRUBIN URINE: NEGATIVE
BUN BLDV-MCNC: 16 MG/DL (ref 6–20)
CALCIUM SERPL-MCNC: 9.2 MG/DL (ref 8.6–10.4)
CASTS UA: NORMAL /LPF
CHLORIDE BLD-SCNC: 106 MMOL/L (ref 98–107)
CO2: 23 MMOL/L (ref 20–31)
COLOR: ABNORMAL
CREAT SERPL-MCNC: 1.25 MG/DL (ref 0.7–1.2)
EOSINOPHILS RELATIVE PERCENT: 0 % (ref 0–4)
EPITHELIAL CELLS UA: NORMAL /HPF
GFR AFRICAN AMERICAN: >60 ML/MIN
GFR NON-AFRICAN AMERICAN: >60 ML/MIN
GFR SERPL CREATININE-BSD FRML MDRD: ABNORMAL ML/MIN/{1.73_M2}
GLUCOSE BLD-MCNC: 165 MG/DL (ref 70–99)
GLUCOSE URINE: NEGATIVE
HCT VFR BLD CALC: 51.3 % (ref 41–53)
HEMOGLOBIN: 17.2 G/DL (ref 13.5–17.5)
KETONES, URINE: ABNORMAL
LEUKOCYTE ESTERASE, URINE: ABNORMAL
LYMPHOCYTES # BLD: 3 % (ref 24–44)
MCH RBC QN AUTO: 31.6 PG (ref 26–34)
MCHC RBC AUTO-ENTMCNC: 33.6 G/DL (ref 31–37)
MCV RBC AUTO: 94.2 FL (ref 80–100)
MONOCYTES # BLD: 2 % (ref 1–7)
MORPHOLOGY: NORMAL
NITRITE, URINE: NEGATIVE
PDW BLD-RTO: 13.2 % (ref 11.5–14.9)
PH UA: 8 (ref 5–8)
PLATELET # BLD: 174 K/UL (ref 150–450)
PMV BLD AUTO: 9.1 FL (ref 6–12)
POTASSIUM SERPL-SCNC: 4 MMOL/L (ref 3.7–5.3)
PROTEIN UA: ABNORMAL
RBC # BLD: 5.45 M/UL (ref 4.5–5.9)
RBC UA: NORMAL /HPF
SEG NEUTROPHILS: 94 % (ref 36–66)
SEGMENTED NEUTROPHILS ABSOLUTE COUNT: 20.68 K/UL (ref 1.3–9.1)
SODIUM BLD-SCNC: 142 MMOL/L (ref 135–144)
SPECIFIC GRAVITY UA: 1.02 (ref 1–1.03)
TOTAL CK: 240 U/L (ref 39–308)
TOTAL PROTEIN: 6.5 G/DL (ref 6.4–8.3)
TURBIDITY: ABNORMAL
URINE HGB: ABNORMAL
UROBILINOGEN, URINE: NORMAL
WBC # BLD: 22 K/UL (ref 3.5–11)
WBC UA: NORMAL /HPF

## 2022-07-29 PROCEDURE — 85025 COMPLETE CBC W/AUTO DIFF WBC: CPT

## 2022-07-29 PROCEDURE — 81001 URINALYSIS AUTO W/SCOPE: CPT

## 2022-07-29 PROCEDURE — 6360000002 HC RX W HCPCS: Performed by: EMERGENCY MEDICINE

## 2022-07-29 PROCEDURE — 36415 COLL VENOUS BLD VENIPUNCTURE: CPT

## 2022-07-29 PROCEDURE — 96374 THER/PROPH/DIAG INJ IV PUSH: CPT

## 2022-07-29 PROCEDURE — 71045 X-RAY EXAM CHEST 1 VIEW: CPT

## 2022-07-29 PROCEDURE — 6370000000 HC RX 637 (ALT 250 FOR IP): Performed by: EMERGENCY MEDICINE

## 2022-07-29 PROCEDURE — 99284 EMERGENCY DEPT VISIT MOD MDM: CPT

## 2022-07-29 PROCEDURE — 82550 ASSAY OF CK (CPK): CPT

## 2022-07-29 PROCEDURE — 74176 CT ABD & PELVIS W/O CONTRAST: CPT

## 2022-07-29 PROCEDURE — 80053 COMPREHEN METABOLIC PANEL: CPT

## 2022-07-29 PROCEDURE — 70450 CT HEAD/BRAIN W/O DYE: CPT

## 2022-07-29 PROCEDURE — 2580000003 HC RX 258: Performed by: EMERGENCY MEDICINE

## 2022-07-29 RX ORDER — KETOROLAC TROMETHAMINE 30 MG/ML
30 INJECTION, SOLUTION INTRAMUSCULAR; INTRAVENOUS ONCE
Status: COMPLETED | OUTPATIENT
Start: 2022-07-29 | End: 2022-07-29

## 2022-07-29 RX ORDER — CEPHALEXIN 500 MG/1
500 CAPSULE ORAL 2 TIMES DAILY
Qty: 14 CAPSULE | Refills: 0 | Status: SHIPPED | OUTPATIENT
Start: 2022-07-29 | End: 2022-08-05

## 2022-07-29 RX ORDER — ONDANSETRON 4 MG/1
4 TABLET, ORALLY DISINTEGRATING ORAL ONCE
Status: COMPLETED | OUTPATIENT
Start: 2022-07-29 | End: 2022-07-29

## 2022-07-29 RX ORDER — 0.9 % SODIUM CHLORIDE 0.9 %
1000 INTRAVENOUS SOLUTION INTRAVENOUS ONCE
Status: COMPLETED | OUTPATIENT
Start: 2022-07-29 | End: 2022-07-29

## 2022-07-29 RX ADMIN — SODIUM CHLORIDE 1000 ML: 9 INJECTION, SOLUTION INTRAVENOUS at 14:25

## 2022-07-29 RX ADMIN — KETOROLAC TROMETHAMINE 30 MG: 30 INJECTION, SOLUTION INTRAMUSCULAR; INTRAVENOUS at 14:20

## 2022-07-29 RX ADMIN — ONDANSETRON 4 MG: 4 TABLET, ORALLY DISINTEGRATING ORAL at 14:20

## 2022-07-29 ASSESSMENT — ENCOUNTER SYMPTOMS
CONSTIPATION: 0
VOMITING: 0
ABDOMINAL PAIN: 0
NAUSEA: 1
SHORTNESS OF BREATH: 0
EYE PAIN: 0
DIARRHEA: 0
BACK PAIN: 0
SORE THROAT: 0
COUGH: 0
CHEST TIGHTNESS: 0

## 2022-07-29 ASSESSMENT — PAIN SCALES - GENERAL: PAINLEVEL_OUTOF10: 8

## 2022-07-29 ASSESSMENT — PAIN - FUNCTIONAL ASSESSMENT: PAIN_FUNCTIONAL_ASSESSMENT: 0-10

## 2022-07-29 NOTE — ED PROVIDER NOTES
[address] ED  eMERGENCY dEPARTMENT eNCOUnter    Pt Name: Jasmin Poon  MRN: [id number]  [place] 1985  Date of evaluation: 7/29/22  CHIEF COMPLAINT       Chief Complaint   Patient presents with    Hematuria    Nausea    Abdominal Pain     HISTORY OF PRESENT ILLNESS   HPI  Dark red urine this morning. Left flank pain started today, constant 6/10 in severity. Associated with 2-3 episodes of vomiting. Has had a headache for the past 2 weeks. Last shunt revision in 2015, he states his neurologist is aware. REVIEW OF SYSTEMS     Review of Systems   Constitutional:  Negative for chills and fever. HENT:  Negative for congestion, ear pain and sore throat. Eyes:  Negative for pain and visual disturbance. Respiratory:  Negative for cough, chest tightness and shortness of breath. Cardiovascular:  Negative for chest pain and palpitations. Gastrointestinal:  Positive for nausea. Negative for abdominal pain, constipation, diarrhea and vomiting. Genitourinary:  Positive for flank pain. Negative for dysuria and testicular pain. Musculoskeletal:  Negative for back pain. Skin:  Negative for rash and wound. Neurological:  Negative for seizures, syncope and headaches.    PASTMEDICAL HISTORY     Past Medical History:   Diagnosis Date    Acute bronchitis due to infection 12/5/2016    Back pain     Diplopia     Eczema     Headache(784.0)     Hearing loss     Hydrocephalus with operating shunt [facility])      SURGICAL HISTORY       Past Surgical History:   Procedure Laterality Date    CHOLECYSTECTOMY, LAPAROSCOPIC  2001    MASTOIDECTOMY Right 2001    VENTRICULOPERITONEAL SHUNT      VENTRICULOPERITONEAL SHUNT  06/23/2014    REVISION     CURRENT MEDICATIONS       Previous Medications    BUTALBITAL-ACETAMINOPHEN-CAFFEINE (FIORICET, ESGIC) -40 MG PER TABLET    Take 1 tablet by mouth every 6 hours as needed for Headaches    PREDNISONE (DELTASONE) 20 MG TABLET    3 tabs QD x 3 days then 2 tabs x 3 days then 1 tenderness. Musculoskeletal:         General: No tenderness or deformity. Normal range of motion. Cervical back: Normal range of motion and neck supple. Skin:     General: Skin is warm and dry. Neurological:      Mental Status: He is alert and oriented to person, place, and time. Cranial Nerves: No cranial nerve deficit. Coordination: Coordination normal.       MEDICAL DECISION MAKING:   Patient presenting with left flank pain, headache and nausea. Labs with mild MIHAI but patient tolerating PO intake, IVF given. CT with 4mm stone at the left UPJ with moderate hydro. UA with evidence of infection. CT head given to rule out shunt dysfunction in the setting of headache, vomiting and bradycardia. CT head with no enlargement of the ventricles. D/w Dr Maurilio Rosario on call for Urology. He will f/up as outpatient. Home with keflex. Procedures    DIAGNOSTIC RESULTS   EKG: All EKG's are interpreted by the Emergency Department Physician who either signs or Co-signs this chart inthe absence of a cardiologist.      RADIOLOGY:All plain film, CT, MRI, and formal ultrasound images (except ED bedside ultrasound) are read by the radiologist, see reports below, unless otherwise noted in MDM or here. XR SHUNT SERIES PLACEMENT (<4 VIEWS)   Final Result   Visualized ventriculoperitoneal shunt tubing appears intact, though please   note that a segment in the mid chest is suboptimally visualized due to   overlap with the spine on the frontal projection. A lateral view of the   chest can be considered to better visualize the tubing in this location. Fluid levels in the colon. Correlate for any symptoms of   infectious/inflammatory colitis. CT HEAD WO CONTRAST   Final Result   1. Bilateral ventricular shunt catheters are unchanged in position. Ventricular size and morphology is also unchanged. 2. No new intracranial abnormality.          CT ABDOMEN PELVIS WO CONTRAST Additional Contrast? None Final Result   Bilateral renal stones with the largest stone on the left measuring 6 mm. Couple of small stones seen in the distal left ureter and there is a 4 mm   stone in the left UVJ. Mild to moderate left hydronephrosis. Tyesha Michael LABS: All lab results were reviewed by myself, and all abnormals are listed below. Labs Reviewed   CBC WITH AUTO DIFFERENTIAL - Abnormal; Notable for the following components:       Result Value    WBC 22.0 (*)     Seg Neutrophils 94 (*)     Lymphocytes 3 (*)     Segs Absolute 20.68 (*)     Absolute Lymph # 0.66 (*)     All other components within normal limits   COMPREHENSIVE METABOLIC PANEL - Abnormal; Notable for the following components:    Glucose 165 (*)     Creatinine 1.25 (*)     All other components within normal limits   URINALYSIS - Abnormal; Notable for the following components:    Color, UA Orange (*)     Turbidity UA Turbid (*)     Ketones, Urine SMALL (*)     Urine Hgb LARGE (*)     Protein, UA 1+ (*)     Leukocyte Esterase, Urine SMALL (*)     All other components within normal limits   CK   MICROSCOPIC URINALYSIS     EMERGENCY DEPARTMENT COURSE:   Vitals:    Vitals:    07/29/22 1251   BP: 130/70   Pulse: (!) 48   Resp: 16   Temp: 98.4 °F (36.9 °C)   SpO2: 100%   Weight: 190 lb (86.2 kg)   Height: 6' 2\" (1.88 m)       The patient was given the following medications while in the emergency department:  Orders Placed This Encounter   Medications    0.9 % sodium chloride bolus    ketorolac (TORADOL) injection 30 mg    ondansetron (ZOFRAN-ODT) disintegrating tablet 4 mg    cephALEXin (KEFLEX) 500 MG capsule     Sig: Take 1 capsule by mouth in the morning and 1 capsule before bedtime. Do all this for 7 days.      Dispense:  14 capsule     Refill:  0     CONSULTS:  IP CONSULT TO UROLOGY    FINAL IMPRESSION      1. Kidney stone          DISPOSITION/PLAN   DISPOSITION Decision To Discharge 07/29/2022 05:33:39 PM      PATIENT REFERRED TO:  Joshua Estrada MD  [address] [place] [address] [facility] SPECIALTY [address]    Schedule an appointment as soon as possible for a visit     DISCHARGE MEDICATIONS:  New Prescriptions    CEPHALEXIN (KEFLEX) 500 MG CAPSULE    Take 1 capsule by mouth in the morning and 1 capsule before bedtime. Do all this for 7 days.      Danielle Ocasio MD  AttendingEmergency Physician                        Moiz Nicole MD  07/29/22 0735

## 2022-07-29 NOTE — ED TRIAGE NOTES
Patient to emergency department with complaints of hematuria which began this morning, abdominal pain which developed later this afternoon along with nausea/vomiting. Pt rates the pain 8 out of 10. Reports taking alb-acetamin-caff -40 with no relief.

## 2022-08-04 NOTE — ED PROVIDER NOTES
Sammy García    Pt Name: Sarah Perez  MRN: [id number]  [place] 1985  Date of evaluation: 8/4/22  CHIEF COMPLAINT       Chief Complaint   Patient presents with    Migraine     x1 week     HISTORY OF PRESENT ILLNESS     Headache  Pain location:  Frontal  Radiates to:  Does not radiate  Severity currently:  6/10  Onset quality:  Gradual  Duration:  2 days  Timing:  Constant  Progression:  Unchanged  Chronicity:  Recurrent  Similar to prior headaches: yes    Context: not exposure to bright light    Worsened by:  Nothing    Patient has history of migraines and presents with migraine symptoms. Patient notes the symptoms are similar to prior. REVIEW OF SYSTEMS     Review of Systems   Neurological:  Positive for headaches. All other systems reviewed and are negative. PASTMEDICAL HISTORY     Past Medical History:   Diagnosis Date    Acute bronchitis due to infection 12/5/2016    Back pain     Diplopia     Eczema     Headache(784.0)     Hearing loss     Hydrocephalus with operating shunt [facility])      Past Problem List  Patient Active Problem List   Diagnosis Code    Hydrocephalus with operating shunt ([facility] [address] G91.9    Headache due to intracranial disease R51.9, G93.9    Chronic brain-hydrocephalus syndrome ([facility] [address] G91.8    S/P ventriculoperitoneal shunt Z98.2    Ex-smoker Z87.891    Obstructed  shunt ([facility]CC) T85. 09XA    Plantar wart of left foot B07.0    Chronic fatigue R53.82    Hammer toe, acquired b/l M20.40    Anemia D64.9    Blood alkaline phosphatase increased compared with prior measurement R74.8    Chronic tension-type headache, intractable G44.221    Abscess of oral tissue K12.2    Intractable chronic migraine without aura and with status migrainosus G43.711    Hearing loss H91.90    Impacted cerumen H61.20    Perforation of tympanic membrane H72.90     SURGICAL HISTORY       Past Surgical History:   Procedure Laterality Date    CHOLECYSTECTOMY, LAPAROSCOPIC  2001    MASTOIDECTOMY Right 2001 VENTRICULOPERITONEAL SHUNT      VENTRICULOPERITONEAL SHUNT  2014    REVISION     CURRENT MEDICATIONS       Discharge Medication List as of 2022  3:40 AM        CONTINUE these medications which have NOT CHANGED    Details   butalbital-acetaminophen-caffeine (FIORICET, ESGIC) -40 MG per tablet Take 1 tablet by mouth every 6 hours as needed for Headaches, Disp-30 tablet, R-0Normal      predniSONE (DELTASONE) 20 MG tablet 3 tabs QD x 3 days then 2 tabs x 3 days then 1 tab QD x 3 days, Disp-18 tablet, R-0Normal      vitamin D3 (CHOLECALCIFEROL) 25 MCG (1000 UT) TABS tablet Take 1 tablet by mouth daily, Disp-30 tablet, R-2Normal      !! topiramate (TOPAMAX) 100 MG tablet TAKE 1 TABLET BY MOUTH TWO TIMES A DAY, Disp-60 tablet, R-5Normal      !! topiramate (TOPAMAX) 50 MG tablet TAKE 1 TABLET BY MOUTH TWO TIMES A DAY, along with 100 mg tablets for a total of 150 mg 2 times daily, Disp-60 tablet, R-5Normal       !! - Potential duplicate medications found. Please discuss with provider. ALLERGIES     has No Known Allergies. FAMILY HISTORY     He indicated that the status of his mother is unknown. He indicated that the status of his father is unknown. He indicated that the status of his maternal grandmother is unknown. He indicated that the status of his maternal grandfather is unknown.      SOCIAL HISTORY       Social History     Tobacco Use    Smoking status: Former     Packs/day: 0.50     Years: 20.00     Pack years: 10.00     Types: Cigarettes     Quit date: 9/3/2015     Years since quittin.9    Smokeless tobacco: Never   Vaping Use    Vaping Use: Never used   Substance Use Topics    Alcohol use: No     Alcohol/week: 0.0 standard drinks    Drug use: No     PHYSICAL EXAM     INITIAL VITALS: /68   Pulse 68   Temp 97.9 °F (36.6 °C)   Resp 16   Ht 6' 2\" (1.88 m)   Wt 190 lb (86.2 kg)   SpO2 99%   BMI 24.39 kg/m²    Physical Exam  Constitutional:       General: He is not in acute distress. Appearance: Normal appearance. He is well-developed. He is not diaphoretic. HENT:      Head: Normocephalic and atraumatic. Right Ear: External ear normal.      Left Ear: External ear normal.      Nose: Nose normal. No congestion. Mouth/Throat:      Mouth: Mucous membranes are moist.      Pharynx: Oropharynx is clear. Eyes:      General:         Right eye: No discharge. Left eye: No discharge. Conjunctiva/sclera: Conjunctivae normal.      Pupils: Pupils are equal, round, and reactive to light. Neck:      Trachea: No tracheal deviation. Cardiovascular:      Rate and Rhythm: Normal rate and regular rhythm. Pulses: Normal pulses. Heart sounds: Normal heart sounds. Pulmonary:      Effort: Pulmonary effort is normal. No respiratory distress. Breath sounds: Normal breath sounds. No stridor. No wheezing or rales. Abdominal:      Palpations: Abdomen is soft. Tenderness: There is no abdominal tenderness. There is no guarding or rebound. Musculoskeletal:         General: No tenderness or deformity. Normal range of motion. Cervical back: Normal range of motion and neck supple. Skin:     General: Skin is warm and dry. Capillary Refill: Capillary refill takes less than 2 seconds. Findings: No erythema or rash. Neurological:      General: No focal deficit present. Mental Status: He is alert and oriented to person, place, and time. Coordination: Coordination normal.   Psychiatric:         Mood and Affect: Mood normal.         Behavior: Behavior normal.         Thought Content: Thought content normal.         Judgment: Judgment normal.       MEDICAL DECISION MAKING:     Patient presents with migraine symptoms was treated with supportive care medications reevaluated noted to be feeling improved.   Patient was advised to follow-up with his primary care physician at earliest available appointment       CRITICAL CARE:

## 2022-08-11 ENCOUNTER — OFFICE VISIT (OUTPATIENT)
Dept: UROLOGY | Age: 37
End: 2022-08-11
Payer: MEDICAID

## 2022-08-11 VITALS
HEIGHT: 74 IN | TEMPERATURE: 97.8 F | RESPIRATION RATE: 17 BRPM | DIASTOLIC BLOOD PRESSURE: 80 MMHG | SYSTOLIC BLOOD PRESSURE: 124 MMHG | WEIGHT: 212 LBS | HEART RATE: 78 BPM | BODY MASS INDEX: 27.21 KG/M2

## 2022-08-11 DIAGNOSIS — N20.0 KIDNEY STONE: ICD-10-CM

## 2022-08-11 DIAGNOSIS — R10.9 FLANK PAIN: Primary | ICD-10-CM

## 2022-08-11 PROCEDURE — 4004F PT TOBACCO SCREEN RCVD TLK: CPT | Performed by: UROLOGY

## 2022-08-11 PROCEDURE — G8427 DOCREV CUR MEDS BY ELIG CLIN: HCPCS | Performed by: UROLOGY

## 2022-08-11 PROCEDURE — G8417 CALC BMI ABV UP PARAM F/U: HCPCS | Performed by: UROLOGY

## 2022-08-11 PROCEDURE — 99204 OFFICE O/P NEW MOD 45 MIN: CPT | Performed by: UROLOGY

## 2022-08-11 RX ORDER — TAMSULOSIN HYDROCHLORIDE 0.4 MG/1
0.4 CAPSULE ORAL DAILY
Qty: 30 CAPSULE | Refills: 0 | Status: SHIPPED | OUTPATIENT
Start: 2022-08-11

## 2022-08-11 ASSESSMENT — ENCOUNTER SYMPTOMS
CONSTIPATION: 0
SHORTNESS OF BREATH: 0
COUGH: 0
VOMITING: 0
BACK PAIN: 0
NAUSEA: 0
EYE PAIN: 0
DIARRHEA: 0
ABDOMINAL PAIN: 0
WHEEZING: 0

## 2022-08-11 NOTE — PROGRESS NOTES
Migraines Maternal Grandmother     Heart Disease Maternal Grandfather     Diabetes Maternal Grandfather     Hypertension Maternal Grandfather     Migraines Maternal Grandfather     Stroke Maternal Grandfather      Outpatient Medications Marked as Taking for the 8/11/22 encounter (Office Visit) with Reginaldo Khalil MD   Medication Sig Dispense Refill    tamsulosin (FLOMAX) 0.4 MG capsule Take 1 capsule by mouth in the morning. 30 capsule 0    nortriptyline (PAMELOR) 10 MG capsule take 1 capsule by mouth nightly      butalbital-acetaminophen-caffeine (FIORICET, ESGIC) -40 MG per tablet Take 1 tablet by mouth every 6 hours as needed for Headaches 30 tablet 0    vitamin D3 (CHOLECALCIFEROL) 25 MCG (1000 UT) TABS tablet Take 1 tablet by mouth daily 30 tablet 2    topiramate (TOPAMAX) 100 MG tablet TAKE 1 TABLET BY MOUTH TWO TIMES A DAY 60 tablet 5    topiramate (TOPAMAX) 50 MG tablet TAKE 1 TABLET BY MOUTH TWO TIMES A DAY, along with 100 mg tablets for a total of 150 mg 2 times daily 60 tablet 5       Patient has no known allergies. Social History     Tobacco Use   Smoking Status Every Day    Packs/day: 0.50    Years: 20.00    Pack years: 10.00    Types: Cigarettes    Start date: 5/1/2022    Passive exposure: Never   Smokeless Tobacco Never      (If patient a smoker, smoking cessation counseling offered)   Social History     Substance and Sexual Activity   Alcohol Use No    Alcohol/week: 0.0 standard drinks       REVIEW OF SYSTEMS:  Review of Systems    Physical Exam:    This a 44 y.o. female      Vitals:    08/11/22 1055   BP: 124/80   Pulse: 78   Resp: 17   Temp: 97.8 °F (36.6 °C)     Body mass index is 27.22 kg/m². Physical Exam  Constitutional: Patient in no acute distress, ggod grooming, appropriately dressed  Neuro: Alert and oriented to person, place and time.   Psych:Mood normal, affect normal  Skin: No rash noted  HEENT: Head: Normocephalic and atraumatic,Conjunctivae and EOM are normal,Nose- normal, Right/Left External Ear: normal, Mouth: Mucosa Moist  Neck: Supple  Lungs: Respiratory effort is normal  Cardiovascular: strong and regular, no lower leg edema  Abdomen: Soft, non-tender, non-distended with no CVA,    Lymphatics: No cervical palpable lymphadenopathy. B        Assessment and Plan      1. Flank pain    2. Kidney stone            Plan:    Renal u/s and kub    Prescriptions Ordered:  Orders Placed This Encounter   Medications    tamsulosin (FLOMAX) 0.4 MG capsule     Sig: Take 1 capsule by mouth in the morning. Dispense:  30 capsule     Refill:  0        Orders Placed:  Orders Placed This Encounter   Procedures    US RENAL LIMITED     This procedure can be scheduled via [employer]. Access your [employer] account by visiting [email or web address]. Standing Status:   Future     Standing Expiration Date:   8/6/2023    XR ABDOMEN (KUB) (SINGLE AP VIEW)     Standing Status:   Future     Standing Expiration Date:   8/11/2023              Dionne Yanez MD    Agree with the ROS entered by the MA.

## 2022-08-11 NOTE — PROGRESS NOTES
Review of Systems   Constitutional:  Negative for appetite change, chills, fatigue and fever. Eyes:  Negative for pain and visual disturbance. Respiratory:  Negative for cough, shortness of breath and wheezing. Cardiovascular:  Negative for chest pain and leg swelling. Gastrointestinal:  Negative for abdominal pain, constipation, diarrhea, nausea and vomiting. Genitourinary:  Negative for difficulty urinating, dysuria, frequency, hematuria, penile pain and testicular pain. Musculoskeletal:  Negative for back pain and myalgias. Neurological:  Negative for dizziness, tremors, weakness, light-headedness, numbness and headaches. Hematological:  Negative for adenopathy. Does not bruise/bleed easily.    none

## 2022-08-26 ENCOUNTER — HOSPITAL ENCOUNTER (OUTPATIENT)
Dept: ULTRASOUND IMAGING | Age: 37
Discharge: HOME | End: 2022-08-28
Payer: MEDICAID

## 2022-08-26 DIAGNOSIS — N20.0 KIDNEY STONE: ICD-10-CM

## 2022-08-26 PROCEDURE — 76775 US EXAM ABDO BACK WALL LIM: CPT

## 2022-09-01 ENCOUNTER — TELEPHONE (OUTPATIENT)
Dept: NEUROLOGY | Age: 37
End: 2022-09-01

## 2022-09-01 NOTE — LETTER
[id number] [facility]  [place] [address] [place] [id number]  Phone: [phone]  Fax: [phone]      September 1, 2022     [address] [place] [address]      Dear Luis Fernando Grove: We are sorry that you missed your appointment with  Garfield Paiz  on 9/1/2022. Your health and follow-up medical care are important to us. Please call our office as soon as possible so that we may reschedule your appointment. If you have already rescheduled your appointment, please disregard this letter. This is the first scheduled appointment that you have missed within the last twelve months. If you miss 2 more appointment, you may be dismissed from the practice. We do recognize that everyones time is valuable and that appointment time is limited, therefore we do ask that you provide 24 hour notice if you are unable to keep your appointment.         Sincerely,        Sarah Beth, Neurology

## 2022-09-08 ENCOUNTER — OFFICE VISIT (OUTPATIENT)
Dept: UROLOGY | Age: 37
End: 2022-09-08
Payer: MEDICAID

## 2022-09-08 ENCOUNTER — TELEPHONE (OUTPATIENT)
Dept: UROLOGY | Age: 37
End: 2022-09-08

## 2022-09-08 ENCOUNTER — HOSPITAL ENCOUNTER (OUTPATIENT)
Age: 37
Discharge: HOME | End: 2022-09-10
Payer: MEDICAID

## 2022-09-08 ENCOUNTER — HOSPITAL ENCOUNTER (OUTPATIENT)
Dept: GENERAL RADIOLOGY | Age: 37
Discharge: HOME | End: 2022-09-10
Payer: MEDICAID

## 2022-09-08 VITALS
TEMPERATURE: 97.8 F | HEART RATE: 82 BPM | DIASTOLIC BLOOD PRESSURE: 89 MMHG | BODY MASS INDEX: 27.21 KG/M2 | WEIGHT: 212 LBS | HEIGHT: 74 IN | RESPIRATION RATE: 16 BRPM | SYSTOLIC BLOOD PRESSURE: 138 MMHG

## 2022-09-08 DIAGNOSIS — R10.9 FLANK PAIN: ICD-10-CM

## 2022-09-08 DIAGNOSIS — N20.0 KIDNEY STONE: ICD-10-CM

## 2022-09-08 DIAGNOSIS — N20.0 KIDNEY STONE: Primary | ICD-10-CM

## 2022-09-08 PROCEDURE — G8417 CALC BMI ABV UP PARAM F/U: HCPCS | Performed by: UROLOGY

## 2022-09-08 PROCEDURE — 74018 RADEX ABDOMEN 1 VIEW: CPT

## 2022-09-08 PROCEDURE — 4004F PT TOBACCO SCREEN RCVD TLK: CPT | Performed by: UROLOGY

## 2022-09-08 PROCEDURE — 99214 OFFICE O/P EST MOD 30 MIN: CPT | Performed by: UROLOGY

## 2022-09-08 PROCEDURE — G8427 DOCREV CUR MEDS BY ELIG CLIN: HCPCS | Performed by: UROLOGY

## 2022-09-08 ASSESSMENT — ENCOUNTER SYMPTOMS
COUGH: 0
NAUSEA: 0
CONSTIPATION: 0
ABDOMINAL PAIN: 0
BACK PAIN: 0
EYE PAIN: 0
DIARRHEA: 0
SHORTNESS OF BREATH: 0
VOMITING: 0
WHEEZING: 0

## 2022-09-08 NOTE — TELEPHONE ENCOUNTER
Pt had appt today w/ Dr. Brian Anguiano but only had part of the testing done that was ordered at previous appt. .. pt did not have ESWL done. .. Valeria Andrews per Dr. Brian Anguiano, pt to have ESWL done today (9/8/2022). .. KUB order given to patient in office, pt stated he would go to [facility] to have done today.       Per Dr. Brian Anguiano depending on results of KUB, pt may or may not need an ESWL, Isabell Ricks will continue to follow and notify Dr. Brian Anguiano of results once availible

## 2022-09-08 NOTE — PROGRESS NOTES
[address] [address]  Dept: [address] [facility] Urology Office Note - Established    Patient:  Yahaira Bowie  YOB: 1985  Date: 9/8/2022    The patient is a 44 y.o. male who presents todayfor evaluation of the following problems:   Chief Complaint   Patient presents with    Results     4 wweks /W us KUB NOTE DONE       HPI  Here for flank pain and kidney stone, no dysuria, no hematuria, pain improved   U/s reviewed     Summary of old records: N/A    Additional History: N/A    Procedures Today: N/A    Urinalysis today:  No results found for this visit on 09/08/22. Last several PSA's:  No results found for: PSA  Last total testosterone:  No results found for: TESTOSTERONE    AUA Symptom Score (9/8/2022):   INCOMPLETE EMPTYING: How often have you had the sensation of not emptying your bladder?: Not at all  FREQUENCY: How often do you have to urinate less than every two hours?: Not at all  INTERMITTENCY: How often have you found you stopped and started again several times when you urinated?: Not at all  URGENCY: How often have you found it difficult to postpone urination?: Not at all  WEAK STREAM: How often have you had a weak urinary stream?: Not at all  STRAINING: How often have you had to strain to start  urination?: Not at all  NOCTURIA: How many times did you typically get up at night to uriniate?: NONE  TOTAL I-PSS SCORE[de-identified] 0  How would you feel if you were to spend the rest of your life with your urinary condition?: Delighted    Last BUN and creatinine:  Lab Results   Component Value Date    BUN 16 07/29/2022     Lab Results   Component Value Date    CREATININE 1.25 (H) 07/29/2022       Additional Lab/Culture results: none    Imaging Reviewed during this Office Visit: none  (results were independently reviewed by physician and radiology report verified)    PAST MEDICAL, FAMILY AND SOCIAL HISTORY UPDATE:  Past Medical History:   Diagnosis Date    Acute bronchitis due to infection 12/5/2016    Back pain     Diplopia     Eczema     Headache(784.0)     Hearing loss     Hydrocephalus with operating shunt [facility])      Past Surgical History:   Procedure Laterality Date    CHOLECYSTECTOMY, LAPAROSCOPIC  2001    MASTOIDECTOMY Right 2001    VENTRICULOPERITONEAL SHUNT      VENTRICULOPERITONEAL SHUNT  06/23/2014    REVISION     Family History   Problem Relation Age of Onset    Migraines Mother     Heart Disease Father     Heart Disease Maternal Grandmother     Diabetes Maternal Grandmother     Hypertension Maternal Grandmother     Migraines Maternal Grandmother     Heart Disease Maternal Grandfather     Diabetes Maternal Grandfather     Hypertension Maternal Grandfather     Migraines Maternal Grandfather     Stroke Maternal Grandfather      Outpatient Medications Marked as Taking for the 9/8/22 encounter (Office Visit) with Darlin Morales MD   Medication Sig Dispense Refill    tamsulosin (FLOMAX) 0.4 MG capsule Take 1 capsule by mouth in the morning. 30 capsule 0    nortriptyline (PAMELOR) 10 MG capsule take 1 capsule by mouth nightly      butalbital-acetaminophen-caffeine (FIORICET, ESGIC) -40 MG per tablet Take 1 tablet by mouth every 6 hours as needed for Headaches 30 tablet 0    vitamin D3 (CHOLECALCIFEROL) 25 MCG (1000 UT) TABS tablet Take 1 tablet by mouth daily 30 tablet 2    topiramate (TOPAMAX) 100 MG tablet TAKE 1 TABLET BY MOUTH TWO TIMES A DAY 60 tablet 5    topiramate (TOPAMAX) 50 MG tablet TAKE 1 TABLET BY MOUTH TWO TIMES A DAY, along with 100 mg tablets for a total of 150 mg 2 times daily 60 tablet 5       Patient has no known allergies.   Social History     Tobacco Use   Smoking Status Every Day    Packs/day: 0.50    Years: 20.00    Pack years: 10.00    Types: Cigarettes    Start date: 5/1/2022    Passive exposure: Never   Smokeless Tobacco Never     (Ifpatient a smoker, smoking cessation counseling offered)    Social History     Substance and Sexual Activity   Alcohol Use No    Alcohol/week: 0.0 standard drinks       REVIEW OF SYSTEMS:  Review of Systems    Physical Exam:      Vitals:    09/08/22 0910   BP: 138/89   Pulse: 82   Resp: 16   Temp: 97.8 °F (36.6 °C)     Body mass index is 27.22 kg/m². Patient is a 44 y.o. male in no acute distress and alert and oriented to person, place and time. Physical Exam  Constitutional: Patient in no acute distress. Neuro: Alert and oriented to person, place and time. Psych: Mood normal, affect normal  Skin: No rash noted  HEENT: Head: Normocephalic andatraumatic  Conjunctivae and EOM are normal. Pupils are equal, round  Nose:Normal  Right External Ear: Normal; Left External Ear: Normal  Mouth: Mucosa Moist  Neck: Supple  Lungs: Respiratory effort is normal  Cardiovascular: Warm & Pink  Abdomen: Soft, non-tender, non-distended with no CVA,  No flank tenderness,  Or hepatosplenomegaly   Lymphatics: No palpablelymphadenopathy. Assessment and Plan      1. Kidney stone    2. Flank pain           Plan:     Kub and decide on eswl  Return for Surgery. Prescriptions Ordered:  No orders of the defined types were placed in this encounter. Orders Placed:  Orders Placed This Encounter   Procedures    XR ABDOMEN (KUB) (SINGLE AP VIEW)     Standing Status:   Future     Standing Expiration Date:   9/8/2023             Chastity Austin MD    Agree with the ROS entered by the MA.

## 2022-09-09 ENCOUNTER — PATIENT MESSAGE (OUTPATIENT)
Dept: UROLOGY | Age: 37
End: 2022-09-09

## 2022-10-31 RX ORDER — TOPIRAMATE 100 MG/1
TABLET, FILM COATED ORAL
Qty: 60 TABLET | Refills: 5 | Status: SHIPPED | OUTPATIENT
Start: 2022-10-31

## 2022-10-31 RX ORDER — TOPIRAMATE 50 MG/1
TABLET, FILM COATED ORAL
Qty: 60 TABLET | Refills: 5 | Status: SHIPPED | OUTPATIENT
Start: 2022-10-31

## 2022-10-31 NOTE — TELEPHONE ENCOUNTER
Pharmacy requesting refill of topiramate (TOPAMAX) 100 MG tablet / topiramate (TOPAMAX) 50 MG tablet      Medication active on med list yes      Date of last Rx: 03/01/2022 with 5 refills          verified by DAVID MCLEAN      Date of last appointment 03/01/2022    Next Visit Date:  11/29/2022

## 2022-11-29 ENCOUNTER — OFFICE VISIT (OUTPATIENT)
Dept: NEUROLOGY | Age: 37
End: 2022-11-29
Payer: MEDICAID

## 2022-11-29 VITALS
DIASTOLIC BLOOD PRESSURE: 78 MMHG | SYSTOLIC BLOOD PRESSURE: 135 MMHG | BODY MASS INDEX: 27.46 KG/M2 | HEIGHT: 74 IN | HEART RATE: 82 BPM | WEIGHT: 214 LBS

## 2022-11-29 DIAGNOSIS — G44.221 CHRONIC TENSION-TYPE HEADACHE, INTRACTABLE: ICD-10-CM

## 2022-11-29 DIAGNOSIS — G91.9 HYDROCEPHALUS WITH OPERATING SHUNT (HCC): ICD-10-CM

## 2022-11-29 DIAGNOSIS — G43.711 INTRACTABLE CHRONIC MIGRAINE WITHOUT AURA AND WITH STATUS MIGRAINOSUS: Primary | ICD-10-CM

## 2022-11-29 PROCEDURE — G8484 FLU IMMUNIZE NO ADMIN: HCPCS | Performed by: NURSE PRACTITIONER

## 2022-11-29 PROCEDURE — G8427 DOCREV CUR MEDS BY ELIG CLIN: HCPCS | Performed by: NURSE PRACTITIONER

## 2022-11-29 PROCEDURE — 4004F PT TOBACCO SCREEN RCVD TLK: CPT | Performed by: NURSE PRACTITIONER

## 2022-11-29 PROCEDURE — 99214 OFFICE O/P EST MOD 30 MIN: CPT | Performed by: NURSE PRACTITIONER

## 2022-11-29 PROCEDURE — G8417 CALC BMI ABV UP PARAM F/U: HCPCS | Performed by: NURSE PRACTITIONER

## 2022-11-29 RX ORDER — ATOGEPANT 60 MG/1
60 TABLET ORAL DAILY
Qty: 30 TABLET | Refills: 5
Start: 2022-11-29 | End: 2022-12-01 | Stop reason: SDUPTHER

## 2022-11-29 NOTE — PROGRESS NOTES
[facility]            [address]          [facility], [address] [facility]          Dept: [phone]          Dept Fax: [phone]    MD Edvin Haq MD Orien Espy, MD Tricia Greener, SOURAV            11/29/2022      HISTORY OF PRESENT ILLNESS:       I had the pleasure of seeing Brooklynn Childs, who returns for continuing neurologic care. The patient was seen last on March 1, 2022 for treatment of chronic tension type headaches, intractable chronic migraine headaches and intermittent diplopia. The patient has chronic tension type headaches and was previously prescribed nortriptyline. For prophylaxis of his migraine headaches he was prescribed topamax 150 mg twice daily. He contacted the office in July 2022 stating that he was having migraine lasting for 3 days and was prescribed a steroid taper. He then contacted the office two weeks later stating that the prednisone was ineffective however did not want to attend the ER. He was prescribed fioricet at that time however is no longer taking. He is here today reporting he has continued having 3-4 migraines/week and has been using tylenol for abortive therapy. His headaches are a 4-6/10 in intensity. Previous use of botox and imovig injections provided him with no benefits. Headache location: holoacranial   Headache quality: pressure, pounding  Associated factors: photophobia, phonophobia  Intensity: 4-6/10  Headache chronicity: several years  Headache frequency: 3/week  Aggravating factors : bright lights, loud sounds   Relieving factors: tylenol, maxalt  Prophylactic medications: topamax  Abortive medications: maxalt, tylenol  Previously used medications: amitriptyline, aimovig, imitrex, fioricet, paxil, botox, nortriptyline    The patient has intermittent diplopia secondary to hydrocephalus syndrome which is chronic. The patient developed a mastoid infection at the age of 11.  In 2001, he had his first ventriculoperitoneal shunt. In the years following, he had four subsequent shunt revisions. The patient has had multiple MRIs over the years, showing stability of his ventricles and shunt placement. Testing reviewed:    MRI brain 5/22/20 no significant abnormalities demonstrated. Shunt tubing appears intact on shunt series. Cystic abnormality in the midline posterior to the third ventricle/midbrain with 2 posterior parietal shunt catheters with tips in the adjacent cyst.  Old right cerebellar infarct. No acute hemorrhage, midline shift, or hydrocephalus. -MRI brain 8/8/19   Maurizio Brown is been interval placement of a second left-sided shunt catheter with significant interval decompression of the ventricular system, especially on the left. The transependymal migration of CSF has lessened. The frontal horns are nearly slit-like.   There is no evidence of new bleed,   pathologic contrast enhancement or pathologic extra-axial fluid collection to explain the patient's headaches.]           PAST MEDICAL HISTORY:         Diagnosis Date    Acute bronchitis due to infection 12/05/2016    Back pain     Diplopia     Eczema     Headache(784.0)     Hearing loss     Hydrocephalus with operating shunt ([address]     Migraine 1994        PAST SURGICAL HISTORY:         Procedure Laterality Date    CHOLECYSTECTOMY, LAPAROSCOPIC  01/01/2001    MASTOIDECTOMY Right 01/01/2001    VENTRICULOPERITONEAL SHUNT      VENTRICULOPERITONEAL SHUNT  06/23/2014    REVISION        SOCIAL HISTORY:     Social History     Socioeconomic History    Marital status:      Spouse name: Not on file    Number of children: Not on file    Years of education: Not on file    Highest education level: Not on file   Occupational History    Not on file   Tobacco Use    Smoking status: Light Smoker     Packs/day: 0.50     Years: 0.50     Pack years: 0.25     Types: Cigarettes     Start date: 8/17/1991     Last attempt to quit: 9/3/2015 Years since quittin.2     Passive exposure: Never    Smokeless tobacco: Never    Tobacco comments:     Recently started about 2 months ago   Vaping Use    Vaping Use: Never used   Substance and Sexual Activity    Alcohol use: No    Drug use: No    Sexual activity: Not Currently     Partners: Female   Other Topics Concern    Not on file   Social History Narrative    Not on file     Social Determinants of Health     Financial Resource Strain: Low Risk     Difficulty of Paying Living Expenses: Not hard at all   Food Insecurity: No Food Insecurity    Worried About Running Out of Food in the Last Year: Never true    Ran Out of Food in the Last Year: Never true   Transportation Needs: Not on file   Physical Activity: Not on file   Stress: Not on file   Social Connections: Not on file   Intimate Partner Violence: Not on file   Housing Stability: Not on file       CURRENT MEDICATIONS:     Current Outpatient Medications   Medication Sig Dispense Refill    Atogepant (QULIPTA) 60 MG TABS Take 60 mg by mouth daily 30 tablet 5    topiramate (TOPAMAX) 100 MG tablet TAKE 1 TABLET BY MOUTH TWO TIMES A DAY 60 tablet 5    topiramate (TOPAMAX) 50 MG tablet TAKE 1 TABLET BY MOUTH TWO TIMES A DAY, along with 100 mg tablets for a total of 150 mg 2 times daily 60 tablet 5    tamsulosin (FLOMAX) 0.4 MG capsule Take 1 capsule by mouth in the morning. 30 capsule 0    vitamin D3 (CHOLECALCIFEROL) 25 MCG (1000 UT) TABS tablet Take 1 tablet by mouth daily (Patient not taking: Reported on 2022) 30 tablet 2     No current facility-administered medications for this visit. ALLERGIES:   No Known Allergies                              REVIEW OF SYSTEMS        All items selected indicate a positive finding. Those items not selected are negative.   Constitutional [] Weight loss/gain   [] Fatigue  [] Fever/Chills   HEENT [] Hearing Loss  [] Visual Disturbance  [] Tinnitus  [] Eye pain   Respiratory [] Shortness of Breath  [] Cough  [] Snoring   Cardiovascular [] Chest Pain  [] Palpitations  [] Lightheaded   GI [] Constipation  [] Diarrhea  [] Swallowing change  [] Nausea/vomiting    [] Urinary Frequency  [] Urinary Urgency   Musculoskeletal [] Neck pain  [] Back pain  [] Muscle pain  [] Restless legs   Dermatologic [] Skin changes   Neurologic [] Memory loss/confusion  [] Seizures  [] Trouble walking or imbalance  [] Dizziness  [] Sleep disturbance  [] Weakness  [] Numbness  [] Tremors  [] Speech Difficulty  [x] Headaches  [x] Light Sensitivity  [x] Sound Sensitivity   Endocrinology []Excessive thirst  []Excessive hunger   Psychiatric [] Anxiety/Depression  [] Hallucination   Allergy/immunology []Hives/environmental allergies   Hematologic/lymph [] Abnormal bleeding  [] Abnormal bruising         PHYSICAL EXAMINATION:       Vitals:    11/29/22 1433   BP: 135/78   Pulse: 82                                              .                                                                                                    General Appearance:  Alert, cooperative, no signs of distress, appears stated age   Head:  Normocephalic, no signs of trauma   Eyes:  Conjunctiva/corneas clear;  eyelids intact   Ears:  Normal external ear and canals   Nose: Nares normal, mucosa normal, no drainage    Throat: Lips and tongue normal; teeth normal;  gums normal   Neck: Supple, intact flexion, extension and rotation;   trachea midline;  no adenopathy;   thyroid: not enlarged;   no carotid pulse abnormality   Back:   Symmetric, no curvature, ROM adequate   Lungs:   Respirations unlabored   Heart:  Regular rate and rhythm           Extremities: Extremities normal, no cyanosis, no edema   Pulses: Symmetric over head and neck   Skin: Skin color, texture normal, no rashes, no lesions                                     NEUROLOGIC EXAMINATION    Neurologic Exam  Mental status    Alert and oriented x 3; intact memory with no confusion, speech or language problems; no hallucinations or delusions  Fund of information appropriate for level of education    Cranial nerves    II - visual fields intact to confrontation bilaterally  III, IV, VI - extra-ocular muscles full: no pupillary defect; no VERONICA, no nystagmus, no ptosis   V - normal facial sensation                                                               VII - normal facial symmetry                                                             VIII - intact hearing                                                                             IX, X - symmetrical palate                                                                  XI - symmetrical shoulder shrug                                                       XII - tongue midline without atrophy or fasciculation      Motor function  Normal muscle bulk and tone; strength 5/5 on all 4 extremities, no pronator drift      Sensory function Intact to light touch, pinprick, vibration, proprioception on all 4 extremities      Cerebellar Intact fine motor movement. No involuntary movements or tremors. No ataxia or dysmetria on finger to nose or heel to shin testing      Reflex function DTR 2+ on bilateral UE and LE, symmetric. Down going toes bilaterally      Gait                   normal base and arm swing                  Medical Decision Making: In summary, your patientZack exhibits the following, with associated plan:    Chronic tension type headaches  Continue to monitor   Intractable chronic migraine headaches. He is currently getting 3 headaches/week that are of a decreased intensity at 3-5 /10. The patient reports his headaches are easily aborted with tylenol. Previous medications include nortriptyline, amitriptyline, Aimovig , Botox, Topamax, Imitrex, Fioricet, and Botox injections.    Continue Topamax 150 mg twice daily  Start qulipta 60 mg daily for prophylaxis, I recommended he contact the office if he has any difficulties obtaining from the pharmacy Return for follow up in 6 months. Advised patient to call the office if migraines increase. Intermittent diplopia secondary to hydrocephalus syndrome which is chronic. The patient developed a mastoid infection at the age of 11. In 2001, he had his first ventriculoperitoneal shunt. In the years following, he had four subsequent shunt revisions. Signed: Obi Martin CNP      *Please note that portions of this note were completed with a voice recognition program.  Although every effort was made to insure the accuracy of this automated transcription, some errors in transcription may have occurred, occasionally words and are mis-transcribed    Provider Attestation: The documentation recorded by the scribe accurately reflects the service I personally performed and the decisions made by myself. Portions of this note were transcribed by a scribe. I personally performed the history, physical exam, and medical decision-making and confirm the accuracy of the information in the transcribed note. Scribe Attestation:   By signing my name below, Hilaria Dalal, attest that this documentation has been prepared under the direction and in the presence of Obi Martin CNP.

## 2022-11-30 ENCOUNTER — TELEPHONE (OUTPATIENT)
Dept: NEUROLOGY | Age: 37
End: 2022-11-30

## 2022-11-30 NOTE — TELEPHONE ENCOUNTER
Patient was seen in the clinic on 11/29/2022. It was suggested the patient start a new medication Qulipta. Patient called in this morning stating the pharmacy does not have the prescription for the [facility]. Please advise.

## 2022-12-01 RX ORDER — ATOGEPANT 60 MG/1
60 TABLET ORAL DAILY
Qty: 30 TABLET | Refills: 5 | Status: SHIPPED | OUTPATIENT
Start: 2022-12-01

## 2023-03-07 ENCOUNTER — HOSPITAL ENCOUNTER (EMERGENCY)
Age: 38
Discharge: HOME | End: 2023-03-07
Attending: EMERGENCY MEDICINE
Payer: MEDICAID

## 2023-03-07 ENCOUNTER — APPOINTMENT (OUTPATIENT)
Dept: GENERAL RADIOLOGY | Age: 38
End: 2023-03-07
Payer: MEDICAID

## 2023-03-07 VITALS
SYSTOLIC BLOOD PRESSURE: 124 MMHG | TEMPERATURE: 97.4 F | BODY MASS INDEX: 24.9 KG/M2 | WEIGHT: 194 LBS | HEIGHT: 74 IN | RESPIRATION RATE: 18 BRPM | DIASTOLIC BLOOD PRESSURE: 86 MMHG | HEART RATE: 100 BPM | OXYGEN SATURATION: 100 %

## 2023-03-07 DIAGNOSIS — S42.031A CLOSED DISPLACED FRACTURE OF ACROMIAL END OF RIGHT CLAVICLE, INITIAL ENCOUNTER: Primary | ICD-10-CM

## 2023-03-07 PROCEDURE — 99283 EMERGENCY DEPT VISIT LOW MDM: CPT

## 2023-03-07 PROCEDURE — 73030 X-RAY EXAM OF SHOULDER: CPT

## 2023-03-07 PROCEDURE — 6370000000 HC RX 637 (ALT 250 FOR IP): Performed by: PHYSICIAN ASSISTANT

## 2023-03-07 RX ORDER — HYDROCODONE BITARTRATE AND ACETAMINOPHEN 5; 325 MG/1; MG/1
1 TABLET ORAL ONCE
Status: COMPLETED | OUTPATIENT
Start: 2023-03-07 | End: 2023-03-07

## 2023-03-07 RX ORDER — HYDROCODONE BITARTRATE AND ACETAMINOPHEN 5; 325 MG/1; MG/1
1 TABLET ORAL EVERY 6 HOURS PRN
Qty: 10 TABLET | Refills: 0 | Status: SHIPPED | OUTPATIENT
Start: 2023-03-07 | End: 2023-03-10

## 2023-03-07 RX ADMIN — HYDROCODONE BITARTRATE AND ACETAMINOPHEN 1 TABLET: 5; 325 TABLET ORAL at 16:02

## 2023-03-07 ASSESSMENT — PAIN - FUNCTIONAL ASSESSMENT: PAIN_FUNCTIONAL_ASSESSMENT: 0-10

## 2023-03-07 ASSESSMENT — PAIN DESCRIPTION - LOCATION: LOCATION: SHOULDER

## 2023-03-07 ASSESSMENT — LIFESTYLE VARIABLES
HOW OFTEN DO YOU HAVE A DRINK CONTAINING ALCOHOL: NEVER
HOW MANY STANDARD DRINKS CONTAINING ALCOHOL DO YOU HAVE ON A TYPICAL DAY: PATIENT DOES NOT DRINK

## 2023-03-07 ASSESSMENT — PAIN SCALES - GENERAL: PAINLEVEL_OUTOF10: 8

## 2023-03-07 ASSESSMENT — PAIN DESCRIPTION - ORIENTATION: ORIENTATION: RIGHT

## 2023-03-07 NOTE — ED PROVIDER NOTES
[address] ED  eMERGENCY dEPARTMENT eNCOUnter      Pt Name: Ray العلي  MRN: [id number]  [place] 1985  Date of evaluation: 3/7/2023  Provider: Rosina Tinsley PA-C    CHIEF COMPLAINT       Chief Complaint   Patient presents with    Shoulder Injury     right           HISTORY OF PRESENT ILLNESS  (Location/Symptom, Timing/Onset, Context/Setting, Quality, Duration, Modifying Factors, Severity.)   Ray العلي is a 44 y.o. male who presents to the emergency department for evaluation of right shoulder pain. Pt states he tripped over an uneven sidewalk and fell onto right arm. Pt reports a lump over right shoulder with 8/10 pain and decreased range of motion. He denies numbness. No head injury, neck pain, back pain. He is right handed. No other complaints. Nursing Notes were reviewed. REVIEW OF SYSTEMS    (2-9 systems for level 4, 10 or more for level 5)     Review of Systems   Shoulder pain   Fall       Except as noted above the remainder of the review of systems was reviewed and negative.        PAST MEDICAL HISTORY     Past Medical History:   Diagnosis Date    Acute bronchitis due to infection 12/05/2016    Back pain     Diplopia     Eczema     Headache(784.0)     Hearing loss     Hydrocephalus with operating shunt ([facility] [address]     Migraine 1994     None otherwise stated in nurses notes    SURGICAL HISTORY       Past Surgical History:   Procedure Laterality Date    CHOLECYSTECTOMY, LAPAROSCOPIC  01/01/2001    MASTOIDECTOMY Right 01/01/2001    VENTRICULOPERITONEAL SHUNT      VENTRICULOPERITONEAL SHUNT  06/23/2014    REVISION     None otherwise stated in nurses notes    CURRENT MEDICATIONS       Discharge Medication List as of 3/7/2023  4:58 PM        CONTINUE these medications which have NOT CHANGED    Details   vitamin D3 (CHOLECALCIFEROL) 25 MCG (1000 UT) TABS tablet Take 1 tablet by mouth daily, Disp-30 tablet, R-4Normal      Atogepant (QULIPTA) 60 MG TABS Take 60 mg by mouth daily, Disp-30 tablet, R-5Normal      !! topiramate (TOPAMAX) 100 MG tablet TAKE 1 TABLET BY MOUTH TWO TIMES A DAY, Disp-60 tablet, R-5Normal      !! topiramate (TOPAMAX) 50 MG tablet TAKE 1 TABLET BY MOUTH TWO TIMES A DAY, along with 100 mg tablets for a total of 150 mg 2 times daily, Disp-60 tablet, R-5Normal       !! - Potential duplicate medications found. Please discuss with provider. ALLERGIES     Patient has no known allergies. FAMILY HISTORY           Problem Relation Age of Onset    Migraines Mother     Heart Disease Father     Heart Disease Maternal Grandmother     Diabetes Maternal Grandmother     Hypertension Maternal Grandmother     Migraines Maternal Grandmother     Heart Disease Maternal Grandfather     Diabetes Maternal Grandfather     Hypertension Maternal Grandfather     Migraines Maternal Grandfather     Stroke Maternal Grandfather      Family Status   Relation Name Status    Mother Mother (Not Specified)    Father  (Not Specified)    MGM Grandmother (Not Specified)    MGF Grandfather (Not Specified)      None otherwise stated in nurses notes    SOCIAL HISTORY      reports that he has been smoking cigarettes. He started smoking about 31 years ago. He has a 0.25 pack-year smoking history. He has never been exposed to tobacco smoke. He has never used smokeless tobacco. He reports that he does not drink alcohol and does not use drugs. lives at home with others     PHYSICAL EXAM    (up to 7 for level 4, 8 or more for level 5)     ED Triage Vitals [03/07/23 1535]   BP Temp Temp Source Heart Rate Resp SpO2 Height Weight   124/86 97.4 °F (36.3 °C) Oral 100 18 100 % 6' 2\" (1.88 m) 194 lb (88 kg)       Physical Exam   Nursing note and vitals reviewed. Constitutional: Oriented to person, place, and time and well-developed, well-nourished. Head: Normocephalic and atraumatic. Ear: External ears normal.   Nose: Nose normal and midline.    Eyes: Conjunctivae and EOM are normal. Pupils are equal, round, and reactive to light. Neck: Normal range of motion. Neck supple. Cardiovascular: Normal rate, regular rhythm, normal heart sounds and intact distal pulses. Pulmonary/Chest: Effort normal and breath sounds normal. No respiratory   Musculoskeletal: examination of right shoulder reveals deformity over the [facility] joint. No skin tenting. Tenderness over the ac joint. DROM of arm with abduction. Arm is held against side. Good  strength. 2/2 radial pulse. Brisk cap refill. Distal sensation intact. Neurological: Alert and oriented to person, place, and time. GCS score is 15. Skin: Skin is warm and dry. No rash noted. No erythema. No pallor. Psychiatric: Mood, memory, affect and judgment normal.           DIAGNOSTIC RESULTS     EKG: All EKG's are interpreted by the Emergency Department Physician who either signs or Co-signs this chart in the absence of a cardiologist.        RADIOLOGY:   All plain film, CT, MRI, and formal ultrasound images (except ED bedside ultrasound) are read by the radiologist, see reports below, unless otherwise noted in MDM or here. XR SHOULDER RIGHT (MIN 2 VIEWS)   Preliminary Result   Acute displaced mid to distal right clavicular fracture measuring   approximately 2 cm of displacement. AC and coracoclavicular intervals are   otherwise maintained. No results found. LABS:  Labs Reviewed - No data to display    All other labs were within normal range or not returned as of this dictation.     EMERGENCY DEPARTMENT COURSE and DIFFERENTIAL DIAGNOSIS/MDM:   Vitals:    Vitals:    03/07/23 1535   BP: 124/86   Pulse: 100   Resp: 18   Temp: 97.4 °F (36.3 °C)   TempSrc: Oral   SpO2: 100%   Weight: 194 lb (88 kg)   Height: 6' 2\" (1.88 m)         Patient instructed to return to the emergency room if symptoms worsen, return, or any other concern right away which is agreed by the patient    ED MEDS:  Orders Placed This Encounter   Medications    HYDROcodone-acetaminophen (NORCO) 5-325 MG per tablet 1 tablet    HYDROcodone-acetaminophen (NORCO) 5-325 MG per tablet     Sig: Take 1 tablet by mouth every 6 hours as needed for Pain for up to 3 days. Intended supply: 3 days. Take lowest dose possible to manage pain Max Daily Amount: 4 tablets     Dispense:  10 tablet     Refill:  0         CONSULTS:  None    PROCEDURES:  None      FINAL IMPRESSION      1. Closed displaced fracture of acromial end of right clavicle, initial encounter          DISPOSITION/PLAN   DISPOSITION Decision To Discharge    PATIENT REFERRED TO:  Chanel Quintero, APRN - CNP  [address] [facility] [address]  [place] [id number]  [phone]          [facility] ED  [address] [address]  [phone]        Victorino Guzman MD  [address] [place] [address]  [phone]          Victorino Guzman MD  [address] [place] [address]  [phone]          DISCHARGE MEDICATIONS:  Discharge Medication List as of 3/7/2023  4:58 PM        START taking these medications    Details   HYDROcodone-acetaminophen (NORCO) 5-325 MG per tablet Take 1 tablet by mouth every 6 hours as needed for Pain for up to 3 days. Intended supply: 3 days. Take lowest dose possible to manage pain Max Daily Amount: 4 tablets, Disp-10 tablet, R-0Normal               Summation      Patient Course:    Fall. Injury to right shoulder.   Deformity noted over ac joint with no skin tenting.   Pulses, sensation intact.  DROM.     Xray shows displaced clavicular fracture.   Sling.  Pain medications.   Pt referred to orthopedics.   Recommend they call tomorrow morning.   Discussed results and plan with the pt.  They expressed appropriate understanding.  Pt given close follow up, supportive care instructions and strict return instructions at the bedside.        The care is provided during an unprecedented national emergency due to the novel coronavirus, COVID-19.      ED Medications administered this visit:   Medications   HYDROcodone-acetaminophen (NORCO) 5-325 MG per tablet 1 tablet (1 tablet Oral Given 3/7/23 1602)       New Prescriptions from this visit:    Discharge Medication List as of 3/7/2023  4:58 PM        START taking these medications    Details   HYDROcodone-acetaminophen (NORCO) 5-325 MG per tablet Take 1 tablet by mouth every 6 hours as needed for Pain for up to 3 days. Intended supply: 3 days. Take lowest dose possible to manage pain Max Daily Amount: 4 tablets, Disp-10 tablet, R-0Normal             Follow-up:  JAMAR Polanco CNP  [address] [facility] [address] [place] [address] [address] [place] [address] ED  [facility] [address]  [address] [place] [address], [facility] [address] [place] [address]  [address] [place] [address], [address] [place] [address]  [phone]            Final Impression:   1.  Closed displaced fracture of acromial end of right clavicle, initial encounter               (Please note that portions of this note were completed with a voice recognition program )        MARIA LUISA Bennett PA-C  03/07/23 1688

## 2023-03-08 ENCOUNTER — TELEPHONE (OUTPATIENT)
Dept: ORTHOPEDIC SURGERY | Age: 38
End: 2023-03-08

## 2023-03-08 NOTE — TELEPHONE ENCOUNTER
Pt called in to schedule an appointment for his right clavicle fx.  After approval from Dr. Akash Jorgensen, pt was scheduled on 3/10 at 8 am.

## 2023-03-10 ENCOUNTER — OFFICE VISIT (OUTPATIENT)
Dept: ORTHOPEDIC SURGERY | Age: 38
End: 2023-03-10

## 2023-03-10 VITALS — WEIGHT: 194 LBS | RESPIRATION RATE: 16 BRPM | HEIGHT: 74 IN | BODY MASS INDEX: 24.9 KG/M2

## 2023-03-10 DIAGNOSIS — Z01.818 PREOP TESTING: Primary | ICD-10-CM

## 2023-03-10 DIAGNOSIS — S42.031A CLOSED DISPLACED FRACTURE OF ACROMIAL END OF RIGHT CLAVICLE, INITIAL ENCOUNTER: ICD-10-CM

## 2023-03-10 NOTE — PROGRESS NOTES
ORTHOPEDIC PATIENT EVALUATION      HPI / Chief Complaint  Heidi Yeager is a 44 y.o. right-hand-dominant male who presents for evaluation of his right shoulder. 2 days ago on 3/7/2023 he indicates that he tripped over a raised sidewalk landing on his right shoulder. He had immediate pain in the deformity and so was seen in the emergency department where he was diagnosed with a clavicle fracture. He was placed in a sling and presents today for further evaluation and treatment. His pain remains localized to the shoulder region. No associated numbness or tingling. Past Medical History  Saige Finnegan  has a past medical history of Acute bronchitis due to infection, Back pain, Diplopia, Eczema, Headache(784.0), Hearing loss, Hydrocephalus with operating shunt ([facility] [address] and Migraine. Past Surgical History  Saige Finnegan  has a past surgical history that includes Ventriculoperitoneal shunt; Cholecystectomy, laparoscopic (01/01/2001); mastoidectomy (Right, 01/01/2001); and Ventriculoperitoneal shunt (06/23/2014). Current Medications  Current Outpatient Medications   Medication Sig Dispense Refill    HYDROcodone-acetaminophen (NORCO) 5-325 MG per tablet Take 1 tablet by mouth every 6 hours as needed for Pain for up to 3 days. Intended supply: 3 days. Take lowest dose possible to manage pain Max Daily Amount: 4 tablets 10 tablet 0    vitamin D3 (CHOLECALCIFEROL) 25 MCG (1000 UT) TABS tablet Take 1 tablet by mouth daily 30 tablet 4    Atogepant (QULIPTA) 60 MG TABS Take 60 mg by mouth daily 30 tablet 5    topiramate (TOPAMAX) 100 MG tablet TAKE 1 TABLET BY MOUTH TWO TIMES A DAY 60 tablet 5    topiramate (TOPAMAX) 50 MG tablet TAKE 1 TABLET BY MOUTH TWO TIMES A DAY, along with 100 mg tablets for a total of 150 mg 2 times daily 60 tablet 5     No current facility-administered medications for this visit. Allergies  Allergies have been reviewed. Saige Finnegan has No Known Allergies.     Social History  Saige Finnegan  reports that he has been smoking cigarettes. He started smoking about 31 years ago. He has a 0.25 pack-year smoking history. He has never been exposed to tobacco smoke. He has never used smokeless tobacco. He reports that he does not drink alcohol and does not use drugs. Family History  Marcin's family history includes Diabetes in his maternal grandfather and maternal grandmother; Heart Disease in his father, maternal grandfather, and maternal grandmother; Hypertension in his maternal grandfather and maternal grandmother; Migraines in his maternal grandfather, maternal grandmother, and mother; Stroke in his maternal grandfather. Review of Systems   History obtained from the patient. REVIEW OF SYSTEMS:   Constitution: negative for fever, chills, weight loss or malaise   Musculoskeletal: As noted in the HPI   Neurologic: As noted in the HPI    Physical Exam  Resp 16   Ht 6' 2\" (1.88 m)   Wt 194 lb (88 kg)   BMI 24.91 kg/m²    General Appearance: alert, well appearing, and in no distress  Mental Status: alert, oriented to person, place, and time  Evaluation of the patient's right shoulder and upper extremity demonstrates intact skin without warmth erythema but he does have mild to moderate swelling. There is prominence of the distal clavicle. He is tender to palpation at this location. Active and passive range of motion through the shoulder is limited due to pain. He has a 2+ radial pulse with brisk cap refill in his fingers and sensation is grossly intact light touch in all dermatomes. Imaging Studies  X-rays of the right shoulder completed on 3/7/2023 reviewed independently demonstrating a distal clavicle fracture with significant displacement. Assessment and Plan  Helga Benitez is a 44 y.o. old male with a closed right distal clavicle fracture. I did have a discussion with the patient today educating him about the nature and extent of his injury and discussed treatment options available to him.   Given the nature of this injury I believe he would benefit from surgical intervention however an open reduction internal fixation. We discussed the details of the procedure, risks and benefits of surgery, expected outcome and postoperative recovery course. Risks as discussed included but not limited to risk of infection, wound healing problems, neurovascular injury, stiffness, delayed union, nonunion, implant failure and related problems, and risk of anesthesia. He demonstrated a good understanding of our discussion and would like to proceed with surgery. All questions were answered. We will schedule him for surgery in a timely fashion and facilitate him getting appropriate preoperative medical clearance. Preoperative labs were ordered. This note is created with the assistance of a speech recognition program.  While intending to generate adocument that actually reflects the content of the visit, the document can still have some errors including those of syntax and sound a like substitutions which may escape proof reading. It such instances, actual meaningcan be extrapolated by contextual diversion.

## 2023-03-14 ENCOUNTER — OFFICE VISIT (OUTPATIENT)
Dept: NEUROLOGY | Age: 38
End: 2023-03-14
Payer: MEDICAID

## 2023-03-14 VITALS
HEART RATE: 66 BPM | BODY MASS INDEX: 26.82 KG/M2 | SYSTOLIC BLOOD PRESSURE: 121 MMHG | DIASTOLIC BLOOD PRESSURE: 82 MMHG | HEIGHT: 74 IN | WEIGHT: 209 LBS

## 2023-03-14 DIAGNOSIS — G43.711 INTRACTABLE CHRONIC MIGRAINE WITHOUT AURA AND WITH STATUS MIGRAINOSUS: ICD-10-CM

## 2023-03-14 DIAGNOSIS — G91.8 CHRONIC BRAIN-HYDROCEPHALUS SYNDROME (HCC): Primary | ICD-10-CM

## 2023-03-14 DIAGNOSIS — G91.9 HYDROCEPHALUS WITH OPERATING SHUNT (HCC): ICD-10-CM

## 2023-03-14 PROCEDURE — 99214 OFFICE O/P EST MOD 30 MIN: CPT | Performed by: NURSE PRACTITIONER

## 2023-03-14 PROCEDURE — G8427 DOCREV CUR MEDS BY ELIG CLIN: HCPCS | Performed by: NURSE PRACTITIONER

## 2023-03-14 PROCEDURE — 4004F PT TOBACCO SCREEN RCVD TLK: CPT | Performed by: NURSE PRACTITIONER

## 2023-03-14 PROCEDURE — G8417 CALC BMI ABV UP PARAM F/U: HCPCS | Performed by: NURSE PRACTITIONER

## 2023-03-14 PROCEDURE — G8484 FLU IMMUNIZE NO ADMIN: HCPCS | Performed by: NURSE PRACTITIONER

## 2023-03-14 RX ORDER — TOPIRAMATE 100 MG/1
TABLET, FILM COATED ORAL
Qty: 60 TABLET | Refills: 5 | Status: SHIPPED | OUTPATIENT
Start: 2023-03-14

## 2023-03-14 RX ORDER — TOPIRAMATE 50 MG/1
TABLET, FILM COATED ORAL
Qty: 60 TABLET | Refills: 5 | Status: SHIPPED | OUTPATIENT
Start: 2023-03-14

## 2023-03-14 NOTE — PROGRESS NOTES
[facility]            [address]          [place], [address] [facility]          Dept: [phone]          Dept Fax: [phone]    MD Edvin Bacon MD Delora Luke, MD Melvina Huron, CNP            3/14/2023      HISTORY OF PRESENT ILLNESS:       I had the pleasure of seeing Jen Gill, who returns for continuing neurologic care. The patient was seen last on November 29, 2022 for treatment of chronic tension type headaches, intractable chronic migraine headaches and intermittent diplopia. The patient has chronic tension type headaches and was previously prescribed nortriptyline. For migraine prophylaxis he was prescribed topamax 150 mg twice daily and qulipta 60 mg daily. He has noticed improvement in his headaches with the use of qulipta. He recently fell and fractured his clavicle, notes he also had head trauma during the fall. He notes the head trauma was minor. He is now having 2 headaches/week which are 3/10 in intensity and are easily aborted with use of tylenol. His headaches can be present upon awaking. He is unsure if he snores, denies restlessness while sleeping, and feels rested upon awaking. Headache location: holoacranial   Headache quality: pressure, pounding  Associated factors: photophobia, phonophobia  Intensity: 4-6/10  Headache chronicity: several years  Headache frequency: 2/week  Aggravating factors : bright lights, loud sounds   Relieving factors: tylenol, maxalt  Prophylactic medications: topamax  Abortive medications: tylenol  Previously used medications: amitriptyline, aimovig, imitrex, fioricet, paxil, botox, nortriptyline, maxalt    The patient has intermittent diplopia secondary to hydrocephalus syndrome which is chronic. The patient developed a mastoid infection at the age of 11. In 2001, he had his first ventriculoperitoneal shunt.  In the years following, he had four subsequent shunt revisions. The patient has had multiple MRIs over the years, showing stability of his ventricles and shunt placement. Testing reviewed:    MRI brain 5/22/20 no significant abnormalities demonstrated. Shunt tubing appears intact on shunt series. Cystic abnormality in the midline posterior to the third ventricle/midbrain with 2 posterior parietal shunt catheters with tips in the adjacent cyst.  Old right cerebellar infarct. No acute hemorrhage, midline shift, or hydrocephalus. -MRI brain 8/8/19   Ness Brow is been interval placement of a second left-sided shunt catheter with significant interval decompression of the ventricular system, especially on the left. The transependymal migration of CSF has lessened. The frontal horns are nearly slit-like.   There is no evidence of new bleed,   pathologic contrast enhancement or pathologic extra-axial fluid collection to explain the patient's headaches.]           PAST MEDICAL HISTORY:         Diagnosis Date    Acute bronchitis due to infection 12/05/2016    Back pain     Diplopia     Eczema     Headache(784.0)     Hearing loss     Hydrocephalus with operating shunt ([address]     Migraine 1994        PAST SURGICAL HISTORY:         Procedure Laterality Date    CHOLECYSTECTOMY, LAPAROSCOPIC  01/01/2001    MASTOIDECTOMY Right 01/01/2001    VENTRICULOPERITONEAL SHUNT      VENTRICULOPERITONEAL SHUNT  06/23/2014    REVISION        SOCIAL HISTORY:     Social History     Socioeconomic History    Marital status:      Spouse name: Not on file    Number of children: Not on file    Years of education: Not on file    Highest education level: Not on file   Occupational History    Not on file   Tobacco Use    Smoking status: Light Smoker     Packs/day: 0.50     Years: 0.50     Pack years: 0.25     Types: Cigarettes     Start date: 8/17/1991     Passive exposure: Never    Smokeless tobacco: Never    Tobacco comments:     Recently started about 2 months ago   Vaping Use    Vaping Use: Never used   Substance and Sexual Activity    Alcohol use: No    Drug use: No    Sexual activity: Not Currently     Partners: Female   Other Topics Concern    Not on file   Social History Narrative    Not on file     Social Determinants of Health     Financial Resource Strain: Low Risk     Difficulty of Paying Living Expenses: Not hard at all   Food Insecurity: No Food Insecurity    Worried About [address] in the Last Year: Never true    [address] in the Last Year: Never true   Transportation Needs: Unknown    Lack of Transportation (Medical): Not on file    Lack of Transportation (Non-Medical): No   Physical Activity: Not on file   Stress: Not on file   Social Connections: Not on file   Intimate Partner Violence: Not on file   Housing Stability: Unknown    Unable to Pay for Housing in the Last Year: Not on file    Number of Places Lived in the Last Year: Not on file    Unstable Housing in the Last Year: No       CURRENT MEDICATIONS:     Current Outpatient Medications   Medication Sig Dispense Refill    topiramate (TOPAMAX) 100 MG tablet TAKE 1 TABLET BY MOUTH TWO TIMES A DAY 60 tablet 5    topiramate (TOPAMAX) 50 MG tablet TAKE 1 TABLET BY MOUTH TWO TIMES A DAY, along with 100 mg tablets for a total of 150 mg 2 times daily 60 tablet 5    vitamin D3 (CHOLECALCIFEROL) 25 MCG (1000 UT) TABS tablet Take 1 tablet by mouth daily 30 tablet 4    Atogepant (QULIPTA) 60 MG TABS Take 60 mg by mouth daily 30 tablet 5     No current facility-administered medications for this visit. ALLERGIES:   No Known Allergies                              REVIEW OF SYSTEMS        All items selected indicate a positive finding. Those items not selected are negative.   Constitutional [] Weight loss/gain   [] Fatigue  [] Fever/Chills   HEENT [] Hearing Loss  [] Visual Disturbance  [] Tinnitus  [] Eye pain   Respiratory [] Shortness of Breath  [] Cough  [] Snoring   Cardiovascular [] Chest Pain  [] Palpitations  [] Lightheaded   GI [] Constipation  [] Diarrhea  [] Swallowing change  [] Nausea/vomiting    [] Urinary Frequency  [] Urinary Urgency   Musculoskeletal [] Neck pain  [] Back pain  [] Muscle pain  [] Restless legs   Dermatologic [] Skin changes   Neurologic [] Memory loss/confusion  [] Seizures  [] Trouble walking or imbalance  [] Dizziness  [] Sleep disturbance  [] Weakness  [] Numbness  [] Tremors  [] Speech Difficulty  [x] Headaches  [x] Light Sensitivity  [x] Sound Sensitivity   Endocrinology []Excessive thirst  []Excessive hunger   Psychiatric [] Anxiety/Depression  [] Hallucination   Allergy/immunology []Hives/environmental allergies   Hematologic/lymph [] Abnormal bleeding  [] Abnormal bruising         PHYSICAL EXAMINATION:       Vitals:    03/14/23 0756   BP: 121/82   Pulse: 66                                              .                                                                                                    General Appearance:  Alert, cooperative, no signs of distress, appears stated age   Head:  Normocephalic, no signs of trauma   Eyes:  Conjunctiva/corneas clear;  eyelids intact   Ears:  Normal external ear and canals   Nose: Nares normal, mucosa normal, no drainage    Throat: Lips and tongue normal; teeth normal;  gums normal   Neck: Supple, intact flexion, extension and rotation;   trachea midline;  no adenopathy;   thyroid: not enlarged;   no carotid pulse abnormality   Back:   Symmetric, no curvature, ROM adequate   Lungs:   Respirations unlabored   Heart:  Regular rate and rhythm           Extremities: Extremities normal, no cyanosis, no edema   Pulses: Symmetric over head and neck   Skin: Skin color, texture normal, no rashes, no lesions                                     NEUROLOGIC EXAMINATION    Neurologic Exam  Mental status    Alert and oriented x 3; intact memory with no confusion, speech or language problems; no hallucinations or delusions  Fund of information appropriate for level of education    Cranial nerves    II - visual fields intact to confrontation bilaterally  III, IV, VI - extra-ocular muscles full: no pupillary defect; no VERONICA, no nystagmus, no ptosis   V - normal facial sensation                                                               VII - normal facial symmetry                                                             VIII - intact hearing                                                                             IX, X - symmetrical palate                                                                  XI - symmetrical shoulder shrug                                                       XII - tongue midline without atrophy or fasciculation      Motor function  Normal muscle bulk and tone; strength 5/5 on all 4 extremities, no pronator drift      Sensory function Intact to light touch, pinprick, vibration, proprioception on all 4 extremities      Cerebellar Intact fine motor movement. No involuntary movements or tremors. No ataxia or dysmetria on finger to nose or heel to shin testing      Reflex function DTR 2+ on bilateral UE and LE, symmetric. Down going toes bilaterally      Gait                   normal base and arm swing                  Medical Decision Making: In summary, your patient, Brooklynn Childs exhibits the following, with associated plan:    Chronic tension type headaches  Continue to monitor   Intractable chronic migraine headaches. He is currently getting 2 headaches/week that are of a decreased intensity at 3-5 /10. The patient reports his headaches are easily aborted with tylenol. Previous medications include nortriptyline, amitriptyline, Aimovig , Botox, Topamax, Imitrex, Fioricet, and Botox injections. Continue Topamax 150 mg twice daily  Continue qulipta 60 mg daily for prophylaxis  Return for follow up in 3 months with Dr. Marcelle Castañeda  Intermittent diplopia secondary to hydrocephalus syndrome which is chronic.   The patient developed a mastoid infection at the age of 11. In 2001, he had his first ventriculoperitoneal shunt. In the years following, he had four subsequent shunt revisions. Signed: Ann Ganser, CNP      *Please note that portions of this note were completed with a voice recognition program.  Although every effort was made to insure the accuracy of this automated transcription, some errors in transcription may have occurred, occasionally words and are mis-transcribed    Provider Attestation: The documentation recorded by the scribe accurately reflects the service I personally performed and the decisions made by myself. Portions of this note were transcribed by a scribe. I personally performed the history, physical exam, and medical decision-making and confirm the accuracy of the information in the transcribed note. Scribe Attestation:   By signing my name below, Nina Navarro, attest that this documentation has been prepared under the direction and in the presence of Ann Ganser, CNP.

## 2023-03-15 ENCOUNTER — HOSPITAL ENCOUNTER (OUTPATIENT)
Age: 38
Setting detail: SPECIMEN
Discharge: HOME | End: 2023-03-15

## 2023-03-15 DIAGNOSIS — R79.89 LOW SERUM R PROTEIN: ICD-10-CM

## 2023-03-15 DIAGNOSIS — S42.031A CLOSED DISPLACED FRACTURE OF ACROMIAL END OF RIGHT CLAVICLE, INITIAL ENCOUNTER: ICD-10-CM

## 2023-03-15 DIAGNOSIS — Z01.818 PREOP TESTING: ICD-10-CM

## 2023-03-15 LAB
ANION GAP SERPL CALCULATED.3IONS-SCNC: 8 MMOL/L (ref 9–17)
BUN SERPL-MCNC: 10 MG/DL (ref 6–20)
CALCIUM SERPL-MCNC: 9.4 MG/DL (ref 8.6–10.4)
CHLORIDE SERPL-SCNC: 108 MMOL/L (ref 98–107)
CO2 SERPL-SCNC: 25 MMOL/L (ref 20–31)
CREAT SERPL-MCNC: 0.92 MG/DL (ref 0.7–1.2)
GFR SERPL CREATININE-BSD FRML MDRD: >60 ML/MIN/1.73M2
GLUCOSE SERPL-MCNC: 75 MG/DL (ref 70–99)
HCT VFR BLD AUTO: 46.9 % (ref 40.7–50.3)
HGB BLD-MCNC: 14.9 G/DL (ref 13–17)
MCH RBC QN AUTO: 31.7 PG (ref 25.2–33.5)
MCHC RBC AUTO-ENTMCNC: 31.8 G/DL (ref 28.4–34.8)
MCV RBC AUTO: 99.8 FL (ref 82.6–102.9)
NRBC AUTOMATED: 0 PER 100 WBC
PDW BLD-RTO: 13.3 % (ref 11.8–14.4)
PLATELET # BLD AUTO: 236 K/UL (ref 138–453)
PMV BLD AUTO: 11.5 FL (ref 8.1–13.5)
POTASSIUM SERPL-SCNC: 4.5 MMOL/L (ref 3.7–5.3)
RBC # BLD: 4.7 M/UL (ref 4.21–5.77)
SODIUM SERPL-SCNC: 141 MMOL/L (ref 135–144)
WBC # BLD AUTO: 8.8 K/UL (ref 3.5–11.3)

## 2023-03-16 LAB — PROT SERPL-MCNC: 6.9 G/DL (ref 6.4–8.3)

## 2023-03-17 RX ORDER — ACETAMINOPHEN 325 MG/1
1000 TABLET ORAL ONCE
Status: CANCELLED | OUTPATIENT
Start: 2023-03-17 | End: 2023-03-17

## 2023-03-17 RX ORDER — SODIUM CHLORIDE 9 MG/ML
INJECTION, SOLUTION INTRAVENOUS PRN
Status: CANCELLED | OUTPATIENT
Start: 2023-03-17

## 2023-03-17 RX ORDER — SODIUM CHLORIDE 0.9 % (FLUSH) 0.9 %
5-40 SYRINGE (ML) INJECTION EVERY 12 HOURS SCHEDULED
Status: CANCELLED | OUTPATIENT
Start: 2023-03-17

## 2023-03-17 RX ORDER — SODIUM CHLORIDE 0.9 % (FLUSH) 0.9 %
5-40 SYRINGE (ML) INJECTION PRN
Status: CANCELLED | OUTPATIENT
Start: 2023-03-17

## 2023-03-20 ENCOUNTER — ANESTHESIA EVENT (OUTPATIENT)
Dept: OPERATING ROOM | Age: 38
End: 2023-03-20
Payer: MEDICAID

## 2023-03-21 ENCOUNTER — ANESTHESIA (OUTPATIENT)
Dept: OPERATING ROOM | Age: 38
End: 2023-03-21
Payer: MEDICAID

## 2023-03-21 ENCOUNTER — APPOINTMENT (OUTPATIENT)
Dept: GENERAL RADIOLOGY | Age: 38
End: 2023-03-21
Attending: ORTHOPAEDIC SURGERY
Payer: MEDICAID

## 2023-03-21 ENCOUNTER — HOSPITAL ENCOUNTER (OUTPATIENT)
Age: 38
Setting detail: OUTPATIENT SURGERY
Discharge: HOME | End: 2023-03-21
Attending: ORTHOPAEDIC SURGERY | Admitting: ORTHOPAEDIC SURGERY
Payer: MEDICAID

## 2023-03-21 VITALS
TEMPERATURE: 98.2 F | BODY MASS INDEX: 26.56 KG/M2 | WEIGHT: 207 LBS | SYSTOLIC BLOOD PRESSURE: 143 MMHG | OXYGEN SATURATION: 95 % | HEIGHT: 74 IN | DIASTOLIC BLOOD PRESSURE: 88 MMHG | HEART RATE: 80 BPM | RESPIRATION RATE: 13 BRPM

## 2023-03-21 DIAGNOSIS — S42.031A TRAUMATIC CLOSED FRACTURE OF DISTAL CLAVICLE WITH MINIMAL DISPLACEMENT, RIGHT, INITIAL ENCOUNTER: Primary | ICD-10-CM

## 2023-03-21 PROCEDURE — 7100000000 HC PACU RECOVERY - FIRST 15 MIN: Performed by: ORTHOPAEDIC SURGERY

## 2023-03-21 PROCEDURE — 2720000010 HC SURG SUPPLY STERILE: Performed by: ORTHOPAEDIC SURGERY

## 2023-03-21 PROCEDURE — 3600000004 HC SURGERY LEVEL 4 BASE: Performed by: ORTHOPAEDIC SURGERY

## 2023-03-21 PROCEDURE — 7100000010 HC PHASE II RECOVERY - FIRST 15 MIN: Performed by: ORTHOPAEDIC SURGERY

## 2023-03-21 PROCEDURE — 7100000001 HC PACU RECOVERY - ADDTL 15 MIN: Performed by: ORTHOPAEDIC SURGERY

## 2023-03-21 PROCEDURE — 6360000002 HC RX W HCPCS: Performed by: NURSE ANESTHETIST, CERTIFIED REGISTERED

## 2023-03-21 PROCEDURE — 2500000003 HC RX 250 WO HCPCS: Performed by: NURSE ANESTHETIST, CERTIFIED REGISTERED

## 2023-03-21 PROCEDURE — 6360000002 HC RX W HCPCS

## 2023-03-21 PROCEDURE — 3209999900 FLUORO FOR SURGICAL PROCEDURES

## 2023-03-21 PROCEDURE — 2709999900 HC NON-CHARGEABLE SUPPLY: Performed by: ORTHOPAEDIC SURGERY

## 2023-03-21 PROCEDURE — 2580000003 HC RX 258: Performed by: ANESTHESIOLOGY

## 2023-03-21 PROCEDURE — 3600000014 HC SURGERY LEVEL 4 ADDTL 15MIN: Performed by: ORTHOPAEDIC SURGERY

## 2023-03-21 PROCEDURE — C1713 ANCHOR/SCREW BN/BN,TIS/BN: HCPCS | Performed by: ORTHOPAEDIC SURGERY

## 2023-03-21 PROCEDURE — 2500000003 HC RX 250 WO HCPCS: Performed by: ORTHOPAEDIC SURGERY

## 2023-03-21 PROCEDURE — 2500000003 HC RX 250 WO HCPCS

## 2023-03-21 PROCEDURE — 6370000000 HC RX 637 (ALT 250 FOR IP)

## 2023-03-21 PROCEDURE — 7100000011 HC PHASE II RECOVERY - ADDTL 15 MIN: Performed by: ORTHOPAEDIC SURGERY

## 2023-03-21 PROCEDURE — 3700000000 HC ANESTHESIA ATTENDED CARE: Performed by: ORTHOPAEDIC SURGERY

## 2023-03-21 PROCEDURE — 3700000001 HC ADD 15 MINUTES (ANESTHESIA): Performed by: ORTHOPAEDIC SURGERY

## 2023-03-21 DEVICE — SCREW BNE L18MM DIA2.7MM CORT ANK S STL NONLOCKING FULL: Type: IMPLANTABLE DEVICE | Site: CLAVICLE | Status: FUNCTIONAL

## 2023-03-21 DEVICE — IMPLANTABLE DEVICE: Type: IMPLANTABLE DEVICE | Site: CLAVICLE | Status: FUNCTIONAL

## 2023-03-21 DEVICE — SCREW BNE L18MM DIA2.7MM ANK S STL LOK LO PROF FOR FRAC: Type: IMPLANTABLE DEVICE | Site: CLAVICLE | Status: FUNCTIONAL

## 2023-03-21 DEVICE — SCREW BNE L20MM DIA2.7MM FT ANK S STL LOK LO PROF: Type: IMPLANTABLE DEVICE | Site: CLAVICLE | Status: FUNCTIONAL

## 2023-03-21 DEVICE — SCREW BNE L18MM DIA3.5MM ANK S STL CORT NONLOCKING FULL: Type: IMPLANTABLE DEVICE | Site: CLAVICLE | Status: FUNCTIONAL

## 2023-03-21 DEVICE — SCREW BNE L16MM DIA3.5MM CORT ANK S STL NONLOCKING LO PROF: Type: IMPLANTABLE DEVICE | Site: CLAVICLE | Status: FUNCTIONAL

## 2023-03-21 RX ORDER — SODIUM CHLORIDE 0.9 % (FLUSH) 0.9 %
5-40 SYRINGE (ML) INJECTION PRN
Status: DISCONTINUED | OUTPATIENT
Start: 2023-03-21 | End: 2023-03-21 | Stop reason: HOSPADM

## 2023-03-21 RX ORDER — SODIUM CHLORIDE 0.9 % (FLUSH) 0.9 %
5-40 SYRINGE (ML) INJECTION PRN
Status: CANCELLED | OUTPATIENT
Start: 2023-03-21

## 2023-03-21 RX ORDER — PROPOFOL 10 MG/ML
INJECTION, EMULSION INTRAVENOUS PRN
Status: DISCONTINUED | OUTPATIENT
Start: 2023-03-21 | End: 2023-03-21 | Stop reason: SDUPTHER

## 2023-03-21 RX ORDER — SODIUM CHLORIDE 9 MG/ML
INJECTION, SOLUTION INTRAVENOUS PRN
Status: DISCONTINUED | OUTPATIENT
Start: 2023-03-21 | End: 2023-03-21 | Stop reason: HOSPADM

## 2023-03-21 RX ORDER — DEXAMETHASONE SODIUM PHOSPHATE 10 MG/ML
10 INJECTION, SOLUTION INTRAMUSCULAR; INTRAVENOUS ONCE
Status: DISCONTINUED | OUTPATIENT
Start: 2023-03-21 | End: 2023-03-21 | Stop reason: HOSPADM

## 2023-03-21 RX ORDER — HYDROCODONE BITARTRATE AND ACETAMINOPHEN 5; 325 MG/1; MG/1
1 TABLET ORAL
Status: COMPLETED | OUTPATIENT
Start: 2023-03-21 | End: 2023-03-21

## 2023-03-21 RX ORDER — DEXAMETHASONE SODIUM PHOSPHATE 10 MG/ML
INJECTION, SOLUTION INTRAMUSCULAR; INTRAVENOUS
Status: DISCONTINUED
Start: 2023-03-21 | End: 2023-03-21 | Stop reason: WASHOUT

## 2023-03-21 RX ORDER — MEPERIDINE HYDROCHLORIDE 50 MG/ML
12.5 INJECTION INTRAMUSCULAR; INTRAVENOUS; SUBCUTANEOUS ONCE
Status: DISCONTINUED | OUTPATIENT
Start: 2023-03-21 | End: 2023-03-21 | Stop reason: HOSPADM

## 2023-03-21 RX ORDER — SODIUM CHLORIDE 0.9 % (FLUSH) 0.9 %
5-40 SYRINGE (ML) INJECTION EVERY 12 HOURS SCHEDULED
Status: DISCONTINUED | OUTPATIENT
Start: 2023-03-21 | End: 2023-03-21 | Stop reason: HOSPADM

## 2023-03-21 RX ORDER — HYDROCODONE BITARTRATE AND ACETAMINOPHEN 5; 325 MG/1; MG/1
TABLET ORAL
Status: COMPLETED
Start: 2023-03-21 | End: 2023-03-21

## 2023-03-21 RX ORDER — SODIUM CHLORIDE 9 MG/ML
25 INJECTION, SOLUTION INTRAVENOUS PRN
Status: DISCONTINUED | OUTPATIENT
Start: 2023-03-21 | End: 2023-03-21 | Stop reason: HOSPADM

## 2023-03-21 RX ORDER — ACETAMINOPHEN 500 MG
TABLET ORAL
Status: COMPLETED
Start: 2023-03-21 | End: 2023-03-21

## 2023-03-21 RX ORDER — SODIUM CHLORIDE 0.9 % (FLUSH) 0.9 %
5-40 SYRINGE (ML) INJECTION EVERY 12 HOURS SCHEDULED
Status: CANCELLED | OUTPATIENT
Start: 2023-03-21

## 2023-03-21 RX ORDER — SODIUM CHLORIDE 9 MG/ML
INJECTION, SOLUTION INTRAVENOUS PRN
Status: CANCELLED | OUTPATIENT
Start: 2023-03-21

## 2023-03-21 RX ORDER — FENTANYL CITRATE 50 UG/ML
INJECTION, SOLUTION INTRAMUSCULAR; INTRAVENOUS PRN
Status: DISCONTINUED | OUTPATIENT
Start: 2023-03-21 | End: 2023-03-21 | Stop reason: SDUPTHER

## 2023-03-21 RX ORDER — ONDANSETRON 2 MG/ML
4 INJECTION INTRAMUSCULAR; INTRAVENOUS
Status: DISCONTINUED | OUTPATIENT
Start: 2023-03-21 | End: 2023-03-21 | Stop reason: HOSPADM

## 2023-03-21 RX ORDER — ROCURONIUM BROMIDE 10 MG/ML
INJECTION, SOLUTION INTRAVENOUS PRN
Status: DISCONTINUED | OUTPATIENT
Start: 2023-03-21 | End: 2023-03-21 | Stop reason: SDUPTHER

## 2023-03-21 RX ORDER — METOCLOPRAMIDE HYDROCHLORIDE 5 MG/ML
10 INJECTION INTRAMUSCULAR; INTRAVENOUS
Status: DISCONTINUED | OUTPATIENT
Start: 2023-03-21 | End: 2023-03-21 | Stop reason: HOSPADM

## 2023-03-21 RX ORDER — MORPHINE SULFATE 2 MG/ML
1 INJECTION, SOLUTION INTRAMUSCULAR; INTRAVENOUS EVERY 5 MIN PRN
Status: DISCONTINUED | OUTPATIENT
Start: 2023-03-21 | End: 2023-03-21 | Stop reason: HOSPADM

## 2023-03-21 RX ORDER — CEFAZOLIN 2 G/1
INJECTION, POWDER, FOR SOLUTION INTRAMUSCULAR; INTRAVENOUS
Status: COMPLETED
Start: 2023-03-21 | End: 2023-03-21

## 2023-03-21 RX ORDER — ONDANSETRON 2 MG/ML
INJECTION INTRAMUSCULAR; INTRAVENOUS PRN
Status: DISCONTINUED | OUTPATIENT
Start: 2023-03-21 | End: 2023-03-21 | Stop reason: SDUPTHER

## 2023-03-21 RX ORDER — HYDRALAZINE HYDROCHLORIDE 20 MG/ML
10 INJECTION INTRAMUSCULAR; INTRAVENOUS
Status: DISCONTINUED | OUTPATIENT
Start: 2023-03-21 | End: 2023-03-21 | Stop reason: HOSPADM

## 2023-03-21 RX ORDER — NEOSTIGMINE METHYLSULFATE 5 MG/5 ML
SYRINGE (ML) INTRAVENOUS PRN
Status: DISCONTINUED | OUTPATIENT
Start: 2023-03-21 | End: 2023-03-21 | Stop reason: SDUPTHER

## 2023-03-21 RX ORDER — OXYCODONE HYDROCHLORIDE 5 MG/1
10 TABLET ORAL PRN
Status: DISCONTINUED | OUTPATIENT
Start: 2023-03-21 | End: 2023-03-21

## 2023-03-21 RX ORDER — ACETAMINOPHEN 325 MG/1
1000 TABLET ORAL ONCE
Status: CANCELLED | OUTPATIENT
Start: 2023-03-21 | End: 2023-03-21

## 2023-03-21 RX ORDER — DEXAMETHASONE SODIUM PHOSPHATE 10 MG/ML
INJECTION, SOLUTION INTRAMUSCULAR; INTRAVENOUS PRN
Status: DISCONTINUED | OUTPATIENT
Start: 2023-03-21 | End: 2023-03-21 | Stop reason: SDUPTHER

## 2023-03-21 RX ORDER — GLYCOPYRROLATE 0.2 MG/ML
INJECTION INTRAMUSCULAR; INTRAVENOUS PRN
Status: DISCONTINUED | OUTPATIENT
Start: 2023-03-21 | End: 2023-03-21 | Stop reason: SDUPTHER

## 2023-03-21 RX ORDER — OXYCODONE HYDROCHLORIDE 5 MG/1
5 TABLET ORAL PRN
Status: DISCONTINUED | OUTPATIENT
Start: 2023-03-21 | End: 2023-03-21

## 2023-03-21 RX ORDER — SODIUM CHLORIDE, SODIUM LACTATE, POTASSIUM CHLORIDE, CALCIUM CHLORIDE 600; 310; 30; 20 MG/100ML; MG/100ML; MG/100ML; MG/100ML
INJECTION, SOLUTION INTRAVENOUS CONTINUOUS
Status: DISCONTINUED | OUTPATIENT
Start: 2023-03-21 | End: 2023-03-21 | Stop reason: HOSPADM

## 2023-03-21 RX ORDER — BUPIVACAINE HYDROCHLORIDE AND EPINEPHRINE 5; 5 MG/ML; UG/ML
INJECTION, SOLUTION PERINEURAL PRN
Status: DISCONTINUED | OUTPATIENT
Start: 2023-03-21 | End: 2023-03-21 | Stop reason: ALTCHOICE

## 2023-03-21 RX ORDER — LIDOCAINE HYDROCHLORIDE 10 MG/ML
INJECTION, SOLUTION INFILTRATION; PERINEURAL PRN
Status: DISCONTINUED | OUTPATIENT
Start: 2023-03-21 | End: 2023-03-21 | Stop reason: SDUPTHER

## 2023-03-21 RX ORDER — HYDROCODONE BITARTRATE AND ACETAMINOPHEN 5; 325 MG/1; MG/1
1 TABLET ORAL EVERY 4 HOURS PRN
Qty: 42 TABLET | Refills: 0 | Status: SHIPPED | OUTPATIENT
Start: 2023-03-21 | End: 2023-03-28

## 2023-03-21 RX ORDER — ACETAMINOPHEN 500 MG
1000 TABLET ORAL ONCE
Status: COMPLETED | OUTPATIENT
Start: 2023-03-21 | End: 2023-03-21

## 2023-03-21 RX ORDER — LIDOCAINE HYDROCHLORIDE 10 MG/ML
1 INJECTION, SOLUTION INFILTRATION; PERINEURAL
Status: DISCONTINUED | OUTPATIENT
Start: 2023-03-21 | End: 2023-03-21 | Stop reason: HOSPADM

## 2023-03-21 RX ORDER — CEFAZOLIN SODIUM 1 G/3ML
INJECTION, POWDER, FOR SOLUTION INTRAMUSCULAR; INTRAVENOUS PRN
Status: DISCONTINUED | OUTPATIENT
Start: 2023-03-21 | End: 2023-03-21 | Stop reason: SDUPTHER

## 2023-03-21 RX ORDER — DIPHENHYDRAMINE HYDROCHLORIDE 50 MG/ML
12.5 INJECTION INTRAMUSCULAR; INTRAVENOUS
Status: DISCONTINUED | OUTPATIENT
Start: 2023-03-21 | End: 2023-03-21 | Stop reason: HOSPADM

## 2023-03-21 RX ADMIN — Medication 1000 MG: at 11:26

## 2023-03-21 RX ADMIN — SODIUM CHLORIDE, POTASSIUM CHLORIDE, SODIUM LACTATE AND CALCIUM CHLORIDE: 600; 310; 30; 20 INJECTION, SOLUTION INTRAVENOUS at 12:49

## 2023-03-21 RX ADMIN — HYDROMORPHONE HYDROCHLORIDE 0.5 MG: 1 INJECTION, SOLUTION INTRAMUSCULAR; INTRAVENOUS; SUBCUTANEOUS at 15:39

## 2023-03-21 RX ADMIN — Medication 0.5 MG: at 15:48

## 2023-03-21 RX ADMIN — FENTANYL CITRATE 50 MCG: 50 INJECTION, SOLUTION INTRAMUSCULAR; INTRAVENOUS at 12:49

## 2023-03-21 RX ADMIN — HYDROCODONE BITARTRATE AND ACETAMINOPHEN 1 TABLET: 5; 325 TABLET ORAL at 16:03

## 2023-03-21 RX ADMIN — DEXAMETHASONE SODIUM PHOSPHATE 10 MG: 10 INJECTION INTRAMUSCULAR; INTRAVENOUS at 12:59

## 2023-03-21 RX ADMIN — FENTANYL CITRATE 25 MCG: 50 INJECTION, SOLUTION INTRAMUSCULAR; INTRAVENOUS at 14:36

## 2023-03-21 RX ADMIN — HYDROMORPHONE HYDROCHLORIDE 0.5 MG: 1 INJECTION, SOLUTION INTRAMUSCULAR; INTRAVENOUS; SUBCUTANEOUS at 15:31

## 2023-03-21 RX ADMIN — Medication 0.5 MG: at 15:39

## 2023-03-21 RX ADMIN — HYDROMORPHONE HYDROCHLORIDE 0.5 MG: 1 INJECTION, SOLUTION INTRAMUSCULAR; INTRAVENOUS; SUBCUTANEOUS at 15:48

## 2023-03-21 RX ADMIN — SODIUM CHLORIDE, POTASSIUM CHLORIDE, SODIUM LACTATE AND CALCIUM CHLORIDE: 600; 310; 30; 20 INJECTION, SOLUTION INTRAVENOUS at 14:26

## 2023-03-21 RX ADMIN — FENTANYL CITRATE 25 MCG: 50 INJECTION, SOLUTION INTRAMUSCULAR; INTRAVENOUS at 14:59

## 2023-03-21 RX ADMIN — LIDOCAINE HYDROCHLORIDE 40 MG: 10 INJECTION, SOLUTION INFILTRATION; PERINEURAL at 12:49

## 2023-03-21 RX ADMIN — Medication 0.5 MG: at 15:58

## 2023-03-21 RX ADMIN — ONDANSETRON 4 MG: 2 INJECTION INTRAMUSCULAR; INTRAVENOUS at 14:33

## 2023-03-21 RX ADMIN — ROCURONIUM BROMIDE 50 MG: 10 INJECTION, SOLUTION INTRAVENOUS at 12:49

## 2023-03-21 RX ADMIN — CEFAZOLIN 2 G: 2 INJECTION, POWDER, FOR SOLUTION INTRAMUSCULAR; INTRAVENOUS at 12:57

## 2023-03-21 RX ADMIN — Medication 3 MG: at 14:32

## 2023-03-21 RX ADMIN — HYDROMORPHONE HYDROCHLORIDE 0.5 MG: 1 INJECTION, SOLUTION INTRAMUSCULAR; INTRAVENOUS; SUBCUTANEOUS at 15:58

## 2023-03-21 RX ADMIN — ROCURONIUM BROMIDE 20 MG: 10 INJECTION, SOLUTION INTRAVENOUS at 13:23

## 2023-03-21 RX ADMIN — GLYCOPYRROLATE 0.3 MG: 0.2 INJECTION INTRAMUSCULAR; INTRAVENOUS at 14:32

## 2023-03-21 RX ADMIN — ACETAMINOPHEN 1000 MG: 500 TABLET ORAL at 11:26

## 2023-03-21 RX ADMIN — Medication 0.5 MG: at 15:31

## 2023-03-21 RX ADMIN — PROPOFOL 200 MG: 10 INJECTION, EMULSION INTRAVENOUS at 12:49

## 2023-03-21 ASSESSMENT — PAIN SCALES - GENERAL
PAINLEVEL_OUTOF10: 5
PAINLEVEL_OUTOF10: 4
PAINLEVEL_OUTOF10: 8
PAINLEVEL_OUTOF10: 2
PAINLEVEL_OUTOF10: 2
PAINLEVEL_OUTOF10: 7

## 2023-03-21 ASSESSMENT — PAIN DESCRIPTION - LOCATION
LOCATION: SHOULDER

## 2023-03-21 ASSESSMENT — LIFESTYLE VARIABLES: SMOKING_STATUS: 1

## 2023-03-21 ASSESSMENT — PAIN DESCRIPTION - DESCRIPTORS
DESCRIPTORS: SORE
DESCRIPTORS: ACHING

## 2023-03-21 ASSESSMENT — PAIN DESCRIPTION - ORIENTATION
ORIENTATION: RIGHT

## 2023-03-21 ASSESSMENT — PAIN - FUNCTIONAL ASSESSMENT: PAIN_FUNCTIONAL_ASSESSMENT: 0-10

## 2023-03-21 NOTE — H&P
Update History & Physical    The patient's History and Physical of March 15, 2023 was reviewed with the patient and I examined the patient. There was no change. The surgical site was confirmed by the patient and me. Plan: The risks, benefits, expected outcome, and alternative to the recommended procedure have been discussed with the patient. Patient understands and wants to proceed with the procedure.      Electronically signed by Tam Ledesma MD on 3/21/2023 at 12:44 PM

## 2023-03-21 NOTE — BRIEF OP NOTE
Brief Postoperative Note      Patient: Salud Moe  YOB: 1985  MRN: [id number]    Date of Procedure: 3/21/2023    Pre-Op Diagnosis: Traumatic closed fracture of distal clavicle with displacement, right, initial encounter [S42.384N]    Post-Op Diagnosis: Same       Procedure(s):  RIGHT DISTAL CLAVICLE OPEN REDUCTION INTERNAL FIXATION WITH ARTHREX    Surgeon(s):  Kailash Grigsby MD    Assistant:  * No surgical staff found *    Anesthesia: General    Estimated Blood Loss (mL): less than 50     Complications: None    Specimens:   * No specimens in log *    Implants:  Implant Name Type Inv. Item Serial No.  Lot No. LRB No. Used Action   [id number] TigerTape Cerclage 2mm, 48\" with FiberLink Shuttle Suture     [id number] Right 1 Implanted   SCREW BNE L16MM DIA3.5MM ANDRE ANK S STL NONLOCKING LO PROF - [id number]  SCREW BNE L16MM DIA3.5MM ANDRE ANK S STL NONLOCKING LO PROF  [employer]  Right 1 Implanted   SCREW BNE L18MM DIA3. 5MM ANK S STL ANDRE NONLOCKING FULL - [id number]  SCREW BNE L18MM DIA3. 5MM ANK S STL ANDRE NONLOCKING FULL  [employer]  Right 3 Implanted   PLATE BONE [id number] 8 H LNG RT DSTL 3RD CLAV S STL M CVD FOR - [id number]  PLATE BONE [id number] 8 H LNG RT DSTL 3RD CLAV S STL M CVD FOR  [employer]  Right 1 Implanted   SCREW BNE L18MM DIA2.7MM ANK S STL HO LO PROF FOR FRA - [id number]  SCREW BNE L18MM DIA2.7MM ANK S STL HO LO PROF FOR FRA  [employer]  Right 1 Implanted   SCREW BNE L20MM DIA2.7MM FT ANK S STL HO LO PROF - [id number]  SCREW BNE L20MM DIA2.7MM FT ANK S STL HO LO PROF  [employer]  Right 1 Implanted   SCREW BNE L18MM DIA2.7MM ANDRE ANK S STL NONLOCKING FULL - [id number]  SCREW BNE L18MM DIA2.7MM ANDRE ANK S STL NONLOCKING FULL  [employer]  Right 3 Implanted         Drains: * No LDAs found *    Findings: See operative report    Electronically signed by Kailash Grigsby MD on 3/21/2023 at 2:49 PM

## 2023-03-21 NOTE — DISCHARGE INSTRUCTIONS
for at least 6 weeks following surgery. NO driving while taking narcotic medications or until instructed otherwise by physician. May return to work 3-4 days after surgery, if pain is  Tolerable, and without use of the surgical arm. BRACE  Wear your sling at all times. You may take it off for hygiene and a few times a day to move your elbow    ICE THERAPY  Begin immediately after surgery. Do not place ice directly on the skin (place over an Ace wrap or thin clothing). Use icing machine continuously or ice packs (if machine not prescribed) every 2  hours for 20 minutes daily for the first week. EXERCISE  On the initial post-operative visit, you will be shown gentle range of motion exercises which you should perform at least 3 times per day. Formal physical therapy (PT) may begin about 4 weeks post-operatively with a  prescription provided at that post-operative visit if deemed necessary. Dennie Hansen Dr. Drucie Grey or his nurse at [phone] if any of the following are present: Painful swelling or numbness, Unrelenting pain, Fever (over 101 - it is normal to have a low grade fever for the first day or two following surgery) or chills, Redness around incisions, Color change in foot or toes, Continuous drainage or bleeding from incision (a small amount of drainage is expected), Difficulty breathing, Excessive nausea/vomiting **If you have an emergency after office hours or on the weekend, contact the same office number ([phone]) and you will be connected to our page service - they will contact Dr. Darrell Cuba or his partner if he is unavailable. Do NOT call the hospital or surgicenter. **If you have an emergency that requires immediate attention, proceed to the nearest emergency room. FOLLOW-UP CARE / QUESTIONS  If you have any questions/concerns, please call the office [phone]. Contact the office to confirm/schedule your post-op visits.   Typically post-operative appointments are made for

## 2023-03-21 NOTE — OP NOTE
screws and medially with three 2.7 mm cortical screws and two 2.7 mm locking screws. This resulted in secure fixation of the fracture. Intraoperative fluoroscopy was utilized to confirm appropriate fracture reduction and fixation as well as appropriate plate and screw placement. The patient's surgical wound was then thoroughly irrigated using copious amounts of sterile saline. Hemostasis was achieved using electrocautery. The surgical site was infiltrated with approximately 20 cc of 0.5% Marcaine with epinephrine. Layered closure of the patient's incision was performed using #1 Vicryl for deep layers, 3-0 Vicryl for subcutaneous layers, and 3-0 Prolene for subcuticular closure. Sterile dressings were applied using Steri-Strips, 4 x 4 gauze, and Tegaderm. Patient's right upper extremity was then placed in an immobilizer. Patient was awoken transferred to his bed and wheeled to recovery in stable condition.      Complications: None     Post-operative Condition: Stable

## 2023-03-21 NOTE — ANESTHESIA PRE PROCEDURE
Department of Anesthesiology  Preprocedure Note       Name:  Musa Turk   Age:  43 y.o.  :  1985                                          MRN:  [id number]         Date:  3/21/2023      Surgeon: Raymond Ocampo):  Nia Barkley MD    Procedure: Procedure(s):  RIGHT DISTAL CLAVICLE OPEN REDUCTION INTERNAL FIXATION WITH ARTHREX    Medications prior to admission:   Prior to Admission medications    Medication Sig Start Date End Date Taking? Authorizing Provider   acetaminophen-codeine (TYLENOL/CODEINE #3) 300-30 MG per tablet Take 1 tablet by mouth 2 times daily as needed for Pain for up to 14 days. Intended supply: 3 days.  Take lowest dose possible to manage pain Max Daily Amount: 2 tablets 3/15/23 3/29/23  AndrewsJAMAR Adan CNP   topiramate (TOPAMAX) 100 MG tablet TAKE 1 TABLET BY MOUTH TWO TIMES A DAY 3/14/23   JAMAR Bo CNP   topiramate (TOPAMAX) 50 MG tablet TAKE 1 TABLET BY MOUTH TWO TIMES A DAY, along with 100 mg tablets for a total of 150 mg 2 times daily 3/14/23   JAMAR Bo CNP   vitamin D3 (CHOLECALCIFEROL) 25 MCG (1000 UT) TABS tablet Take 1 tablet by mouth daily 2/10/23   JAMAR Maldonado CNP   Atogepant (QULIPTA) 60 MG TABS Take 60 mg by mouth daily 22   JAMAR Bo CNP       Current medications:    Current Facility-Administered Medications   Medication Dose Route Frequency Provider Last Rate Last Admin    lidocaine 1 % injection 1 mL  1 mL IntraDERmal Once PRN Padmini Sinha MD        lactated ringers IV soln infusion   IntraVENous Continuous Padmini Sinha MD        sodium chloride flush 0.9 % injection 5-40 mL  5-40 mL IntraVENous 2 times per day Padmini Sinha MD        sodium chloride flush 0.9 % injection 5-40 mL  5-40 mL IntraVENous PRN Padmini Sinha MD        0.9 % sodium chloride infusion   IntraVENous PRN Padmini Sinha MD        acetaminophen (TYLENOL) tablet 1,000 mg  1,000 mg Oral Once Nia Barkley MD

## 2023-03-22 NOTE — ANESTHESIA POSTPROCEDURE EVALUATION
Department of Anesthesiology  Postprocedure Note    Patient: Belle Mejia  MRN: [id number]  [place]: 1985  Date of evaluation: 3/22/2023      Procedure Summary     Date: 03/21/23 Room / Location: [address]) [facility]    Anesthesia Start: 9301 Anesthesia Stop: 0159    Procedure: RIGHT DISTAL CLAVICLE OPEN REDUCTION INTERNAL FIXATION WITH ARTHREX (Right) Diagnosis:       Traumatic closed fracture of distal clavicle with minimal displacement, right, initial encounter      (Traumatic closed fracture of distal clavicle with minimal displacement, right, initial encounter [X27.001E])    Surgeons: Buck Valdovinos MD Responsible Provider: Marie Sosa MD    Anesthesia Type: general ASA Status: 3          Anesthesia Type: No value filed.     Leo Phase I: Leo Score: 10    Leo Phase II: Leo Score: 10      Anesthesia Post Evaluation    Patient location during evaluation: PACU  Level of consciousness: awake and alert  Airway patency: patent  Nausea & Vomiting: no nausea and no vomiting  Complications: no  Cardiovascular status: blood pressure returned to baseline  Respiratory status: acceptable and room air  Hydration status: euvolemic

## 2023-03-24 DIAGNOSIS — S42.031A CLOSED DISPLACED FRACTURE OF ACROMIAL END OF RIGHT CLAVICLE, INITIAL ENCOUNTER: Primary | ICD-10-CM

## 2023-03-24 NOTE — TELEPHONE ENCOUNTER
Patient of Dr Lolis Quesada asking for a return call regarding post op pain. He states his pain level is not subsiding and would like to discuss. Right Distal Clavicle  3/17/23    Thank you.

## 2023-03-25 ENCOUNTER — HOSPITAL ENCOUNTER (EMERGENCY)
Age: 38
Discharge: HOME | End: 2023-03-26
Attending: EMERGENCY MEDICINE
Payer: COMMERCIAL

## 2023-03-25 DIAGNOSIS — G89.18 POST-OP PAIN: ICD-10-CM

## 2023-03-25 DIAGNOSIS — M25.511 RIGHT SHOULDER PAIN, UNSPECIFIED CHRONICITY: Primary | ICD-10-CM

## 2023-03-25 PROCEDURE — 99283 EMERGENCY DEPT VISIT LOW MDM: CPT

## 2023-03-26 ENCOUNTER — APPOINTMENT (OUTPATIENT)
Dept: GENERAL RADIOLOGY | Age: 38
End: 2023-03-26
Payer: COMMERCIAL

## 2023-03-26 VITALS
RESPIRATION RATE: 15 BRPM | BODY MASS INDEX: 26.82 KG/M2 | DIASTOLIC BLOOD PRESSURE: 90 MMHG | HEIGHT: 74 IN | HEART RATE: 101 BPM | WEIGHT: 209 LBS | SYSTOLIC BLOOD PRESSURE: 128 MMHG | OXYGEN SATURATION: 98 % | TEMPERATURE: 98 F

## 2023-03-26 PROCEDURE — 6370000000 HC RX 637 (ALT 250 FOR IP): Performed by: EMERGENCY MEDICINE

## 2023-03-26 PROCEDURE — 73000 X-RAY EXAM OF COLLAR BONE: CPT

## 2023-03-26 RX ORDER — HYDROCODONE BITARTRATE AND ACETAMINOPHEN 5; 325 MG/1; MG/1
1 TABLET ORAL ONCE
Status: COMPLETED | OUTPATIENT
Start: 2023-03-26 | End: 2023-03-26

## 2023-03-26 RX ADMIN — HYDROCODONE BITARTRATE AND ACETAMINOPHEN 1 TABLET: 5; 325 TABLET ORAL at 00:53

## 2023-03-26 ASSESSMENT — ENCOUNTER SYMPTOMS
SHORTNESS OF BREATH: 0
COLOR CHANGE: 0
ABDOMINAL PAIN: 0
TROUBLE SWALLOWING: 0
DIARRHEA: 0
COUGH: 0
PHOTOPHOBIA: 0
NAUSEA: 0
VOMITING: 0

## 2023-03-26 ASSESSMENT — PAIN DESCRIPTION - LOCATION: LOCATION: SHOULDER

## 2023-03-26 ASSESSMENT — PAIN - FUNCTIONAL ASSESSMENT: PAIN_FUNCTIONAL_ASSESSMENT: 0-10

## 2023-03-26 ASSESSMENT — PAIN DESCRIPTION - DESCRIPTORS
DESCRIPTORS: ACHING
DESCRIPTORS: ACHING

## 2023-03-26 ASSESSMENT — PAIN SCALES - GENERAL
PAINLEVEL_OUTOF10: 7
PAINLEVEL_OUTOF10: 8

## 2023-03-26 ASSESSMENT — LIFESTYLE VARIABLES
HOW MANY STANDARD DRINKS CONTAINING ALCOHOL DO YOU HAVE ON A TYPICAL DAY: PATIENT DOES NOT DRINK
HOW OFTEN DO YOU HAVE A DRINK CONTAINING ALCOHOL: NEVER

## 2023-03-26 ASSESSMENT — PAIN DESCRIPTION - ORIENTATION: ORIENTATION: RIGHT

## 2023-03-26 NOTE — ED TRIAGE NOTES
Mode of arrival (squad #, walk in, police, etc) : walk in        Chief complaint(s): Rt clavicle pain        Arrival Note (brief scenario, treatment PTA, etc). : Pt had surgery on March 22nd. Pain is not controlled. C= \"Have you ever felt that you should Cut down on your drinking? \"  no  A= \"Have people Annoyed you by criticizing your drinking? \"  no  G= \"Have you ever felt bad or Guilty about your drinking? \"  no  E= \"Have you ever had a drink as an Eye-opener first thing in the morning to steady your nerves or to help a hangover? \"  no      Deferred []      Reason for deferring:     *If yes to two or more: probable alcohol abuse. *

## 2023-03-26 NOTE — ED PROVIDER NOTES
HYDROcodone-acetaminophen (NORCO) 5-325 MG per tablet 1 tablet     DISCHARGE PRESCRIPTIONS:  New Prescriptions    No medications on file     PHYSICIAN CONSULTS ORDERED THIS ENCOUNTER:  None  FINAL IMPRESSION      1. Right shoulder pain, unspecified chronicity    2.  Post-op pain          DISPOSITION/PLAN   DISPOSITION Decision To Discharge 03/26/2023 01:23:01 AM      OUTPATIENT FOLLOW UP THE PATIENT:  Cyndi Ornelas, APRN - CNP  [address] [address]  [phone]    Schedule an appointment as soon as possible for a visit in 2 days      [facility] ED  [facility], [place] [address]  [phone]  Go to   As needed, If symptoms worsen    Germán Bui MD  [address]  [phone]    Schedule an appointment as soon as possible for a visit in 2 days      DO Juan Sabillon [address] Gilbert Leyva DO  03/26/23 8808

## 2023-03-27 NOTE — TELEPHONE ENCOUNTER
Patient returned call to practice, State he did go to ED and was told they could not help that he would need to see Dr. Yue Cordero. Did attempt to transfer however all staff members were in with patients. Patient is asking to be called back.

## 2023-03-27 NOTE — TELEPHONE ENCOUNTER
Called pt and he staetd that nothing is helping his pain during the day but the Christiano Gonzalez is helping take the edge off at night. Pt was advised that he can take 2 Norcos and supplement with IBU if needed but to go back down to 1 [place] once he controls his pain. Pt stated that he is already taking Advil and icing 20 min every two hours. Dr. Jerri Erazo, is there anything else that you advise for this pt to help with his pain?

## 2023-03-28 NOTE — TELEPHONE ENCOUNTER
Voltaren pending. LVM with pt advising him of Dr. Ruddy Grimaldo recommendations and to stop taking the advil or other NSAIDs when he starts the Voltaren.

## 2023-03-29 RX ORDER — DICLOFENAC SODIUM 75 MG/1
75 TABLET, DELAYED RELEASE ORAL 2 TIMES DAILY WITH MEALS
Qty: 28 TABLET | Refills: 0 | Status: SHIPPED | OUTPATIENT
Start: 2023-03-29

## 2023-03-31 ENCOUNTER — OFFICE VISIT (OUTPATIENT)
Dept: ORTHOPEDIC SURGERY | Age: 38
End: 2023-03-31

## 2023-03-31 VITALS — HEIGHT: 74 IN | BODY MASS INDEX: 26.82 KG/M2 | WEIGHT: 209 LBS | RESPIRATION RATE: 16 BRPM

## 2023-03-31 DIAGNOSIS — Z87.81 STATUS POST OPEN REDUCTION AND INTERNAL FIXATION (ORIF) OF FRACTURE: Primary | ICD-10-CM

## 2023-03-31 DIAGNOSIS — S42.031A CLOSED DISPLACED FRACTURE OF ACROMIAL END OF RIGHT CLAVICLE, INITIAL ENCOUNTER: ICD-10-CM

## 2023-03-31 DIAGNOSIS — Z98.890 STATUS POST OPEN REDUCTION AND INTERNAL FIXATION (ORIF) OF FRACTURE: Primary | ICD-10-CM

## 2023-03-31 PROCEDURE — 99024 POSTOP FOLLOW-UP VISIT: CPT | Performed by: ORTHOPAEDIC SURGERY

## 2023-03-31 NOTE — PROGRESS NOTES
Procedure: Right distal clavicle fracture ORIF  Date of procedure: 3/21/2023    HPI: Mr. Aye Hodge is a 27-year-old who is approximately 10 days status post the aforementioned procedure. He is doing relatively well. He was having some pain control issues a few days earlier and so went to the emergency department for this. He denies any fevers chills or sweats. Physical examination:  Evaluation of his right shoulder and upper extremity demonstrates his incision to be clean, dry, intact. Moderate diffuse swelling noted. Sensation is altered to light touch along the anterior chest wall distal to the incision. No wound dehiscence or drainage noted. He has a 2+ radial pulse with brisk cap refill in his fingers. 2 x-ray views of the right clavicle completed on 3/26/2023 were reviewed independently demonstrating a distal clavicle fracture in acceptable alignment with superior plate and screw construct fixation. No obvious loosening or migration of his implants. Impression and plan: Mr. Aye Hodge is a 27-year-old who is approximately 10 days status post a right distal clavicle fracture ORIF. He is doing relatively well at this time. Today sutures were taken out and Steri-Strips and clean dressings were applied. Robles Gamez get his incision wet in the shower but should avoid submerging it in a bath or any body of water. He is going to continue on with his sling for an additional 2 weeks. I am going to have him start pendulum exercises today which she was provided and taught. A prescription for therapy to begin in 2 weeks was also provided to initiate working on his range of motion. I will see him back for reevaluation in 4 weeks but he may return or call earlier with questions or concerns.

## 2023-04-17 NOTE — FLOWSHEET NOTE
fracture healing  ? Function: able to reach actively to first level of cabinets, lifting/carrying up to 15# unilaterally  Independent with Home Exercise Programs                    Patient goals:range of motion    Pt. Education:  [x] Yes  [] No  [x] Reviewed Prior HEP/Ed; improved postural awareness; importance of mobility without increasing sharp/shooting pains; educated on the benefits of icing after exercise and educated on use of vaso compression post exercise if needed,   Method of Education: [x] Verbal  [x] Demo  [] Written  Comprehension of Education:  [x] Verbalizes understanding. [x] Demonstrates understanding. [] Needs review. [] Demonstrates/verbalizes HEP/Ed previously given. Plan: [x] Continue per plan of care.    [] Other:      Treatment Charges: Mins Units   []  Modalities     [x]  Ther Exercise 38 3   []  Manual Therapy     []  Ther Activities     []  Aquatics     []  Neuromuscular     [] Vasocompression     [] Gait Training     [] Dry needling        [] 1 or 2 muscles        [] 3 or more muscles     []  Other     Total Treatment time 38 3     Time In: 001 AM            Time Out: 304 AM    Electronically signed by:  Ruth Garibay PTA

## 2023-04-18 ENCOUNTER — HOSPITAL ENCOUNTER (OUTPATIENT)
Dept: PHYSICAL THERAPY | Age: 38
Setting detail: THERAPIES SERIES
Discharge: HOME | End: 2023-04-18
Payer: MEDICAID

## 2023-04-18 PROCEDURE — 97110 THERAPEUTIC EXERCISES: CPT

## 2023-04-19 ENCOUNTER — HOSPITAL ENCOUNTER (OUTPATIENT)
Dept: PHYSICAL THERAPY | Age: 38
Setting detail: THERAPIES SERIES
Discharge: HOME | End: 2023-04-19
Payer: MEDICAID

## 2023-04-19 NOTE — FLOWSHEET NOTE
[] [address]  [place]: [phone]   F: [phone]    Physical Therapy Cancel/Missed Visit Note    Date:  23   Patient Name:  Wilberto Mullen    :  1985  MRN: [id number]  Physician: Miracle Matias MD                                   Insurance: [facility] then Mikal Cancel  **AUTH NOT ON FILE AS OF THIS VISIT **    Medical Diagnosis: (R) S42.031A closed fracture of (R) clavicle, s/p open reduction and internal fixation Z98.890             Rehab Codes: (R) shoulder pain M25.511; weakness (R) M25.351  Onset Date: 3/31/23   Referral , surgery 3/21/23 fall 3/17/23         Next 's appt: 23    Visit Count: 2                                Cancel/No Show: 0/1    Comments:Patient did not show.  Called and Left message regarding next scheduled appt       Electronically signed by:  Hunter Friends, PTA

## 2023-04-24 ENCOUNTER — HOSPITAL ENCOUNTER (OUTPATIENT)
Dept: PHYSICAL THERAPY | Age: 38
Setting detail: THERAPIES SERIES
Discharge: HOME | End: 2023-04-24
Payer: MEDICAID

## 2023-04-24 PROCEDURE — 97110 THERAPEUTIC EXERCISES: CPT

## 2023-04-24 PROCEDURE — 97140 MANUAL THERAPY 1/> REGIONS: CPT

## 2023-04-24 NOTE — FLOWSHEET NOTE
Ther Activities     []  Aquatics     []  Neuromuscular     [] Vasocompression     [] Gait Training     [] Dry needling        [] 1 or 2 muscles        [] 3 or more muscles     []  Other     Total Treatment time 40 3     Time In: 115 PM            Time Out: 2PM    Electronically signed by:  Lindsay Bishop PT

## 2023-04-28 ENCOUNTER — OFFICE VISIT (OUTPATIENT)
Dept: ORTHOPEDIC SURGERY | Age: 38
End: 2023-04-28

## 2023-04-28 VITALS — HEIGHT: 74 IN | WEIGHT: 209 LBS | BODY MASS INDEX: 26.82 KG/M2 | RESPIRATION RATE: 16 BRPM

## 2023-04-28 DIAGNOSIS — S42.031A CLOSED DISPLACED FRACTURE OF ACROMIAL END OF RIGHT CLAVICLE, INITIAL ENCOUNTER: ICD-10-CM

## 2023-04-28 DIAGNOSIS — Z87.81 STATUS POST OPEN REDUCTION AND INTERNAL FIXATION (ORIF) OF FRACTURE: Primary | ICD-10-CM

## 2023-04-28 DIAGNOSIS — Z98.890 STATUS POST OPEN REDUCTION AND INTERNAL FIXATION (ORIF) OF FRACTURE: Primary | ICD-10-CM

## 2023-04-28 NOTE — PROGRESS NOTES
Procedure: Right distal clavicle fracture ORIF  Date of procedure: 3/21/2023    HPI: Mr. Nadine Coronel is a 39-year-old who is approximately 1 month status post the aforementioned procedure. He continues to do fairly well at this time having minimal pain in the shoulder. Physical examination:  Evaluation of his right shoulder and upper extremity demonstrates his incision to be appropriately healed. Minimal swelling noted at this time. Sensation is altered to light touch along the anterior chest wall distal to the incision. No wound dehiscence or drainage noted. He has a 2+ radial pulse with brisk cap refill in his fingers. He has pretty good passive range of motion through his shoulder. Imaging studies:  2 x-ray views of the right clavicle completed on 4/28/2023 were reviewed independently demonstrating a distal clavicle fracture in acceptable alignment with superior plate and screw construct fixation. No obvious loosening or migration of his implants. Minimal consolidation across fracture site noted at this time. Impression and plan: Mr. Nadine Coronel is a 39-year-old who is approximately 1 month status post a right distal clavicle fracture ORIF. He is doing well clinically and radiographically at this time. He may continue using this arm for light activities of daily living. I am going to start him in physical therapy and a prescription was provided today. I will see him back for reevaluation in another month but he may return or call earlier with questions or concerns.

## 2023-05-01 ENCOUNTER — HOSPITAL ENCOUNTER (OUTPATIENT)
Dept: PHYSICAL THERAPY | Age: 38
Setting detail: THERAPIES SERIES
Discharge: HOME | End: 2023-05-01
Payer: MEDICAID

## 2023-05-01 PROCEDURE — 97140 MANUAL THERAPY 1/> REGIONS: CPT

## 2023-05-01 PROCEDURE — 97110 THERAPEUTIC EXERCISES: CPT

## 2023-05-01 RX ORDER — DICLOFENAC SODIUM 75 MG/1
TABLET, DELAYED RELEASE ORAL
Qty: 28 TABLET | Refills: 0 | Status: SHIPPED | OUTPATIENT
Start: 2023-05-01

## 2023-05-01 NOTE — FLOWSHEET NOTE
[] [address]  [place]: [phone]   F: [phone]    Physical Therapy Daily Treatment Note      Date:  23   Patient Name:  Raghav Carpio    :  1985  MRN: [id number]  Physician: Tlaa Unger MD                                   Insurance: [address] [place] [address] through  12 visits [phone], [phone], [id number]  Medical Diagnosis: [phone] closed fracture of (R) clavicle, s/p open reduction and internal fixation Z98.890             Rehab Codes: (R) shoulder pain M25.511; weakness (R) M25.351  Onset Date: 3/31/23   Referral , surgery 3/21/23 fall 3/17/23         Next 's appt: 23  Visit Count:                                 Cancel/No Show: 0/2    Subjective:  Patient still limited with restrictions per physician referral from last treatment session. Stil about 6 weeks post surgery- no strengthening for about another month per physician request.    Pain:  [x] Yes  [] No Location: R shoulder- GH joint line to anterior chest/clavicular region  Pain Rating: (0-10 scale) 3-4/10  Pain altered Tx:  [] No  [x] Yes  Action: Completed program to tolerance due to pain    Comments:    Objective:   Modalities:   Precautions:Patient currently allowed to do AROM and AAROM but physician does not want resisted exercises until 1 month per most recent referral (23) (ONE MONTH-  23)  Acute displaced mid to distal right clavicular fracture measuring approximately 2 cm of displacement  **Watch for vertigo in supine - Hx of hydrocephalus; multiple brain surgeries;  Dx at 14 y/o- mornings worse than afternoons**  Exercises:  Exercise Reps/ Time Weight/ Level Completed  23  Comments   Pulley's Flex/Abd 2' each  x    UBE 2/2  X    Seated Wand ER @ neutral 15x  X    Supine Wand Overhead Flexion 15x  X    Wand Chest Press 15x  x    Seated upper trap stretch 20\" x 3   X    Doorway pec stretch 20\" x 3  X    IR towel stretch 10 x 5\"

## 2023-05-08 ENCOUNTER — HOSPITAL ENCOUNTER (OUTPATIENT)
Dept: PHYSICAL THERAPY | Age: 38
Setting detail: THERAPIES SERIES
Discharge: HOME | End: 2023-05-08
Payer: COMMERCIAL

## 2023-05-08 PROCEDURE — 97140 MANUAL THERAPY 1/> REGIONS: CPT

## 2023-05-08 PROCEDURE — 97110 THERAPEUTIC EXERCISES: CPT

## 2023-05-08 NOTE — FLOWSHEET NOTE
of motion    Pt. Education:  [x] Yes  [] No  [x] Reviewed Prior HEP/Ed; improved postural awareness; importance of mobility without increasing sharp/shooting pains; educated on the benefits of icing after exercise and educated on use of vaso compression post exercise if needed,   Method of Education: [x] Verbal  [x] Demo  [] Written  Comprehension of Education:  [x] Verbalizes understanding. [x] Demonstrates understanding. [] Needs review. [] Demonstrates/verbalizes HEP/Ed previously given. Plan: [x] Continue per plan of care.    [] Other:      Treatment Charges: Mins Units   []  Modalities     [x]  Ther Exercise 30 2   [x]  Manual Therapy 10 1   []  Ther Activities     []  Aquatics     []  Neuromuscular     [] Vasocompression     [] Gait Training     [] Dry needling        [] 1 or 2 muscles        [] 3 or more muscles     []  Other     Total Treatment time 40 3     Time In: 7670            Time Out: 3PM    Electronically signed by:  Ankita Wei PT

## 2023-05-11 ENCOUNTER — HOSPITAL ENCOUNTER (OUTPATIENT)
Dept: PHYSICAL THERAPY | Age: 38
Setting detail: THERAPIES SERIES
Discharge: HOME | End: 2023-05-11
Payer: COMMERCIAL

## 2023-05-11 NOTE — FLOWSHEET NOTE
[] [address]  [place]: [phone]   F: [phone]    Physical Therapy Cancel/Missed Visit Note    Date:  23   Patient Name:  Wilberto Mullen    :  1985  MRN: [id number]  Physician: Miracle Matias MD                                   Insurance: [facility] [facility] then [facility] Cancel      Medical Diagnosis: [phone] closed fracture of (R) clavicle, s/p open reduction and internal fixation Z98.890             Rehab Codes: (R) shoulder pain M25.511; weakness (R) M25.351  Onset Date: 3/31/23   Referral , surgery 3/21/23 fall 3/17/23         Next 's appt: 23    Visit Count:                                 Cancel/No Show: 1/3    Comments:Patient called and cancelled- unable to make it.   Patient did reschedule for next week      Electronically signed by:  Germán Ramachandran PT

## 2023-05-18 ENCOUNTER — HOSPITAL ENCOUNTER (OUTPATIENT)
Dept: PHYSICAL THERAPY | Age: 38
Setting detail: THERAPIES SERIES
Discharge: HOME | End: 2023-05-18
Payer: COMMERCIAL

## 2023-05-18 PROCEDURE — 97110 THERAPEUTIC EXERCISES: CPT

## 2023-05-18 NOTE — FLOWSHEET NOTE
[] [address]  [place]: [phone]   F: [phone]    Physical Therapy Daily Treatment Note      Date:  23   Patient Name:  Juan Wheeler    :  1985  MRN: [id number]  Physician: Melody Heller MD                                   Insurance: [address] [place] [address] through  12 visits [phone], [phone], [phone]  Medical Diagnosis: [phone] closed fracture of (R) clavicle, s/p open reduction and internal fixation Z98.890             Rehab Codes: (R) shoulder pain M25.511; weakness (R) M25.351  Onset Date: 3/31/23   Referral , surgery 3/21/23 fall 3/17/23         Next 's appt: 23  Visit Count:                                 Cancel/No Show:     Subjective:  Overslept last 2 visits due to sleeping meds; states he was to walk to PT. States pain has been minimal- denies issues reaching behind back or overhead washing hair; feels reaching overhead has improved- does not difficulty and pain with outstretched reaching (Horiz Abd)- pain shoots up to 6/10. Reports he has been doing wall slides for stretching but minimal compliance with other exercise/stretchingand trying to return to more normal activity. Feels clicking has improved. Prefers to sit this date vs supine due to shunt/veritgo    Pain:  [x] Yes  [] No Location: R shoulder- GH joint line to mid clavicular region- [facility]  Pain Rating: (0-10 scale) 3/10  Pain altered Tx:  [x] No  [] Yes  Action:     Comments:    Objective:   Modalities:   Precautions:Patient currently allowed to do AROM and AAROM but physician does not want resisted exercises until 1 month per most recent referral (23) (ONE MONTH-  23)  Acute displaced mid to distal right clavicular fracture measuring approximately 2 cm of displacement  **Watch for vertigo in supine - Hx of hydrocephalus; multiple brain surgeries;  Dx at 16 y/o- mornings worse than

## 2023-05-22 ENCOUNTER — HOSPITAL ENCOUNTER (OUTPATIENT)
Dept: PHYSICAL THERAPY | Age: 38
Setting detail: THERAPIES SERIES
Discharge: HOME | End: 2023-05-22
Payer: COMMERCIAL

## 2023-05-22 NOTE — FLOWSHEET NOTE
[x] [facility]) - [facility] & Therapy  [address] [facility] [address]  [place]: [phone]   F: [phone]    Physical Therapy CXL/NS      Date:  23   Patient Name:  Ian Collet    :  1985  MRN: [id number]  Physician: Gordy Barker MD                                   Insurance: [address] [place] [address] through  12 visits [phone], [id number], [phone]  Medical Diagnosis: [phone] closed fracture of (R) clavicle, s/p open reduction and internal fixation Z98.890             Rehab Codes: (R) shoulder pain M25.511; weakness (R) M25.351  Onset Date: 3/31/23   Referral , surgery 3/21/23 fall 3/17/23         Next 's appt: 23  Visit Count:                                 Cancel/No Show:     Did not show for appointment- unable to reach patient.     Electronically signed by:  Richard Gonsales PT

## 2023-05-22 NOTE — DISCHARGE INSTRUCTIONS
Please follow up with orthopedic surgeon. Return to the ED if you develop any numbness, increased pain, skin changes.
Never smoker

## 2023-05-24 ENCOUNTER — HOSPITAL ENCOUNTER (OUTPATIENT)
Dept: PHYSICAL THERAPY | Age: 38
Setting detail: THERAPIES SERIES
Discharge: HOME | End: 2023-05-24
Payer: COMMERCIAL

## 2023-05-24 PROCEDURE — 97110 THERAPEUTIC EXERCISES: CPT

## 2023-05-24 PROCEDURE — 97140 MANUAL THERAPY 1/> REGIONS: CPT

## 2023-05-24 NOTE — FLOWSHEET NOTE
[x] [facility]) - [facility] & Therapy  [address] [facility] [address]  [place]: [phone]   F: [phone]    Physical Therapy Daily Treatment Note      Date:  23   Patient Name:  Loi Vega    :  1985  MRN: [id number]  Physician: Helene Ordonez MD                                   Insurance: [address] [place] [address] through  12 visits [phone], [id number], [phone]  Medical Diagnosis: [phone] closed fracture of (R) clavicle, s/p open reduction and internal fixation Z98.890             Rehab Codes: (R) shoulder pain M25.511; weakness (R) M25.351  Onset Date: 3/31/23   Referral , surgery 3/21/23 fall 3/17/23         Next 's appt: 23  Visit Count:                                 Cancel/No Show:     Subjective:  Patient late today for appointment and states he was to walk to PT-difficulty getting up and moving in the morning. States pain has been minimal- denies issues reaching behind back or overhead washing hair; feels reaching overhead has improved. Reports he has been doing wall slides for stretching but minimal compliance with other exercise/stretching as he doesn't have much room at his place. Difficulty sleeping due to pain. Pain:  [x] Yes  [] No Location: R shoulder- GH joint line to mid clavicular region- occasionally out to [facility] joint  Pain Rating: (0-10 scale) 2/10  Pain altered Tx:  [x] No  [] Yes  Action:     Comments:    Objective:   Modalities:   Precautions:Patient currently allowed to do AROM and AAROM but physician does not want resisted exercises until 1 month per most recent referral (23) (ONE MONTH-  23)  Acute displaced mid to distal right clavicular fracture measuring approximately 2 cm of displacement  **Watch for vertigo in supine - Hx of hydrocephalus; multiple brain surgeries;  Dx at 16 y/o- mornings worse than afternoons**  Exercises:  Exercise Reps/ Time Weight/ Level Completed  23  Comments   Pulley's Flex/Abd 2' each  x

## 2023-05-29 DIAGNOSIS — S42.031A CLOSED DISPLACED FRACTURE OF ACROMIAL END OF RIGHT CLAVICLE, INITIAL ENCOUNTER: ICD-10-CM

## 2023-05-30 RX ORDER — DICLOFENAC SODIUM 75 MG/1
TABLET, DELAYED RELEASE ORAL
Qty: 28 TABLET | Refills: 0 | Status: SHIPPED | OUTPATIENT
Start: 2023-05-30

## 2023-05-30 NOTE — TELEPHONE ENCOUNTER
Patient requesting refill on Voltaren 75 mg for Closed displaced fracture of acromial end of right clavicle.     Patient has had 2 scripts:   3/29/23 & 5/1/23

## 2023-06-01 ENCOUNTER — HOSPITAL ENCOUNTER (OUTPATIENT)
Dept: PHYSICAL THERAPY | Age: 38
Setting detail: THERAPIES SERIES
Discharge: HOME | End: 2023-06-01
Payer: MEDICAID

## 2023-06-01 NOTE — FLOWSHEET NOTE
[x] [facility]) - [facility] & Therapy  [address] [facility] [address]  [place]: [phone]   F: [phone]     Physical Therapy Cancel/No Show note    Date: 2023  Patient: Mehrdad Moreno  : 1985  MRN: [id number]    Visit Count:   Cancels/No Shows to date:     For today's appointment patient:    [x]  Cancelled    [] Rescheduled appointment    [] No-show     Reason given by patient:    []  Patient ill    []  Conflicting appointment    [] No transportation      [] Conflict with work    [] No reason given    [] Weather related    [] COVID-19    [x] Other:      Comments:  Patient arrived 3 hrs early for appt. PTA able to accommodate to see pt at 1045 rather than have pt wait til scheduled time. Pt left clinic around [phone], stating he did not want to wait any longer and confirmed next appt.        [x] Next appointment was confirmed    Electronically signed by: Chiquita Levine PTA

## 2023-06-02 ENCOUNTER — HOSPITAL ENCOUNTER (OUTPATIENT)
Dept: PHYSICAL THERAPY | Age: 38
Setting detail: THERAPIES SERIES
Discharge: HOME | End: 2023-06-02
Payer: MEDICAID

## 2023-06-02 PROCEDURE — 97140 MANUAL THERAPY 1/> REGIONS: CPT

## 2023-06-02 PROCEDURE — 97110 THERAPEUTIC EXERCISES: CPT

## 2023-06-02 PROCEDURE — 97112 NEUROMUSCULAR REEDUCATION: CPT

## 2023-06-02 NOTE — FLOWSHEET NOTE
[x] [facility]) - [facility] & Therapy  [address] [facility] [address]  [place]: [phone]   F: [phone]    Physical Therapy Daily Treatment Note      Date:  23   Patient Name:  Vidal Madison    :  1985  MRN: [id number]  Physician: Cal Vargas MD                                   Insurance: [address] [place] [address] auth through  12 visits [phone], [phone], [id number]  Medical Diagnosis: [phone] closed fracture of (R) clavicle, s/p open reduction and internal fixation Z98.890             Rehab Codes: (R) shoulder pain M25.511; weakness (R) M25.351  Onset Date: 3/31/23   Referral , surgery 3/21/23 fall 3/17/23         Next 's appt: 23  Visit Count:                                 Cancel/No Show: 2    Subjective:  Patient noting improved overall symptoms. Able to actively reach overhead without significant pain, does note stiffness with this. Past restriction of no resisted exercises and able to add light resistance per most recent MD appointment. Also sees physician next week for regularly scheduled follow up. Pain:  [x] Yes  [] No Location: R shoulder- GH joint line to mid clavicular region- [facility]  Pain Rating: (0-10 scale) 2/10  Pain altered Tx:  [x] No  [] Yes  Action:     Comments:    Objective:   Modalities:   Precautions:Patient currently allowed to do AROM and AAROM but physician does not want resisted exercises until 1 month per most recent referral (23) (ONE MONTH-  23)  Acute displaced mid to distal right clavicular fracture measuring approximately 2 cm of displacement  **Watch for vertigo in supine - Hx of hydrocephalus; multiple brain surgeries;  Dx at 16 y/o- mornings worse than afternoons**  Exercises:  Exercise Reps/ Time Weight/ Level Completed  23  Comments   Pulley's Flex/Abd 2' each  x    UBE Fwd/Retro 2'30\"/ 2'30\"  X Warm up collecting subjective          Seated (R) upper trap stretch 2x30\"      Doorway pec

## 2023-06-05 RX ORDER — PREDNISONE 20 MG/1
TABLET ORAL
Qty: 18 TABLET | Refills: 0 | Status: SHIPPED | OUTPATIENT
Start: 2023-06-05

## 2023-06-06 ENCOUNTER — HOSPITAL ENCOUNTER (OUTPATIENT)
Dept: PHYSICAL THERAPY | Age: 38
Setting detail: THERAPIES SERIES
Discharge: HOME | End: 2023-06-06
Payer: MEDICAID

## 2023-06-06 PROCEDURE — 97110 THERAPEUTIC EXERCISES: CPT

## 2023-06-06 NOTE — FLOWSHEET NOTE
Education:  [x] Yes  [] No  [x] Reviewed Prior HEP/Ed Importance of stretching; avoidance of resistance/lifting per Dr recommendations  Method of Education: [x] Verbal  [x] Demo  [] Written  Comprehension of Education:  [x] Verbalizes understanding. [x] Demonstrates understanding. [x] Needs review. [] Demonstrates/verbalizes HEP/Ed previously given. Given 6/2/23 3x/week with update of HEP and updated lifting of restriction of resisted exercises  Theraband rows 2 x 10 red     Theraband extension 2 x 10 red     TBAND IR/ER 2x 10 red     TBAND horizontal abduction 2 x 10 red          Plan: [x] Continue per plan of care. [x] Other: Plan addition of additional strengthening exercises within patient pain tolerance.   Plan progress note at next treatment session with PT      Treatment Charges: Mins Units   []  Modalities     [x]  Ther Exercise 42 3   []  Manual Therapy     []  Ther Activities     []  Aquatics     []  Neuromuscular     [] Vasocompression     [] Gait Training     [] Dry needling        [] 1 or 2 muscles        [] 3 or more muscles     []  Other     Total Treatment time 42 3     Time In: 10:24 am  Time Out: 8785 PM    Electronically signed by:  Ramu Mishra PTA

## 2023-06-08 ENCOUNTER — HOSPITAL ENCOUNTER (OUTPATIENT)
Dept: PHYSICAL THERAPY | Age: 38
Setting detail: THERAPIES SERIES
Discharge: HOME | End: 2023-06-08
Payer: MEDICAID

## 2023-06-08 PROCEDURE — 97110 THERAPEUTIC EXERCISES: CPT

## 2023-06-08 PROCEDURE — 97140 MANUAL THERAPY 1/> REGIONS: CPT

## 2023-06-08 PROCEDURE — 97112 NEUROMUSCULAR REEDUCATION: CPT

## 2023-06-08 NOTE — FLOWSHEET NOTE
first level of cabinets, lifting/carrying up to 15# unilaterally  Independent with Home Exercise Programs                    Patient goals:range of motion    Pt. Education:  [x] Yes  [] No  [x] Reviewed Prior HEP/Ed Importance of stretching; avoidance of resistance/lifting per Dr recommendations  Method of Education: [x] Verbal  [x] Demo  [] Written  Comprehension of Education:  [x] Verbalizes understanding. [x] Demonstrates understanding. [x] Needs review. [] Demonstrates/verbalizes HEP/Ed previously given. Given 6/2/23 3x/week with update of HEP and updated lifting of restriction of resisted exercises  Theraband rows 2 x 10 red     Theraband extension 2 x 10 red     TBAND IR/ER 2x 10 red     TBAND horizontal abduction 2 x 10 red          Plan: [x] Continue per plan of care. [x] Other: Plan addition of additional strengthening exercises within patient pain tolerance.   Plan progress note at next treatment session with PT      Treatment Charges: Mins Units   []  Modalities     [x]  Ther Exercise 35 2   []  Manual Therapy     []  Ther Activities     []  Aquatics     []  Neuromuscular 10 1   [] Vasocompression     [] Gait Training     [] Dry needling        [] 1 or 2 muscles        [] 3 or more muscles     []  Other     Total Treatment time 45 3     Time In: 1200 pm  Time Out: 1245pm    Electronically signed by:  Cliff Jon, PT

## 2023-06-20 DIAGNOSIS — S42.031A CLOSED DISPLACED FRACTURE OF ACROMIAL END OF RIGHT CLAVICLE, INITIAL ENCOUNTER: ICD-10-CM

## 2023-06-21 ENCOUNTER — HOSPITAL ENCOUNTER (OUTPATIENT)
Dept: PHYSICAL THERAPY | Age: 38
Setting detail: THERAPIES SERIES
Discharge: HOME | End: 2023-06-21
Payer: MEDICAID

## 2023-06-21 PROCEDURE — 97110 THERAPEUTIC EXERCISES: CPT

## 2023-06-21 PROCEDURE — 97112 NEUROMUSCULAR REEDUCATION: CPT

## 2023-06-21 NOTE — TELEPHONE ENCOUNTER
Patient requesting refill on Voltaren 75 mg for Closed displaced fracture of acromial end of right clavicle.     Patient has had 3 scripts:   3/29/23 , 5/1/23 & 5/29

## 2023-06-22 RX ORDER — DICLOFENAC SODIUM 75 MG/1
TABLET, DELAYED RELEASE ORAL
Qty: 28 TABLET | Refills: 0 | Status: SHIPPED | OUTPATIENT
Start: 2023-06-22

## 2023-06-22 NOTE — FLOWSHEET NOTE
[x] [facility]) - [facility]  [address]  [place]: [phone]   F: [phone]    Physical Therapy Daily Treatment Note/Progress note/Discharge       Date:  23   Patient Name:  Keith Anguiano    :  1985  MRN: [id number]  Physician: Jill Lewis MD                                   Insurance: [employer] [facility] through  12 visits [phone], [phone], [phone]  Medical Diagnosis: ([phone] closed fracture of (R) clavicle, s/p open reduction and internal fixation Z98.890             Rehab Codes: (R) shoulder pain M25.511; weakness (R) M25.351  Onset Date: 3/31/23   Referral , surgery 3/21/23 fall 3/17/23         Next 's appt: 23  Visit Count:                                 Cancel/No Show:   Reporting period 23-23    Subjective:  Patient has made excellent progress overall. Plan discharge to [facility] at this time- sees physician on 23- with good overall improvement of symptoms and good prognosis for full function. Full ROM, has returned generally to improved functional lifting/carrying of groceries, carrying case of pop. Notes mild limitations with laying on affected side, end range ROM, but improved and generally equal strength testing with clinical testing. Pain:  [x] Yes  [] No Location: R shoulder- GH joint line to mid clavicular region- occasionally out to [facility] joint  Pain Rating: (0-10 scale) 0/10  Pain altered Tx:  [x] No  [] Yes  Action:     Comments:    Objective:   Modalities:   Precautions:Patient currently allowed to do AROM and AAROM but physician does not want resisted exercises until 1 month per most recent referral (23) (ONE MONTH-  23)  Acute displaced mid to distal right clavicular fracture measuring approximately 2 cm of displacement  **Watch for vertigo in supine - Hx of hydrocephalus; multiple brain surgeries;  Dx at 14 y/o- mornings worse than afternoons**  Exercises:  Exercise Reps/ Time

## 2023-06-23 ENCOUNTER — OFFICE VISIT (OUTPATIENT)
Dept: ORTHOPEDIC SURGERY | Age: 38
End: 2023-06-23

## 2023-06-23 VITALS — HEIGHT: 74 IN | RESPIRATION RATE: 14 BRPM | BODY MASS INDEX: 26.82 KG/M2 | WEIGHT: 209 LBS

## 2023-06-23 DIAGNOSIS — Z87.81 STATUS POST OPEN REDUCTION AND INTERNAL FIXATION (ORIF) OF FRACTURE: Primary | ICD-10-CM

## 2023-06-23 DIAGNOSIS — S42.031A CLOSED DISPLACED FRACTURE OF ACROMIAL END OF RIGHT CLAVICLE, INITIAL ENCOUNTER: ICD-10-CM

## 2023-06-23 DIAGNOSIS — Z98.890 STATUS POST OPEN REDUCTION AND INTERNAL FIXATION (ORIF) OF FRACTURE: Primary | ICD-10-CM

## 2023-06-23 NOTE — PROGRESS NOTES
Procedure: Right distal clavicle fracture ORIF  Date of procedure: 3/21/2023    HPI: Mr. Belinda Langley is a 39-year-old who is approximately 3 months status post the aforementioned procedure. He states that he is doing well but still has some pain in the shoulder around the fracture site. He had his last appointment with physical therapy a couple days ago. Physical examination:  Evaluation of his right shoulder and upper extremity demonstrates his incision to be appropriately healed. No swelling noted at this time. He is mildly tender to palpation along the distal end of the clavicle. He has a 2+ radial pulse with brisk cap refill in his fingers. He has approximately 135 degrees of active shoulder elevation, 140 degrees of abduction and 60 degrees of external rotation. He has 5/5 muscle strength with resisted deltoid, supraspinatus, external rotation and internal rotation testing. Imaging studies:  2 x-ray views of the right clavicle completed on 6/23/2023 were reviewed independently demonstrating a distal clavicle fracture in acceptable alignment with superior plate and screw construct fixation. No obvious loosening or migration of his implants. There appears to have been interval increase in consolidation across the fracture site. Impression and plan: Mr. Belinda Langley is a 39-year-old who is approximately 3 months status post a right distal clavicle fracture ORIF. He appears to be doing relatively well clinically and radiographically. At this time I encouraged him to keep up with his exercises from therapy and may continue to use his arm for daily activities as he feels comfortable. I will see him back for reevaluation in 3 months but he was encouraged to return or call earlier with questions and or concerns.

## 2023-07-20 ENCOUNTER — TELEPHONE (OUTPATIENT)
Dept: NEUROLOGY | Age: 38
End: 2023-07-20

## 2023-07-20 ENCOUNTER — OFFICE VISIT (OUTPATIENT)
Dept: NEUROLOGY | Age: 38
End: 2023-07-20
Payer: MEDICAID

## 2023-07-20 VITALS
WEIGHT: 195 LBS | SYSTOLIC BLOOD PRESSURE: 107 MMHG | BODY MASS INDEX: 25.03 KG/M2 | HEART RATE: 62 BPM | DIASTOLIC BLOOD PRESSURE: 76 MMHG | HEIGHT: 74 IN

## 2023-07-20 DIAGNOSIS — G43.711 INTRACTABLE CHRONIC MIGRAINE WITHOUT AURA AND WITH STATUS MIGRAINOSUS: ICD-10-CM

## 2023-07-20 DIAGNOSIS — G44.221 CHRONIC TENSION-TYPE HEADACHE, INTRACTABLE: ICD-10-CM

## 2023-07-20 DIAGNOSIS — G91.8 CHRONIC BRAIN-HYDROCEPHALUS SYNDROME (HCC): Primary | ICD-10-CM

## 2023-07-20 PROCEDURE — 99214 OFFICE O/P EST MOD 30 MIN: CPT | Performed by: PSYCHIATRY & NEUROLOGY

## 2023-07-20 PROCEDURE — G8427 DOCREV CUR MEDS BY ELIG CLIN: HCPCS | Performed by: PSYCHIATRY & NEUROLOGY

## 2023-07-20 PROCEDURE — 4004F PT TOBACCO SCREEN RCVD TLK: CPT | Performed by: PSYCHIATRY & NEUROLOGY

## 2023-07-20 PROCEDURE — G8417 CALC BMI ABV UP PARAM F/U: HCPCS | Performed by: PSYCHIATRY & NEUROLOGY

## 2023-07-20 RX ORDER — TOPIRAMATE 50 MG/1
TABLET, FILM COATED ORAL
Qty: 60 TABLET | Refills: 5 | Status: SHIPPED | OUTPATIENT
Start: 2023-07-20

## 2023-07-20 RX ORDER — SODIUM CHLORIDE 9 MG/ML
5-250 INJECTION, SOLUTION INTRAVENOUS PRN
OUTPATIENT
Start: 2023-07-20

## 2023-07-20 RX ORDER — ACETAMINOPHEN 325 MG/1
650 TABLET ORAL
OUTPATIENT
Start: 2023-07-20

## 2023-07-20 RX ORDER — TOPIRAMATE 100 MG/1
TABLET, FILM COATED ORAL
Qty: 60 TABLET | Refills: 5 | Status: SHIPPED | OUTPATIENT
Start: 2023-07-20

## 2023-07-20 RX ORDER — EPINEPHRINE 1 MG/ML
0.3 INJECTION, SOLUTION, CONCENTRATE INTRAVENOUS PRN
OUTPATIENT
Start: 2023-07-20

## 2023-07-20 RX ORDER — HEPARIN SODIUM (PORCINE) LOCK FLUSH IV SOLN 100 UNIT/ML 100 UNIT/ML
500 SOLUTION INTRAVENOUS PRN
OUTPATIENT
Start: 2023-07-20

## 2023-07-20 RX ORDER — ONDANSETRON 2 MG/ML
8 INJECTION INTRAMUSCULAR; INTRAVENOUS
OUTPATIENT
Start: 2023-07-20

## 2023-07-20 RX ORDER — ATOGEPANT 60 MG/1
1 TABLET ORAL DAILY
Qty: 30 TABLET | Refills: 0 | Status: SHIPPED | OUTPATIENT
Start: 2023-07-20 | End: 2023-10-18

## 2023-07-20 RX ORDER — ALBUTEROL SULFATE 90 UG/1
4 AEROSOL, METERED RESPIRATORY (INHALATION) PRN
OUTPATIENT
Start: 2023-07-20

## 2023-07-20 RX ORDER — DIPHENHYDRAMINE HYDROCHLORIDE 50 MG/ML
50 INJECTION INTRAMUSCULAR; INTRAVENOUS
OUTPATIENT
Start: 2023-07-20

## 2023-07-20 RX ORDER — SODIUM CHLORIDE 0.9 % (FLUSH) 0.9 %
5-40 SYRINGE (ML) INJECTION PRN
OUTPATIENT
Start: 2023-07-20

## 2023-07-20 RX ORDER — SODIUM CHLORIDE 9 MG/ML
INJECTION, SOLUTION INTRAVENOUS CONTINUOUS
OUTPATIENT
Start: 2023-07-20

## 2023-07-20 RX ORDER — FAMOTIDINE 10 MG/ML
20 INJECTION, SOLUTION INTRAVENOUS
OUTPATIENT
Start: 2023-07-20

## 2023-07-20 NOTE — TELEPHONE ENCOUNTER
Dr Callie Weathers saw Manderson Sin today and would like him to initiate Vyepti for his chronic migraines. He would go to [facility]. Therapy plan started. Pt. is to stop Erving Slovak.

## 2023-07-20 NOTE — PROGRESS NOTES
[facility]  [address], [address] [place] [address]. [address]  Ph: [phone] or [phone]  FAX: [phone]    Chief Complaint:      Dear JAMAR Zendejas CNP     I had the pleasure of seeing your patient today in neurology consultation for his symptoms. As you would recall Alberto Zamora is a 43 y.o. male. Patient has previously been seen by Jace Branch CNP for treatment of chronic migraine and intermittent diplopia. The patient has intermittent diplopia secondary to hydrocephalus syndrome which is chronic. The patient developed a mastoid infection at the age of 11. In 2001, he had his first ventriculoperitoneal shunt. In the years following, he had four subsequent shunt revisions. The patient has had multiple MRIs over the years, showing stability of his ventricles and shunt placement. Patient has been maintained on Topamax 150 mg BID as well as qulipta 60 mg daily for headache prophylaxis. He was last seen 3/14/23, and presents 7/20/23 for continuing neurological care. Patient had a recent emergency visit 3/25/23 where he broke his right clavicle. He reports that he continues to experience daily headaches, and they occur roughly 15 days a month. He experiences 8-12 headache-free days in a month. Patient reports that he avoids abortive medications, however will occasionally take tylenol if his migraines become unbearable. He additionally finds relief laying down. Patient has previously tried Botox injections as well as Aimovig, however did not receive these well due to pain. We discussed several options going forward, including Vyepti infusions every three months.        Current prophylactic medication Dosage   Topamax 150 mg BID   qulipta 60 mg daily           Previous prophylactic medications Reason for discontinuation   notriptyline    amitriptyline    aimovig    botox    imitrex    fioricet      Previous Abortive medications Reason for discontinuation

## 2023-07-27 ENCOUNTER — PATIENT MESSAGE (OUTPATIENT)
Dept: NEUROLOGY | Age: 38
End: 2023-07-27

## 2023-08-07 ENCOUNTER — TELEPHONE (OUTPATIENT)
Dept: NEUROLOGY | Age: 38
End: 2023-08-07

## 2023-08-17 ENCOUNTER — HOSPITAL ENCOUNTER (OUTPATIENT)
Dept: INFUSION THERAPY | Age: 38
Setting detail: INFUSION SERIES
Discharge: HOME | End: 2023-08-17
Payer: MEDICAID

## 2023-08-17 VITALS
RESPIRATION RATE: 16 BRPM | SYSTOLIC BLOOD PRESSURE: 101 MMHG | HEART RATE: 71 BPM | TEMPERATURE: 97.2 F | OXYGEN SATURATION: 97 % | DIASTOLIC BLOOD PRESSURE: 66 MMHG

## 2023-08-17 DIAGNOSIS — G43.711 INTRACTABLE CHRONIC MIGRAINE WITHOUT AURA AND WITH STATUS MIGRAINOSUS: Primary | ICD-10-CM

## 2023-08-17 PROCEDURE — 2580000003 HC RX 258: Performed by: PSYCHIATRY & NEUROLOGY

## 2023-08-17 PROCEDURE — 96365 THER/PROPH/DIAG IV INF INIT: CPT

## 2023-08-17 PROCEDURE — 6360000002 HC RX W HCPCS: Performed by: PSYCHIATRY & NEUROLOGY

## 2023-08-17 RX ORDER — DIPHENHYDRAMINE HYDROCHLORIDE 50 MG/ML
50 INJECTION INTRAMUSCULAR; INTRAVENOUS
OUTPATIENT
Start: 2023-11-09

## 2023-08-17 RX ORDER — ACETAMINOPHEN 325 MG/1
650 TABLET ORAL
OUTPATIENT
Start: 2023-11-09

## 2023-08-17 RX ORDER — SODIUM CHLORIDE 0.9 % (FLUSH) 0.9 %
5-40 SYRINGE (ML) INJECTION PRN
OUTPATIENT
Start: 2023-11-09

## 2023-08-17 RX ORDER — HEPARIN 100 UNIT/ML
500 SYRINGE INTRAVENOUS PRN
OUTPATIENT
Start: 2023-11-09

## 2023-08-17 RX ORDER — ONDANSETRON 2 MG/ML
8 INJECTION INTRAMUSCULAR; INTRAVENOUS
OUTPATIENT
Start: 2023-11-09

## 2023-08-17 RX ORDER — SODIUM CHLORIDE 9 MG/ML
5-250 INJECTION, SOLUTION INTRAVENOUS PRN
OUTPATIENT
Start: 2023-11-09

## 2023-08-17 RX ORDER — ALBUTEROL SULFATE 90 UG/1
4 AEROSOL, METERED RESPIRATORY (INHALATION) PRN
OUTPATIENT
Start: 2023-11-09

## 2023-08-17 RX ORDER — SODIUM CHLORIDE 9 MG/ML
INJECTION, SOLUTION INTRAVENOUS CONTINUOUS
OUTPATIENT
Start: 2023-11-09

## 2023-08-17 RX ORDER — EPINEPHRINE 1 MG/ML
0.3 INJECTION, SOLUTION, CONCENTRATE INTRAVENOUS PRN
OUTPATIENT
Start: 2023-11-09

## 2023-08-17 RX ADMIN — EPTINEZUMAB-JJMR 100 MG: 100 INJECTION INTRAVENOUS at 10:53

## 2023-08-17 NOTE — PROGRESS NOTES
Pt arrived ambulatory to outpatient infusion. Vitals completed. IV inserted RFA, 22g, without any complications. Infusion began at 1053 and completed at 1124. Pt tolerated well. Vitals completed, no adverse reactions noted. IV removed per policy. Pt ambulatory at discharge.

## 2023-08-21 ENCOUNTER — CLINICAL DOCUMENTATION (OUTPATIENT)
Facility: HOSPITAL | Age: 38
End: 2023-08-21

## 2023-08-23 ENCOUNTER — PATIENT MESSAGE (OUTPATIENT)
Dept: NEUROLOGY | Age: 38
End: 2023-08-23

## 2023-08-30 NOTE — TELEPHONE ENCOUNTER
Dr. Talon Salcedo I don't see the referral to a headache specialist.  Where did you want to send Mr. Charlotte Sesay?

## 2023-09-25 ENCOUNTER — OFFICE VISIT (OUTPATIENT)
Dept: ORTHOPEDIC SURGERY | Age: 38
End: 2023-09-25

## 2023-09-25 VITALS — HEIGHT: 74 IN | WEIGHT: 196 LBS | BODY MASS INDEX: 25.15 KG/M2 | RESPIRATION RATE: 14 BRPM

## 2023-09-25 DIAGNOSIS — S42.031A CLOSED DISPLACED FRACTURE OF ACROMIAL END OF RIGHT CLAVICLE, INITIAL ENCOUNTER: ICD-10-CM

## 2023-09-25 DIAGNOSIS — Z87.81 STATUS POST OPEN REDUCTION AND INTERNAL FIXATION (ORIF) OF FRACTURE: Primary | ICD-10-CM

## 2023-09-25 DIAGNOSIS — Z98.890 STATUS POST OPEN REDUCTION AND INTERNAL FIXATION (ORIF) OF FRACTURE: Primary | ICD-10-CM

## 2023-09-29 NOTE — PROGRESS NOTES
Procedure: Right distal clavicle fracture ORIF  Date of procedure: 3/21/2023    HPI: Mr. Scott Cuevas is a 78-year-old who is approximately 6 months status post the aforementioned procedure. He states today that he continues to have some pain about the shoulder mostly in the morning when he wakes up. Physical examination:  Evaluation of his right shoulder and upper extremity demonstrates his incision to be appropriately healed. No swelling noted at this time. He is mildly tender to palpation along the distal end of the clavicle. He has a 2+ radial pulse with brisk cap refill in his fingers. He has approximately 145 degrees of active shoulder elevation, 145 degrees of abduction and 60 degrees of external rotation. He has 5/5 muscle strength with resisted deltoid, supraspinatus, external rotation and internal rotation testing. Imaging studies:  2 x-ray views of the right clavicle completed on 9/29/2023 were reviewed independently demonstrating a distal clavicle fracture in acceptable alignment with superior plate and screw construct fixation. No obvious loosening or migration of his implants. There appears to be complete consolidation across the fracture site. Impression and plan: Mr. Scott Cuevas is a 78-year-old who is approximately 6 months status post a right distal clavicle fracture ORIF. He continues to have some pain at this time involving the distal end of the clavicle. I had a discussion with the patient today about this. We discussed the possibility of this being from the plate or an incomplete healing of the fracture. To better clarify did recommend a CT scan of the shoulder focus on the clavicle. Should he have complete [place] we can discuss removing his implant if not we will have to address the incomplete healing of the distal clavicle.

## 2023-10-04 ENCOUNTER — HOSPITAL ENCOUNTER (OUTPATIENT)
Dept: CT IMAGING | Age: 38
Discharge: HOME | End: 2023-10-06
Attending: ORTHOPAEDIC SURGERY
Payer: MEDICAID

## 2023-10-04 DIAGNOSIS — Z98.890 STATUS POST OPEN REDUCTION AND INTERNAL FIXATION (ORIF) OF FRACTURE: ICD-10-CM

## 2023-10-04 DIAGNOSIS — Z87.81 STATUS POST OPEN REDUCTION AND INTERNAL FIXATION (ORIF) OF FRACTURE: ICD-10-CM

## 2023-10-04 DIAGNOSIS — S42.031A CLOSED DISPLACED FRACTURE OF ACROMIAL END OF RIGHT CLAVICLE, INITIAL ENCOUNTER: ICD-10-CM

## 2023-10-04 PROCEDURE — 73200 CT UPPER EXTREMITY W/O DYE: CPT

## 2023-11-06 DIAGNOSIS — S42.031A CLOSED DISPLACED FRACTURE OF ACROMIAL END OF RIGHT CLAVICLE, INITIAL ENCOUNTER: ICD-10-CM

## 2023-11-10 RX ORDER — DICLOFENAC SODIUM 75 MG/1
TABLET, DELAYED RELEASE ORAL
Qty: 28 TABLET | Refills: 0 | OUTPATIENT
Start: 2023-11-10

## 2023-11-17 ENCOUNTER — HOSPITAL ENCOUNTER (OUTPATIENT)
Dept: INFUSION THERAPY | Age: 38
Setting detail: INFUSION SERIES
Discharge: HOME | End: 2023-11-17
Payer: MEDICAID

## 2023-11-17 VITALS
HEART RATE: 80 BPM | RESPIRATION RATE: 17 BRPM | TEMPERATURE: 97 F | DIASTOLIC BLOOD PRESSURE: 73 MMHG | SYSTOLIC BLOOD PRESSURE: 106 MMHG | OXYGEN SATURATION: 95 %

## 2023-11-17 DIAGNOSIS — G43.711 INTRACTABLE CHRONIC MIGRAINE WITHOUT AURA AND WITH STATUS MIGRAINOSUS: Primary | ICD-10-CM

## 2023-11-17 PROCEDURE — 6360000002 HC RX W HCPCS: Performed by: PSYCHIATRY & NEUROLOGY

## 2023-11-17 PROCEDURE — 2580000003 HC RX 258: Performed by: PSYCHIATRY & NEUROLOGY

## 2023-11-17 PROCEDURE — 96365 THER/PROPH/DIAG IV INF INIT: CPT

## 2023-11-17 RX ORDER — ONDANSETRON 2 MG/ML
8 INJECTION INTRAMUSCULAR; INTRAVENOUS
OUTPATIENT
Start: 2024-02-02

## 2023-11-17 RX ORDER — DIPHENHYDRAMINE HYDROCHLORIDE 50 MG/ML
50 INJECTION INTRAMUSCULAR; INTRAVENOUS
OUTPATIENT
Start: 2024-02-02

## 2023-11-17 RX ORDER — ACETAMINOPHEN 325 MG/1
650 TABLET ORAL
OUTPATIENT
Start: 2024-02-02

## 2023-11-17 RX ORDER — HEPARIN 100 UNIT/ML
500 SYRINGE INTRAVENOUS PRN
OUTPATIENT
Start: 2024-02-02

## 2023-11-17 RX ORDER — SODIUM CHLORIDE 9 MG/ML
INJECTION, SOLUTION INTRAVENOUS CONTINUOUS
OUTPATIENT
Start: 2024-02-02

## 2023-11-17 RX ORDER — SODIUM CHLORIDE 9 MG/ML
5-250 INJECTION, SOLUTION INTRAVENOUS PRN
OUTPATIENT
Start: 2024-02-02

## 2023-11-17 RX ORDER — SODIUM CHLORIDE 0.9 % (FLUSH) 0.9 %
5-40 SYRINGE (ML) INJECTION PRN
OUTPATIENT
Start: 2024-02-02

## 2023-11-17 RX ORDER — EPINEPHRINE 1 MG/ML
0.3 INJECTION, SOLUTION, CONCENTRATE INTRAVENOUS PRN
OUTPATIENT
Start: 2024-02-02

## 2023-11-17 RX ORDER — ALBUTEROL SULFATE 90 UG/1
4 AEROSOL, METERED RESPIRATORY (INHALATION) PRN
OUTPATIENT
Start: 2024-02-02

## 2023-11-17 RX ADMIN — EPTINEZUMAB-JJMR 100 MG: 100 INJECTION INTRAVENOUS at 11:28

## 2023-11-17 NOTE — PROGRESS NOTES
Pt seen this date for vyepti infusion. VS obtained and IV started in R AC. Infusion began at 1128 and ended at 1211. Pt tolerated infusion well and discharged home.

## 2023-11-21 ENCOUNTER — TELEPHONE (OUTPATIENT)
Dept: NEUROLOGY | Age: 38
End: 2023-11-21

## 2023-11-21 ENCOUNTER — OFFICE VISIT (OUTPATIENT)
Dept: NEUROLOGY | Age: 38
End: 2023-11-21
Payer: MEDICAID

## 2023-11-21 VITALS
HEIGHT: 74 IN | WEIGHT: 205 LBS | BODY MASS INDEX: 26.31 KG/M2 | SYSTOLIC BLOOD PRESSURE: 112 MMHG | DIASTOLIC BLOOD PRESSURE: 77 MMHG | HEART RATE: 68 BPM

## 2023-11-21 DIAGNOSIS — G91.9 HYDROCEPHALUS WITH OPERATING SHUNT (HCC): ICD-10-CM

## 2023-11-21 DIAGNOSIS — G91.8 CHRONIC BRAIN-HYDROCEPHALUS SYNDROME (HCC): ICD-10-CM

## 2023-11-21 DIAGNOSIS — G44.221 CHRONIC TENSION-TYPE HEADACHE, INTRACTABLE: Primary | ICD-10-CM

## 2023-11-21 DIAGNOSIS — G43.711 INTRACTABLE CHRONIC MIGRAINE WITHOUT AURA AND WITH STATUS MIGRAINOSUS: ICD-10-CM

## 2023-11-21 PROCEDURE — 99214 OFFICE O/P EST MOD 30 MIN: CPT | Performed by: PSYCHIATRY & NEUROLOGY

## 2023-11-21 PROCEDURE — G8484 FLU IMMUNIZE NO ADMIN: HCPCS | Performed by: PSYCHIATRY & NEUROLOGY

## 2023-11-21 PROCEDURE — G8417 CALC BMI ABV UP PARAM F/U: HCPCS | Performed by: PSYCHIATRY & NEUROLOGY

## 2023-11-21 PROCEDURE — 4004F PT TOBACCO SCREEN RCVD TLK: CPT | Performed by: PSYCHIATRY & NEUROLOGY

## 2023-11-21 PROCEDURE — G8427 DOCREV CUR MEDS BY ELIG CLIN: HCPCS | Performed by: PSYCHIATRY & NEUROLOGY

## 2023-11-21 RX ORDER — RIMEGEPANT SULFATE 75 MG/75MG
75 TABLET, ORALLY DISINTEGRATING ORAL PRN
Qty: 30 TABLET | Refills: 1 | Status: SHIPPED | OUTPATIENT
Start: 2023-11-21 | End: 2023-12-21

## 2023-11-21 RX ORDER — PREDNISONE 20 MG/1
TABLET ORAL
Qty: 18 TABLET | Refills: 0 | Status: SHIPPED | OUTPATIENT
Start: 2023-11-21

## 2023-11-21 NOTE — PROGRESS NOTES
[facility]  [address], [address] [place] [address]. [address]  Ph: [phone] or [phone]  FAX: [phone]    Chief Complaint: Headaches     Dear JAMAR Nunez CNP     I had the pleasure of seeing your patient today in neurology consultation for his symptoms. As you would recall Ana Lechuga is a 43 y.o. male. Patient has previously been seen by Nubia Su CNP for treatment of chronic migraine and intermittent diplopia. The patient has intermittent diplopia secondary to hydrocephalus syndrome which is chronic. The patient developed a mastoid infection at the age of 11. In 2001, he had his first ventriculoperitoneal shunt. In the years following, he had four subsequent shunt revisions. The patient has had multiple MRIs over the years, showing stability of his ventricles and shunt placement. Patient has been maintained on Topamax 150 mg BID as well as qulipta 60 mg daily for headache prophylaxis. He was last seen 3/14/23, and presents 7/20/23 for continuing neurological care. Patient had a recent emergency visit 3/25/23 where he broke his right clavicle. He reports that he continues to experience daily headaches, and they occur roughly 15 days a month. He experiences 8-12 headache-free days in a month. Patient reports that he avoids abortive medications, however will occasionally take tylenol if his migraines become unbearable. He additionally finds relief laying down. Patient has previously tried Botox injections as well as Aimovig, however did not receive these well due to pain. We discussed several options going forward, including Vyepti infusions every three months. Today, the patient reports that Vyepti infusions has now improved his breakthrough migraine headaches. Patient notes he found relief of his headaches after his prednisone was tapered down. He notes no visual disturbances or memory loss.       Current prophylactic medication Dosage

## 2023-11-21 NOTE — TELEPHONE ENCOUNTER
I contacted Dr. Fiordaliza Salcedo office to get the patient scheduled, and they stated that they have tried to contact the patient twice and have been unable to get in touch with him. I contacted the patient, and advised him of this. He was given the phone number to their office, and stated he would give them a call to schedule.

## 2023-12-06 ENCOUNTER — HOSPITAL ENCOUNTER (OUTPATIENT)
Dept: MRI IMAGING | Age: 38
Discharge: HOME | End: 2023-12-08
Attending: PSYCHIATRY & NEUROLOGY
Payer: MEDICAID

## 2023-12-06 DIAGNOSIS — G44.221 CHRONIC TENSION-TYPE HEADACHE, INTRACTABLE: ICD-10-CM

## 2023-12-06 PROCEDURE — 70553 MRI BRAIN STEM W/O & W/DYE: CPT

## 2023-12-06 PROCEDURE — 6360000004 HC RX CONTRAST MEDICATION: Performed by: PSYCHIATRY & NEUROLOGY

## 2023-12-06 PROCEDURE — 2580000003 HC RX 258: Performed by: PSYCHIATRY & NEUROLOGY

## 2023-12-06 PROCEDURE — A9579 GAD-BASE MR CONTRAST NOS,1ML: HCPCS | Performed by: PSYCHIATRY & NEUROLOGY

## 2023-12-06 RX ORDER — SODIUM CHLORIDE 0.9 % (FLUSH) 0.9 %
10 SYRINGE (ML) INJECTION PRN
Status: DISCONTINUED | OUTPATIENT
Start: 2023-12-06 | End: 2023-12-09 | Stop reason: HOSPADM

## 2023-12-06 RX ADMIN — GADOTERIDOL 18 ML: 279.3 INJECTION, SOLUTION INTRAVENOUS at 12:00

## 2023-12-06 RX ADMIN — SODIUM CHLORIDE, PRESERVATIVE FREE 10 ML: 5 INJECTION INTRAVENOUS at 12:00

## 2024-02-01 ENCOUNTER — TELEPHONE (OUTPATIENT)
Dept: UROLOGY | Age: 39
End: 2024-02-01

## 2024-02-01 NOTE — TELEPHONE ENCOUNTER
Patient called in and left a voicemail about wanting to schedule a appointment. Writer attempted to call patient back and was unable to leave a message do to mailbox not being set up at this time. No message left.

## 2024-02-09 ENCOUNTER — HOSPITAL ENCOUNTER (OUTPATIENT)
Age: 39
Discharge: HOME | End: 2024-02-09
Payer: MEDICAID

## 2024-02-09 DIAGNOSIS — G44.221 CHRONIC TENSION-TYPE HEADACHE, INTRACTABLE: ICD-10-CM

## 2024-02-09 DIAGNOSIS — Z11.4 ENCOUNTER FOR SCREENING FOR HIV: ICD-10-CM

## 2024-02-09 DIAGNOSIS — Z01.89 ENCOUNTER FOR ROUTINE LABORATORY TESTING: ICD-10-CM

## 2024-02-09 DIAGNOSIS — R53.82 CHRONIC FATIGUE: ICD-10-CM

## 2024-02-09 DIAGNOSIS — E55.9 VITAMIN D DEFICIENCY: ICD-10-CM

## 2024-02-09 DIAGNOSIS — Z13.220 ENCOUNTER FOR SCREENING FOR LIPID DISORDER: ICD-10-CM

## 2024-02-09 DIAGNOSIS — D64.9 ANEMIA, UNSPECIFIED TYPE: ICD-10-CM

## 2024-02-09 DIAGNOSIS — Z00.00 ROUTINE ADULT HEALTH MAINTENANCE: ICD-10-CM

## 2024-02-09 LAB
25(OH)D3 SERPL-MCNC: 37.2 NG/ML
ALBUMIN SERPL-MCNC: 4.5 G/DL (ref 3.5–5.2)
ALP SERPL-CCNC: 116 U/L (ref 40–129)
ALT SERPL-CCNC: 14 U/L (ref 5–41)
ANION GAP SERPL CALCULATED.3IONS-SCNC: 10 MMOL/L (ref 9–17)
AST SERPL-CCNC: 21 U/L
BASOPHILS # BLD: 0.1 K/UL (ref 0–0.2)
BASOPHILS NFR BLD: 1 % (ref 0–2)
BILIRUB SERPL-MCNC: 0.3 MG/DL (ref 0.3–1.2)
BUN SERPL-MCNC: 14 MG/DL (ref 6–20)
CALCIUM SERPL-MCNC: 9.3 MG/DL (ref 8.6–10.4)
CHLORIDE SERPL-SCNC: 109 MMOL/L (ref 98–107)
CHOLEST SERPL-MCNC: 164 MG/DL
CHOLESTEROL/HDL RATIO: 3.7
CO2 SERPL-SCNC: 24 MMOL/L (ref 20–31)
CREAT SERPL-MCNC: 1 MG/DL (ref 0.7–1.2)
EOSINOPHIL # BLD: 0.4 K/UL (ref 0–0.4)
EOSINOPHILS RELATIVE PERCENT: 4 % (ref 0–4)
ERYTHROCYTE [DISTWIDTH] IN BLOOD BY AUTOMATED COUNT: 14.1 % (ref 11.5–14.9)
EST. AVERAGE GLUCOSE BLD GHB EST-MCNC: 97 MG/DL
GFR SERPL CREATININE-BSD FRML MDRD: >60 ML/MIN/1.73M2
GLUCOSE SERPL-MCNC: 101 MG/DL (ref 70–99)
HBA1C MFR BLD: 5 % (ref 4–6)
HCT VFR BLD AUTO: 46.2 % (ref 41–53)
HDLC SERPL-MCNC: 44 MG/DL
HGB BLD-MCNC: 15.1 G/DL (ref 13.5–17.5)
HIV 1+2 AB+HIV1 P24 AG SERPL QL IA: NONREACTIVE
LDLC SERPL CALC-MCNC: 101 MG/DL (ref 0–130)
LYMPHOCYTES NFR BLD: 1.7 K/UL (ref 1–4.8)
LYMPHOCYTES RELATIVE PERCENT: 21 % (ref 24–44)
MCH RBC QN AUTO: 31.9 PG (ref 26–34)
MCHC RBC AUTO-ENTMCNC: 32.7 G/DL (ref 31–37)
MCV RBC AUTO: 97.5 FL (ref 80–100)
MONOCYTES NFR BLD: 0.5 K/UL (ref 0.1–1.3)
MONOCYTES NFR BLD: 6 % (ref 1–7)
NEUTROPHILS NFR BLD: 68 % (ref 36–66)
NEUTS SEG NFR BLD: 5.6 K/UL (ref 1.3–9.1)
PLATELET # BLD AUTO: 190 K/UL (ref 150–450)
PMV BLD AUTO: 9 FL (ref 6–12)
POTASSIUM SERPL-SCNC: 3.7 MMOL/L (ref 3.7–5.3)
PROT SERPL-MCNC: 6.8 G/DL (ref 6.4–8.3)
RBC # BLD AUTO: 4.74 M/UL (ref 4.5–5.9)
SODIUM SERPL-SCNC: 143 MMOL/L (ref 135–144)
TRIGL SERPL-MCNC: 97 MG/DL
WBC OTHER # BLD: 8.3 K/UL (ref 3.5–11)

## 2024-02-09 PROCEDURE — 36415 COLL VENOUS BLD VENIPUNCTURE: CPT

## 2024-02-09 PROCEDURE — 83036 HEMOGLOBIN GLYCOSYLATED A1C: CPT

## 2024-02-09 PROCEDURE — 80053 COMPREHEN METABOLIC PANEL: CPT

## 2024-02-09 PROCEDURE — 85025 COMPLETE CBC W/AUTO DIFF WBC: CPT

## 2024-02-09 PROCEDURE — 82306 VITAMIN D 25 HYDROXY: CPT

## 2024-02-09 PROCEDURE — 80061 LIPID PANEL: CPT

## 2024-02-09 PROCEDURE — 87389 HIV-1 AG W/HIV-1&-2 AB AG IA: CPT

## 2024-02-14 ENCOUNTER — HOSPITAL ENCOUNTER (OUTPATIENT)
Age: 39
Discharge: HOME | End: 2024-02-16
Payer: MEDICAID

## 2024-02-14 ENCOUNTER — HOSPITAL ENCOUNTER (OUTPATIENT)
Dept: GENERAL RADIOLOGY | Age: 39
Discharge: HOME | End: 2024-02-16
Payer: MEDICAID

## 2024-02-14 DIAGNOSIS — G91.9 HYDROCEPHALUS, UNSPECIFIED TYPE (HCC): ICD-10-CM

## 2024-02-14 PROCEDURE — 74018 RADEX ABDOMEN 1 VIEW: CPT

## 2024-02-19 ENCOUNTER — TELEPHONE (OUTPATIENT)
Dept: INFUSION THERAPY | Age: 39
End: 2024-02-19

## 2024-02-19 NOTE — TELEPHONE ENCOUNTER
Pt was a no call no show on 2/16/24    Called pt 2/19/24 to reschedule but mailbox is full     Electronically signed by Stacie Kolb on 2/19/2024 at 11:37 AM

## 2024-02-23 RX ORDER — ATOGEPANT 60 MG/1
1 TABLET ORAL DAILY
Qty: 30 TABLET | Refills: 0 | OUTPATIENT
Start: 2024-02-23 | End: 2024-05-23

## 2024-04-19 ENCOUNTER — TELEPHONE (OUTPATIENT)
Dept: UROLOGY | Age: 39
End: 2024-04-19

## 2024-04-19 DIAGNOSIS — N20.0 KIDNEY STONE: Primary | ICD-10-CM

## 2024-04-19 DIAGNOSIS — Z87.442 HISTORY OF KIDNEY STONES: ICD-10-CM

## 2024-04-25 ENCOUNTER — HOSPITAL ENCOUNTER (OUTPATIENT)
Age: 39
Discharge: HOME | End: 2024-04-27
Payer: MEDICAID

## 2024-04-25 ENCOUNTER — HOSPITAL ENCOUNTER (OUTPATIENT)
Dept: GENERAL RADIOLOGY | Age: 39
Discharge: HOME | End: 2024-04-27
Payer: MEDICAID

## 2024-04-25 DIAGNOSIS — N20.0 KIDNEY STONE: ICD-10-CM

## 2024-04-25 DIAGNOSIS — G91.9 HYDROCEPHALUS, UNSPECIFIED TYPE (HCC): ICD-10-CM

## 2024-04-25 DIAGNOSIS — Z87.442 HISTORY OF KIDNEY STONES: ICD-10-CM

## 2024-04-25 PROCEDURE — 72040 X-RAY EXAM NECK SPINE 2-3 VW: CPT

## 2024-04-25 PROCEDURE — 74018 RADEX ABDOMEN 1 VIEW: CPT

## 2024-04-30 ENCOUNTER — OFFICE VISIT (OUTPATIENT)
Dept: UROLOGY | Age: 39
End: 2024-04-30
Payer: MEDICAID

## 2024-04-30 VITALS
BODY MASS INDEX: 23.49 KG/M2 | WEIGHT: 183 LBS | HEIGHT: 74 IN | OXYGEN SATURATION: 96 % | SYSTOLIC BLOOD PRESSURE: 128 MMHG | TEMPERATURE: 98.2 F | DIASTOLIC BLOOD PRESSURE: 76 MMHG | HEART RATE: 82 BPM

## 2024-04-30 DIAGNOSIS — N20.0 KIDNEY STONE: Primary | ICD-10-CM

## 2024-04-30 PROCEDURE — 99214 OFFICE O/P EST MOD 30 MIN: CPT | Performed by: UROLOGY

## 2024-04-30 PROCEDURE — G8427 DOCREV CUR MEDS BY ELIG CLIN: HCPCS | Performed by: UROLOGY

## 2024-04-30 PROCEDURE — G8420 CALC BMI NORM PARAMETERS: HCPCS | Performed by: UROLOGY

## 2024-04-30 PROCEDURE — 4004F PT TOBACCO SCREEN RCVD TLK: CPT | Performed by: UROLOGY

## 2024-04-30 ASSESSMENT — ENCOUNTER SYMPTOMS
ABDOMINAL PAIN: 0
SHORTNESS OF BREATH: 0
RESPIRATORY NEGATIVE: 1
WHEEZING: 0
BACK PAIN: 0
VOMITING: 0
GASTROINTESTINAL NEGATIVE: 1
COUGH: 0
EYE PAIN: 0
EYE REDNESS: 0
NAUSEA: 0
COLOR CHANGE: 0
EYES NEGATIVE: 1
ALLERGIC/IMMUNOLOGIC NEGATIVE: 1

## 2024-05-03 ENCOUNTER — TELEPHONE (OUTPATIENT)
Dept: UROLOGY | Age: 39
End: 2024-05-03

## 2024-05-03 NOTE — TELEPHONE ENCOUNTER
Attempted to contact patient to schedule procedure. Unable to LM as VM is not set up.       Scheduling 05/31/24 @ ST 12:30pm.

## 2024-05-07 NOTE — TELEPHONE ENCOUNTER
(LT) YO @ [facility] 05/23/24 1:30 pm   **STOP BLOOD THINNERS**     PAT: 05/13/24 @ 9:30 am     Spoke with patient, procedure information sent via [employer].

## 2024-05-07 NOTE — TELEPHONE ENCOUNTER
Attempted to contact patient, phone goes directly to . Unable to LM as VM has not been set up.     Tentative date for procedure 05/23/24 @ [facility] 1:30pm

## 2024-05-08 ENCOUNTER — PATIENT MESSAGE (OUTPATIENT)
Dept: NEUROLOGY | Age: 39
End: 2024-05-08

## 2024-05-08 NOTE — TELEPHONE ENCOUNTER
From: Marcin Wills  To: Dr. Edvin Barrientos  Sent: 5/8/2024 1:45 AM EDT  Subject: Upcoming urology procedure     I have a cluster of kidney stones forming near the pump for my shunt my urologist said that he wants to send a “shockwave” through to break them down and I told him about my shunt so he suggested I reach out to you and see if that would affect it?

## 2024-05-09 ENCOUNTER — TELEPHONE (OUTPATIENT)
Dept: NEUROLOGY | Age: 39
End: 2024-05-09

## 2024-05-10 RX ORDER — NORTRIPTYLINE HYDROCHLORIDE 25 MG/1
CAPSULE ORAL
COMMUNITY
Start: 2024-05-08 | End: 2024-05-13

## 2024-05-10 RX ORDER — NAPROXEN SODIUM 550 MG/1
TABLET ORAL
COMMUNITY
Start: 2024-04-05 | End: 2024-05-13

## 2024-05-13 ENCOUNTER — TELEPHONE (OUTPATIENT)
Dept: NEUROLOGY | Age: 39
End: 2024-05-13

## 2024-05-13 ENCOUNTER — HOSPITAL ENCOUNTER (OUTPATIENT)
Dept: PREADMISSION TESTING | Age: 39
Discharge: HOME | End: 2024-05-17
Payer: MEDICAID

## 2024-05-13 VITALS
HEART RATE: 67 BPM | DIASTOLIC BLOOD PRESSURE: 72 MMHG | WEIGHT: 190 LBS | TEMPERATURE: 99 F | SYSTOLIC BLOOD PRESSURE: 106 MMHG | RESPIRATION RATE: 16 BRPM | HEIGHT: 74 IN | BODY MASS INDEX: 24.38 KG/M2 | OXYGEN SATURATION: 98 %

## 2024-05-13 LAB
BASOPHILS # BLD: 0.1 K/UL (ref 0–0.2)
BASOPHILS NFR BLD: 1 % (ref 0–2)
EOSINOPHIL # BLD: 0.8 K/UL (ref 0–0.4)
EOSINOPHILS RELATIVE PERCENT: 7 % (ref 0–4)
ERYTHROCYTE [DISTWIDTH] IN BLOOD BY AUTOMATED COUNT: 13.5 % (ref 11.5–14.9)
HCT VFR BLD AUTO: 46.5 % (ref 41–53)
HGB BLD-MCNC: 15.3 G/DL (ref 13.5–17.5)
LYMPHOCYTES NFR BLD: 2.1 K/UL (ref 1–4.8)
LYMPHOCYTES RELATIVE PERCENT: 18 % (ref 24–44)
MCH RBC QN AUTO: 32 PG (ref 26–34)
MCHC RBC AUTO-ENTMCNC: 33 G/DL (ref 31–37)
MCV RBC AUTO: 97.2 FL (ref 80–100)
MONOCYTES NFR BLD: 0.6 K/UL (ref 0.1–1.3)
MONOCYTES NFR BLD: 6 % (ref 1–7)
NEUTROPHILS NFR BLD: 68 % (ref 36–66)
NEUTS SEG NFR BLD: 7.9 K/UL (ref 1.3–9.1)
PLATELET # BLD AUTO: 238 K/UL (ref 150–450)
PMV BLD AUTO: 9.5 FL (ref 6–12)
RBC # BLD AUTO: 4.78 M/UL (ref 4.5–5.9)
WBC OTHER # BLD: 11.5 K/UL (ref 3.5–11)

## 2024-05-13 PROCEDURE — 36415 COLL VENOUS BLD VENIPUNCTURE: CPT

## 2024-05-13 PROCEDURE — 85025 COMPLETE CBC W/AUTO DIFF WBC: CPT

## 2024-05-13 PROCEDURE — APPSS45 APP SPLIT SHARED TIME 31-45 MINUTES: Performed by: NURSE PRACTITIONER

## 2024-05-13 PROCEDURE — 87086 URINE CULTURE/COLONY COUNT: CPT

## 2024-05-13 ASSESSMENT — ENCOUNTER SYMPTOMS
SINUS PAIN: 0
SHORTNESS OF BREATH: 0
EYES NEGATIVE: 1
COUGH: 0
VOMITING: 0
APNEA: 0
CONSTIPATION: 0
BACK PAIN: 1
GASTROINTESTINAL NEGATIVE: 1
TROUBLE SWALLOWING: 0
ABDOMINAL PAIN: 0
DIARRHEA: 0
NAUSEA: 0

## 2024-05-13 ASSESSMENT — PAIN DESCRIPTION - PAIN TYPE: TYPE: ACUTE PAIN

## 2024-05-13 ASSESSMENT — PAIN DESCRIPTION - ORIENTATION: ORIENTATION: LOWER

## 2024-05-13 ASSESSMENT — PAIN DESCRIPTION - DESCRIPTORS: DESCRIPTORS: ACHING

## 2024-05-13 ASSESSMENT — PAIN DESCRIPTION - LOCATION: LOCATION: BACK

## 2024-05-13 ASSESSMENT — PAIN SCALES - GENERAL: PAINLEVEL_OUTOF10: 7

## 2024-05-13 NOTE — DISCHARGE INSTRUCTIONS
Pre-op Instructions For Out-Patient Surgery    Medication Instructions:  Please stop herbs and any supplements now (includes vitamins and minerals).    Please contact your surgeon and prescribing physician for pre-op instructions for any blood thinners.    If you have inhalers/aerosol treatments at home, please use them the morning of your surgery and bring the inhalers with you to the hospital.    Please take the following medications the morning of your surgery with a sip of water:    topamax    Surgery Instructions:  After midnight before surgery:  Do not eat or drink anything, including water, mints, gum, and hard candy.  You may brush your teeth without swallowing.  No smoking, chewing tobacco, or street drugs.    Please shower or bathe before surgery.  If you were given Surgical Scrub Chlorhexidine Gluconate Liquid (CHG), please shower the night before and the morning of your surgery following the detailed instructions you received during your pre-admission visit.     Please do not wear any cologne, lotion, powder, deodorant, jewelry, piercings, perfume, makeup, nail polish, hair accessories, or hair spray on the day of surgery.  Wear loose comfortable clothing.    Leave your valuables at home but bring a payment source for any after-surgery prescriptions you plan to fill at [facility].  Bring a storage case for any glasses/contacts.    An adult who is responsible for you MUST drive you home and should be with you for the first 24 hours after surgery.     If having out-patient knee and foot surgeries, please arrange for planned crutches, walker, or wheelchair before arriving to the hospital.    The Day of Surgery:  Arrive at [facility] Surgery Entrance at the time directed by your surgeon and check in at the desk.     If you have a living will or healthcare power of , please bring a copy.    You will be taken to the pre-op holding area where you will

## 2024-05-13 NOTE — H&P
Overall Financial Resource Strain (CARDIA)     Difficulty of Paying Living Expenses: Not hard at all   Food Insecurity: No Food Insecurity (2/12/2024)    Hunger Vital Sign     Worried About Running Out of Food in the Last Year: Never true     Ran Out of Food in the Last Year: Never true   Transportation Needs: Unknown (2/12/2024)    PRAPARE - Transportation     Lack of Transportation (Non-Medical): No   Housing Stability: Unknown (2/12/2024)    Housing Stability Vital Sign     Unstable Housing in the Last Year: No       REVIEW OF SYSTEMS    No Known Allergies    Current Outpatient Medications on File Prior to Encounter   Medication Sig Dispense Refill    VITAMIN D3 25 MCG (1000 UT) TABS tablet take 1 tablet by mouth once daily 90 tablet 1    topiramate (TOPAMAX) 200 MG tablet Take 1 tablet by mouth 2 times daily       No current facility-administered medications on file prior to encounter.       Review of Systems   Constitutional:  Negative for appetite change (decreased appetite), chills and fever.   HENT:  Positive for dental problem (removable partial top  doesnt wear often) and hearing loss (right ear hearing aid). Negative for ear pain, sinus pain, sore throat and trouble swallowing.    Eyes: Negative.  Negative for visual disturbance.   Respiratory:  Negative for apnea, cough and shortness of breath.    Cardiovascular:  Negative for chest pain, palpitations and leg swelling.   Gastrointestinal: Negative.  Negative for abdominal pain, constipation, diarrhea, nausea and vomiting.   Genitourinary:  Negative for dysuria and hematuria.        See HPI   Musculoskeletal:  Positive for back pain. Negative for arthralgias and neck pain.   Skin: Negative.    Neurological:  Positive for headaches. Negative for dizziness, seizures, speech difficulty, weakness and numbness.   Hematological:  Does not bruise/bleed easily.   Psychiatric/Behavioral: Negative.         GENERAL PHYSICAL EXAM     Vitals: /72   Pulse 67

## 2024-05-14 ENCOUNTER — TELEPHONE (OUTPATIENT)
Dept: NEUROLOGY | Age: 39
End: 2024-05-14

## 2024-05-14 LAB
MICROORGANISM SPEC CULT: NO GROWTH
SPECIMEN DESCRIPTION: NORMAL

## 2024-05-29 ENCOUNTER — OFFICE VISIT (OUTPATIENT)
Dept: NEUROLOGY | Age: 39
End: 2024-05-29
Payer: MEDICAID

## 2024-05-29 VITALS
HEART RATE: 69 BPM | DIASTOLIC BLOOD PRESSURE: 71 MMHG | SYSTOLIC BLOOD PRESSURE: 108 MMHG | BODY MASS INDEX: 23.44 KG/M2 | WEIGHT: 182.6 LBS | HEIGHT: 74 IN

## 2024-05-29 DIAGNOSIS — G43.711 CHRONIC MIGRAINE WITHOUT AURA, WITH INTRACTABLE MIGRAINE, SO STATED, WITH STATUS MIGRAINOSUS: Primary | ICD-10-CM

## 2024-05-29 PROCEDURE — G8420 CALC BMI NORM PARAMETERS: HCPCS | Performed by: PSYCHIATRY & NEUROLOGY

## 2024-05-29 PROCEDURE — G8427 DOCREV CUR MEDS BY ELIG CLIN: HCPCS | Performed by: PSYCHIATRY & NEUROLOGY

## 2024-05-29 PROCEDURE — 4004F PT TOBACCO SCREEN RCVD TLK: CPT | Performed by: PSYCHIATRY & NEUROLOGY

## 2024-05-29 PROCEDURE — 99214 OFFICE O/P EST MOD 30 MIN: CPT | Performed by: PSYCHIATRY & NEUROLOGY

## 2024-05-29 NOTE — PROGRESS NOTES
[facility]  [address], [address]  [place] [address]  Ph: [phone] or [phone]  FAX: [phone]    Chief Complaint: Headaches     Dear Chanel Quintero APRN - CNP     I had the pleasure of seeing your patient today in neurology consultation for his symptoms. As you would recall Marcin Wills is a 39 y.o. male.  Patient has previously been seen by Tami Hay CNP for treatment of chronic migraine and intermittent diplopia. The patient has intermittent diplopia secondary to hydrocephalus syndrome which is chronic. The patient developed a mastoid infection at the age of 15. In 2001, he had his first ventriculoperitoneal shunt. In the years following, he had four subsequent shunt revisions. The patient has had multiple MRIs over the years, showing stability of his ventricles and shunt placement.    Patient has been maintained on Topamax 150 mg BID as well as qulipta 60 mg daily for headache prophylaxis. He was last seen 3/14/23, and presents 7/20/23 for continuing neurological care. Patient had a recent emergency visit 3/25/23 where he broke his right clavicle. He reports that he continues to experience daily headaches, and they occur roughly 15 days a month. He experiences 8-12 headache-free days in a month. Patient reports that he avoids abortive medications, however will occasionally take tylenol if his migraines become unbearable. He additionally finds relief laying down. Patient has previously tried Botox injections as well as Aimovig, however did not receive these well due to pain. We discussed several options going forward, including Vyepti infusions every three months.     On 11/21/2023 the patient reports that Vyepti infusions has now improved his breakthrough migraine headaches. Patient notes he found relief of his headaches after his prednisone was tapered down. He notes no visual disturbances or memory loss.    On 5/29/2024, the patient presents to the

## 2024-05-30 ENCOUNTER — TELEPHONE (OUTPATIENT)
Dept: NEUROLOGY | Age: 39
End: 2024-05-30

## 2024-05-30 NOTE — TELEPHONE ENCOUNTER
----- Message from Edvin Barrientos MD sent at 5/30/2024  1:48 PM EDT -----  Can we please contact Dr Pak's office. Patient is on toiramate and has renal stones. Question if topiramate need be tapered off or adjusted. Thanks

## 2024-05-31 NOTE — TELEPHONE ENCOUNTER
I called and spoke with Mari at Dr. Pak' office. She said Dr. Pak is not in today but she will send a message and she said she can also ask Zhao BENJAMIN in their office.

## 2024-06-03 NOTE — TELEPHONE ENCOUNTER
Received a call from Dr. Pak's office stating that the patient needs to be seen. They are going to contact him to schedule and appointment.

## 2024-06-04 ENCOUNTER — TELEPHONE (OUTPATIENT)
Dept: UROLOGY | Age: 39
End: 2024-06-04

## 2024-06-04 DIAGNOSIS — N20.0 KIDNEY STONE: Primary | ICD-10-CM

## 2024-06-04 NOTE — TELEPHONE ENCOUNTER
Attempted to contact patient for procedure re-scheduling. Unable to leave voicemail for patient, will call back at later time.

## 2024-07-03 ENCOUNTER — HOSPITAL ENCOUNTER (EMERGENCY)
Age: 39
Discharge: HOME | End: 2024-07-03
Attending: STUDENT IN AN ORGANIZED HEALTH CARE EDUCATION/TRAINING PROGRAM
Payer: MEDICAID

## 2024-07-03 ENCOUNTER — APPOINTMENT (OUTPATIENT)
Dept: GENERAL RADIOLOGY | Age: 39
End: 2024-07-03
Payer: MEDICAID

## 2024-07-03 ENCOUNTER — APPOINTMENT (OUTPATIENT)
Dept: CT IMAGING | Age: 39
End: 2024-07-03
Payer: MEDICAID

## 2024-07-03 VITALS
OXYGEN SATURATION: 97 % | WEIGHT: 190 LBS | HEIGHT: 72 IN | HEART RATE: 61 BPM | DIASTOLIC BLOOD PRESSURE: 64 MMHG | TEMPERATURE: 97.8 F | SYSTOLIC BLOOD PRESSURE: 97 MMHG | RESPIRATION RATE: 16 BRPM | BODY MASS INDEX: 25.73 KG/M2

## 2024-07-03 DIAGNOSIS — R51.9 NONINTRACTABLE HEADACHE, UNSPECIFIED CHRONICITY PATTERN, UNSPECIFIED HEADACHE TYPE: Primary | ICD-10-CM

## 2024-07-03 LAB
ANION GAP SERPL CALCULATED.3IONS-SCNC: 15 MMOL/L (ref 9–17)
BASOPHILS # BLD: 0.1 K/UL (ref 0–0.2)
BASOPHILS NFR BLD: 1 % (ref 0–2)
BUN SERPL-MCNC: 19 MG/DL (ref 6–20)
CALCIUM SERPL-MCNC: 9.4 MG/DL (ref 8.6–10.4)
CHLORIDE SERPL-SCNC: 105 MMOL/L (ref 98–107)
CO2 SERPL-SCNC: 21 MMOL/L (ref 20–31)
CREAT SERPL-MCNC: 1.1 MG/DL (ref 0.7–1.2)
EOSINOPHIL # BLD: 0.9 K/UL (ref 0–0.4)
EOSINOPHILS RELATIVE PERCENT: 7 % (ref 0–4)
ERYTHROCYTE [DISTWIDTH] IN BLOOD BY AUTOMATED COUNT: 13.6 % (ref 11.5–14.9)
GFR, ESTIMATED: 87 ML/MIN/1.73M2
GLUCOSE SERPL-MCNC: 171 MG/DL (ref 70–99)
HCT VFR BLD AUTO: 42 % (ref 41–53)
HGB BLD-MCNC: 14.3 G/DL (ref 13.5–17.5)
LYMPHOCYTES NFR BLD: 2.6 K/UL (ref 1–4.8)
LYMPHOCYTES RELATIVE PERCENT: 20 % (ref 24–44)
MAGNESIUM SERPL-MCNC: 1.9 MG/DL (ref 1.6–2.6)
MCH RBC QN AUTO: 32.5 PG (ref 26–34)
MCHC RBC AUTO-ENTMCNC: 34.2 G/DL (ref 31–37)
MCV RBC AUTO: 95.2 FL (ref 80–100)
MONOCYTES NFR BLD: 0.8 K/UL (ref 0.1–1.3)
MONOCYTES NFR BLD: 6 % (ref 1–7)
NEUTROPHILS NFR BLD: 66 % (ref 36–66)
NEUTS SEG NFR BLD: 8.4 K/UL (ref 1.3–9.1)
PLATELET # BLD AUTO: 200 K/UL (ref 150–450)
PMV BLD AUTO: 9.4 FL (ref 6–12)
POTASSIUM SERPL-SCNC: 3.9 MMOL/L (ref 3.7–5.3)
RBC # BLD AUTO: 4.41 M/UL (ref 4.5–5.9)
SODIUM SERPL-SCNC: 141 MMOL/L (ref 135–144)
WBC OTHER # BLD: 12.8 K/UL (ref 3.5–11)

## 2024-07-03 PROCEDURE — 2580000003 HC RX 258: Performed by: STUDENT IN AN ORGANIZED HEALTH CARE EDUCATION/TRAINING PROGRAM

## 2024-07-03 PROCEDURE — 36415 COLL VENOUS BLD VENIPUNCTURE: CPT

## 2024-07-03 PROCEDURE — 6360000002 HC RX W HCPCS: Performed by: STUDENT IN AN ORGANIZED HEALTH CARE EDUCATION/TRAINING PROGRAM

## 2024-07-03 PROCEDURE — 70250 X-RAY EXAM OF SKULL: CPT

## 2024-07-03 PROCEDURE — 85025 COMPLETE CBC W/AUTO DIFF WBC: CPT

## 2024-07-03 PROCEDURE — 96374 THER/PROPH/DIAG INJ IV PUSH: CPT

## 2024-07-03 PROCEDURE — 96375 TX/PRO/DX INJ NEW DRUG ADDON: CPT

## 2024-07-03 PROCEDURE — 83735 ASSAY OF MAGNESIUM: CPT

## 2024-07-03 PROCEDURE — 70450 CT HEAD/BRAIN W/O DYE: CPT

## 2024-07-03 PROCEDURE — 80048 BASIC METABOLIC PNL TOTAL CA: CPT

## 2024-07-03 PROCEDURE — 99284 EMERGENCY DEPT VISIT MOD MDM: CPT

## 2024-07-03 RX ORDER — DIPHENHYDRAMINE HYDROCHLORIDE 50 MG/ML
25 INJECTION INTRAMUSCULAR; INTRAVENOUS ONCE
Status: COMPLETED | OUTPATIENT
Start: 2024-07-03 | End: 2024-07-03

## 2024-07-03 RX ORDER — PROCHLORPERAZINE EDISYLATE 5 MG/ML
10 INJECTION INTRAMUSCULAR; INTRAVENOUS ONCE
Status: COMPLETED | OUTPATIENT
Start: 2024-07-03 | End: 2024-07-03

## 2024-07-03 RX ORDER — 0.9 % SODIUM CHLORIDE 0.9 %
1000 INTRAVENOUS SOLUTION INTRAVENOUS ONCE
Status: COMPLETED | OUTPATIENT
Start: 2024-07-03 | End: 2024-07-03

## 2024-07-03 RX ORDER — DEXAMETHASONE SODIUM PHOSPHATE 10 MG/ML
10 INJECTION, SOLUTION INTRAMUSCULAR; INTRAVENOUS ONCE
Status: COMPLETED | OUTPATIENT
Start: 2024-07-03 | End: 2024-07-03

## 2024-07-03 RX ORDER — KETOROLAC TROMETHAMINE 30 MG/ML
15 INJECTION, SOLUTION INTRAMUSCULAR; INTRAVENOUS ONCE
Status: COMPLETED | OUTPATIENT
Start: 2024-07-03 | End: 2024-07-03

## 2024-07-03 RX ADMIN — KETOROLAC TROMETHAMINE 15 MG: 30 INJECTION, SOLUTION INTRAMUSCULAR at 03:20

## 2024-07-03 RX ADMIN — PROCHLORPERAZINE EDISYLATE 10 MG: 5 INJECTION INTRAMUSCULAR; INTRAVENOUS at 02:21

## 2024-07-03 RX ADMIN — DEXAMETHASONE SODIUM PHOSPHATE 10 MG: 10 INJECTION, SOLUTION INTRAMUSCULAR; INTRAVENOUS at 02:19

## 2024-07-03 RX ADMIN — SODIUM CHLORIDE 1000 ML: 9 INJECTION, SOLUTION INTRAVENOUS at 02:16

## 2024-07-03 RX ADMIN — DIPHENHYDRAMINE HYDROCHLORIDE 25 MG: 50 INJECTION INTRAMUSCULAR; INTRAVENOUS at 02:18

## 2024-07-03 ASSESSMENT — ENCOUNTER SYMPTOMS
EYE REDNESS: 0
DIARRHEA: 0
SHORTNESS OF BREATH: 0
CHEST TIGHTNESS: 0
RHINORRHEA: 0
SORE THROAT: 0
NAUSEA: 0
ABDOMINAL PAIN: 0
VOMITING: 0
EYE DISCHARGE: 0

## 2024-07-03 ASSESSMENT — PAIN DESCRIPTION - LOCATION
LOCATION: HEAD
LOCATION: HEAD

## 2024-07-03 ASSESSMENT — PAIN SCALES - GENERAL
PAINLEVEL_OUTOF10: 7
PAINLEVEL_OUTOF10: 5

## 2024-07-03 ASSESSMENT — PAIN - FUNCTIONAL ASSESSMENT: PAIN_FUNCTIONAL_ASSESSMENT: 0-10

## 2024-07-03 NOTE — ED PROVIDER NOTES
R51.9, G93.9    Chronic brain-hydrocephalus syndrome (HCC) G91.8    S/P ventriculoperitoneal shunt Z98.2    Ex-smoker Z87.891    Obstructed  shunt (HCC) T85.09XA    Plantar wart of left foot B07.0    Chronic fatigue R53.82    Hammer toe, acquired b/l M20.40    Anemia D64.9    Blood alkaline phosphatase increased compared with prior measurement R74.8    Chronic tension-type headache, intractable G44.221    Abscess of oral tissue K12.2    Intractable chronic migraine without aura and with status migrainosus G43.711    Hearing loss H91.90    Impacted cerumen H61.20    Perforation of tympanic membrane H72.90     SURGICAL HISTORY       Past Surgical History:   Procedure Laterality Date    CHOLECYSTECTOMY, LAPAROSCOPIC  01/01/2001    CLAVICLE SURGERY Right 03/21/2023    RIGHT DISTAL CLAVICLE OPEN REDUCTION INTERNAL FIXATION WITH ARTHREX (Right)    CLAVICLE SURGERY Right 03/21/2023    RIGHT DISTAL CLAVICLE OPEN REDUCTION INTERNAL FIXATION WITH ARTHREX performed by Linda Still MD at [facility] OR    MASTOIDECTOMY Right 01/01/2001    VENTRICULOPERITONEAL SHUNT  2001    original shunt and has had multiple revisions of shunts    VENTRICULOPERITONEAL SHUNT  06/23/2014    REVISION    WISDOM TOOTH EXTRACTION       CURRENT MEDICATIONS       Previous Medications    TOPIRAMATE (TOPAMAX) 200 MG TABLET    Take 1 tablet by mouth 2 times daily    VITAMIN D3 25 MCG (1000 UT) TABS TABLET    take 1 tablet by mouth once daily     ALLERGIES     has No Known Allergies.  FAMILY HISTORY     He indicated that the status of his mother is unknown. He indicated that the status of his father is unknown. He indicated that the status of his maternal grandmother is unknown. He indicated that the status of his maternal grandfather is unknown.     SOCIAL HISTORY       Social History     Tobacco Use    Smoking status: Light Smoker     Current packs/day: 0.00     Average packs/day: 0.5 packs/day for 24.0 years (12.0 ttl pk-yrs)     Types:

## 2024-07-03 NOTE — ED NOTES
Mode of arrival (squad #, walk in, police, etc) : walk in        Chief complaint(s): H/A        Arrival Note (brief scenario, treatment PTA, etc).: Pt c/o persistent H/A, Hx migraines and hydrocephalus with shunt. States pain increased x2 wks. Took Tylenol yesterday with little relief. Rates at 7/10. Does also admit to some light sensitivity, chronic. Denies any new injury/trauma. VVS in triage.         C= \"Have you ever felt that you should Cut down on your drinking?\"  No  A= \"Have people Annoyed you by criticizing your drinking?\"  No  G= \"Have you ever felt bad or Guilty about your drinking?\"  No  E= \"Have you ever had a drink as an Eye-opener first thing in the morning to steady your nerves or to help a hangover?\"  No      Deferred []      Reason for deferring: N/A    *If yes to two or more: probable alcohol abuse.*

## 2024-07-03 NOTE — ED NOTES
D/c instructions reviewed with patient. Encouraged f/u with established Neurologist. No new medication prescriptions with current visit. Ambulatory with independent, steady gait and all personal belongings.

## 2024-07-10 NOTE — TELEPHONE ENCOUNTER
He had a cluster of stones in left kidney measuring up to 1.4cm. He could have passed one of the small stones, but it's likely that he still has cluster of stones in the left kidney. If he doesn't want to proceed with stone surgery at this time, that is ok- but would repeat KUB in about 2-3 mos and reconsider stone treatment. If he is agreeable to proceeding, repeat KUB. Let me know what he decides.

## 2024-07-10 NOTE — TELEPHONE ENCOUNTER
Contacted patient to proceed with scheduling procedure. Patient states that he believes that he might have passed the stone. Patient states that one day last week, when he went to the restroom he had some pain and noticed blood then urinated and thought he might have heard something hit the toilet. Patient informed that a message will be sent and office will follow up. Patient verbalizes understanding and call was ended.

## 2024-07-11 NOTE — TELEPHONE ENCOUNTER
Contacted patient, LM for patient to contact office to follow up in regards to message from CNP, [place] and to discuss follow up.

## 2024-07-23 NOTE — TELEPHONE ENCOUNTER
Contacted patient to follow up in regards to procedure. Patient was informed of message from CNP, [place]. Patient states that he would like to follow up in a couple months with the KUB as he feels that he has passed the stones. Patient scheduled for 09/26/24 @ 11:30am. Patient informed to have KUB done 1 week prior.

## 2024-09-23 ENCOUNTER — TELEPHONE (OUTPATIENT)
Dept: UROLOGY | Age: 39
End: 2024-09-23

## 2024-09-25 ENCOUNTER — TELEPHONE (OUTPATIENT)
Dept: UROLOGY | Age: 39
End: 2024-09-25

## 2024-09-26 ENCOUNTER — HOSPITAL ENCOUNTER (OUTPATIENT)
Age: 39
Discharge: HOME | End: 2024-09-28
Payer: MEDICAID

## 2024-09-26 ENCOUNTER — HOSPITAL ENCOUNTER (OUTPATIENT)
Dept: GENERAL RADIOLOGY | Age: 39
Discharge: HOME | End: 2024-09-28
Attending: UROLOGY
Payer: MEDICAID

## 2024-09-26 DIAGNOSIS — N20.0 KIDNEY STONE: ICD-10-CM

## 2024-09-26 PROCEDURE — 74018 RADEX ABDOMEN 1 VIEW: CPT

## 2024-10-10 ENCOUNTER — HOSPITAL ENCOUNTER (EMERGENCY)
Age: 39
Discharge: LEFT BEFORE DISCHARGE | End: 2024-10-10
Attending: EMERGENCY MEDICINE

## 2024-10-10 ENCOUNTER — OFFICE VISIT (OUTPATIENT)
Dept: UROLOGY | Age: 39
End: 2024-10-10
Payer: MEDICAID

## 2024-10-10 VITALS
SYSTOLIC BLOOD PRESSURE: 120 MMHG | HEIGHT: 70 IN | WEIGHT: 175 LBS | OXYGEN SATURATION: 98 % | BODY MASS INDEX: 25.05 KG/M2 | DIASTOLIC BLOOD PRESSURE: 80 MMHG | TEMPERATURE: 98 F | HEART RATE: 68 BPM

## 2024-10-10 VITALS
DIASTOLIC BLOOD PRESSURE: 70 MMHG | BODY MASS INDEX: 23.1 KG/M2 | HEIGHT: 74 IN | OXYGEN SATURATION: 98 % | HEART RATE: 61 BPM | TEMPERATURE: 97.5 F | RESPIRATION RATE: 18 BRPM | WEIGHT: 180 LBS | SYSTOLIC BLOOD PRESSURE: 115 MMHG

## 2024-10-10 DIAGNOSIS — Z53.21 PATIENT LEFT WITHOUT BEING SEEN: Primary | ICD-10-CM

## 2024-10-10 DIAGNOSIS — N20.0 KIDNEY STONE: Primary | ICD-10-CM

## 2024-10-10 PROCEDURE — G8427 DOCREV CUR MEDS BY ELIG CLIN: HCPCS | Performed by: UROLOGY

## 2024-10-10 PROCEDURE — G8417 CALC BMI ABV UP PARAM F/U: HCPCS | Performed by: UROLOGY

## 2024-10-10 PROCEDURE — 99213 OFFICE O/P EST LOW 20 MIN: CPT | Performed by: UROLOGY

## 2024-10-10 PROCEDURE — 4004F PT TOBACCO SCREEN RCVD TLK: CPT | Performed by: UROLOGY

## 2024-10-10 PROCEDURE — G8484 FLU IMMUNIZE NO ADMIN: HCPCS | Performed by: UROLOGY

## 2024-10-10 ASSESSMENT — ENCOUNTER SYMPTOMS
COLOR CHANGE: 0
EYE REDNESS: 0
WHEEZING: 0
NAUSEA: 0
GASTROINTESTINAL NEGATIVE: 1
EYE PAIN: 0
ALLERGIC/IMMUNOLOGIC NEGATIVE: 1
BACK PAIN: 0
RESPIRATORY NEGATIVE: 1
EYES NEGATIVE: 1
COUGH: 0
ABDOMINAL PAIN: 0
SHORTNESS OF BREATH: 0
VOMITING: 0

## 2024-10-10 ASSESSMENT — PAIN SCALES - GENERAL: PAINLEVEL_OUTOF10: 7

## 2024-10-10 ASSESSMENT — PAIN - FUNCTIONAL ASSESSMENT: PAIN_FUNCTIONAL_ASSESSMENT: 0-10

## 2024-10-10 NOTE — ED PROVIDER NOTES
I did not see this patient.  He was not in the room.  LWBS after RN triage.       Balbina Aquino PA-C  10/10/24 4040    
Patient left without being seen, I did not see or evaluate the patient, patient was not seen or evaluated by midlevel provider     Navi Pettit,   10/10/24 5524    
O positive

## 2024-10-10 NOTE — PROGRESS NOTES
Review of Systems   Constitutional: Negative.  Negative for appetite change, chills and fever.   HENT: Negative.     Eyes: Negative.  Negative for pain, redness and visual disturbance.   Respiratory: Negative.  Negative for cough, shortness of breath and wheezing.    Cardiovascular: Negative.  Negative for chest pain and leg swelling.   Gastrointestinal: Negative.  Negative for abdominal pain, nausea and vomiting.   Endocrine: Negative.    Genitourinary: Negative.  Negative for difficulty urinating, dysuria, flank pain, frequency, hematuria, testicular pain and urgency.   Musculoskeletal: Negative.  Negative for back pain, joint swelling and myalgias.   Skin: Negative.  Negative for color change, rash and wound.   Allergic/Immunologic: Negative.    Neurological: Negative.  Negative for dizziness, tremors, weakness, numbness and headaches.   Hematological: Negative.  Negative for adenopathy. Does not bruise/bleed easily.   Psychiatric/Behavioral: Negative.       
kg/m².  Patient is a 39 y.o. male in no acute distress and alert and oriented to person, place and time.  Physical Exam  Constitutional: Patient in no acute distress.  Neuro: Alert and oriented to person, place and time.  Psych: Mood normal, affect normal  Skin: No rash noted  HEENT: Head: Normocephalic andatraumatic  Conjunctivae and EOM are normal. Pupils are equal, round  Nose:Normal  Right External Ear: Normal; Left External Ear: Normal  Mouth: Mucosa Moist  Neck: Supple  Lungs: Respiratory effort is normal  Cardiovascular: Warm & Pink  Abdomen: Soft, non-tender, non-distended with no CVA,  No flank tenderness,  Or     Assessment and Plan      1. Kidney stone           Plan:    Assessment & Plan   Return if symptoms worsen or fail to improve.    Prescriptions Ordered:  No orders of the defined types were placed in this encounter.    Orders Placed:  No orders of the defined types were placed in this encounter.          Wesley Matthews MD    Agree with the ROS entered by the MA.

## 2025-02-12 PROBLEM — Z90.89 H/O MASTOIDECTOMY: Status: ACTIVE | Noted: 2025-02-12

## 2025-02-17 ENCOUNTER — HOSPITAL ENCOUNTER (OUTPATIENT)
Age: 40
Discharge: HOME | End: 2025-02-17
Payer: MEDICAID

## 2025-02-17 DIAGNOSIS — Z00.00 ROUTINE ADULT HEALTH MAINTENANCE: ICD-10-CM

## 2025-02-17 DIAGNOSIS — E55.9 VITAMIN D DEFICIENCY: ICD-10-CM

## 2025-02-17 DIAGNOSIS — Z13.220 ENCOUNTER FOR SCREENING FOR LIPID DISORDER: ICD-10-CM

## 2025-02-17 LAB
25(OH)D3 SERPL-MCNC: 42.7 NG/ML (ref 30–100)
ALBUMIN SERPL-MCNC: 4.6 G/DL (ref 3.5–5.2)
ALP SERPL-CCNC: 107 U/L (ref 40–129)
ALT SERPL-CCNC: 9 U/L (ref 10–50)
ANION GAP SERPL CALCULATED.3IONS-SCNC: 11 MMOL/L (ref 9–16)
AST SERPL-CCNC: 20 U/L (ref 10–50)
BASOPHILS # BLD: 0.1 K/UL (ref 0–0.2)
BASOPHILS NFR BLD: 1 % (ref 0–2)
BILIRUB SERPL-MCNC: 0.3 MG/DL (ref 0–1.2)
BUN SERPL-MCNC: 9 MG/DL (ref 6–20)
CALCIUM SERPL-MCNC: 9.4 MG/DL (ref 8.6–10.4)
CHLORIDE SERPL-SCNC: 108 MMOL/L (ref 98–107)
CHOLEST SERPL-MCNC: 187 MG/DL (ref 0–199)
CHOLESTEROL/HDL RATIO: 3.6
CO2 SERPL-SCNC: 25 MMOL/L (ref 20–31)
CREAT SERPL-MCNC: 0.8 MG/DL (ref 0.7–1.2)
EOSINOPHIL # BLD: 0.5 K/UL (ref 0–0.4)
EOSINOPHILS RELATIVE PERCENT: 5 % (ref 0–4)
ERYTHROCYTE [DISTWIDTH] IN BLOOD BY AUTOMATED COUNT: 13.6 % (ref 11.5–14.9)
GFR, ESTIMATED: >90 ML/MIN/1.73M2
GLUCOSE SERPL-MCNC: 95 MG/DL (ref 74–99)
HCT VFR BLD AUTO: 45.2 % (ref 41–53)
HDLC SERPL-MCNC: 52 MG/DL
HGB BLD-MCNC: 15.1 G/DL (ref 13.5–17.5)
LDLC SERPL CALC-MCNC: 118 MG/DL (ref 0–100)
LYMPHOCYTES NFR BLD: 2.3 K/UL (ref 1–4.8)
LYMPHOCYTES RELATIVE PERCENT: 22 % (ref 24–44)
MCH RBC QN AUTO: 33.2 PG (ref 26–34)
MCHC RBC AUTO-ENTMCNC: 33.5 G/DL (ref 31–37)
MCV RBC AUTO: 99.1 FL (ref 80–100)
MONOCYTES NFR BLD: 0.7 K/UL (ref 0.1–1.3)
MONOCYTES NFR BLD: 6 % (ref 1–7)
NEUTROPHILS NFR BLD: 66 % (ref 36–66)
NEUTS SEG NFR BLD: 7.1 K/UL (ref 1.3–9.1)
PLATELET # BLD AUTO: 244 K/UL (ref 150–450)
PMV BLD AUTO: 9.1 FL (ref 6–12)
POTASSIUM SERPL-SCNC: 4.2 MMOL/L (ref 3.7–5.3)
PROT SERPL-MCNC: 6.7 G/DL (ref 6.6–8.7)
RBC # BLD AUTO: 4.56 M/UL (ref 4.5–5.9)
SODIUM SERPL-SCNC: 144 MMOL/L (ref 136–145)
TRIGL SERPL-MCNC: 85 MG/DL (ref 0–149)
WBC OTHER # BLD: 10.7 K/UL (ref 3.5–11)

## 2025-02-17 PROCEDURE — 36415 COLL VENOUS BLD VENIPUNCTURE: CPT

## 2025-02-17 PROCEDURE — 80061 LIPID PANEL: CPT

## 2025-02-17 PROCEDURE — 82306 VITAMIN D 25 HYDROXY: CPT

## 2025-02-17 PROCEDURE — 85025 COMPLETE CBC W/AUTO DIFF WBC: CPT

## 2025-02-17 PROCEDURE — 80053 COMPREHEN METABOLIC PANEL: CPT

## (undated) DEVICE — 3M™ IOBAN™ 2 ANTIMICROBIAL INCISE DRAPE 6650EZ: Brand: IOBAN™ 2

## (undated) DEVICE — GLOVE ORANGE PI 7 1/2   MSG9075

## (undated) DEVICE — BIT DRL DIA2.5MM FOR ANK FRAC MGMT SYS

## (undated) DEVICE — INTENDED FOR TISSUE SEPARATION, AND OTHER PROCEDURES THAT REQUIRE A SHARP SURGICAL BLADE TO PUNCTURE OR CUT.: Brand: BARD-PARKER ® CARBON RIB-BACK BLADES

## (undated) DEVICE — ANCHOR FIX DISP FOR ANK FRAC SYS BB-TAK

## (undated) DEVICE — BLANKET WRM W40.2XL55.9IN IORT LO BODY + MISTRAL AIR

## (undated) DEVICE — SYRINGE IRRIG 60ML SFT PLIABLE BLB EZ TO GRP 1 HND USE W/

## (undated) DEVICE — BIT DRL DIA2MM CALIB FOR ANK FRAC MGMT SYS

## (undated) DEVICE — STOCKINETTE ORTH W6XL48IN OFF WHT SGL PLY UNBLEACHED COT RIB

## (undated) DEVICE — YANKAUER,SMOOTH HANDLE,HIGH CAPACITY: Brand: MEDLINE INDUSTRIES, INC.

## (undated) DEVICE — POUCH INSTR W6.75XL11.5IN FRST 2 PKT ADH FOR ORTH AND

## (undated) DEVICE — SHEET, ORTHO, SPLIT, STERILE: Brand: MEDLINE

## (undated) DEVICE — SUTURE TIGERTAPE CERCLAGE W/O NEEDLE

## (undated) DEVICE — SUTURE VCRL SZ 0 L36IN ABSRB UD L36MM CT-1 1/2 CIR J946H

## (undated) DEVICE — STRAP,POSITIONING,KNEE/BODY,FOAM,4X60": Brand: MEDLINE

## (undated) DEVICE — TENSIONER SUTURE

## (undated) DEVICE — DRESSING TRNSPAR W5XL4.5IN FLM SHT SEMIPERMEABLE WIND

## (undated) DEVICE — COVER,TABLE,HEAVY DUTY,50"X90",STRL: Brand: MEDLINE

## (undated) DEVICE — 1010 S-DRAPE TOWEL DRAPE 10/BX: Brand: STERI-DRAPE™

## (undated) DEVICE — MHPB ORTHO GENERAL PACK: Brand: MEDLINE INDUSTRIES, INC.

## (undated) DEVICE — GOWN,SIRUS,NONRNF,SETINSLV,XL,20/CS: Brand: MEDLINE

## (undated) DEVICE — SUTURE PROL SZ 3-0 L18IN NONABSORBABLE BLU L24MM FS-1 3/8 8684G

## (undated) DEVICE — BIT DRL DIA2.5MM LNG GRAD FOR ANK FRAC MGMT SYS

## (undated) DEVICE — GLOVE ORANGE PI 7   MSG9070

## (undated) DEVICE — COVER,C-ARM,41X74: Brand: MEDLINE

## (undated) DEVICE — SUTURE VCRL + SZ 3-0 L36IN ABSRB UD L36MM CT-1 1/2 CIR VCP944H

## (undated) DEVICE — SUTURE VCRL + SZ 1 L18IN ABSRB VLT L36MM CT-1 1/2 CIR VCP741D

## (undated) DEVICE — STRIP,CLOSURE,WOUND,MEDI-STRIP,1/2X4: Brand: MEDLINE

## (undated) DEVICE — BANDAGE,SELF ADHRNT,COFLEX,4"X5YD,STRL: Brand: COLABEL

## (undated) DEVICE — SUTURE VCRL + SZ 3-0 L27IN ABSRB UD L26MM SH 1/2 CIR VCP416H

## (undated) DEVICE — Device

## (undated) DEVICE — ELECTRODE PT RET AD L9FT HI MOIST COND ADH HYDRGEL CORDED

## (undated) DEVICE — COVER,MAYO STAND,XL,STERILE: Brand: MEDLINE

## (undated) DEVICE — MARKER,SKIN,WI/RULER AND LABELS: Brand: MEDLINE